# Patient Record
Sex: FEMALE | Race: WHITE | Employment: OTHER
[De-identification: names, ages, dates, MRNs, and addresses within clinical notes are randomized per-mention and may not be internally consistent; named-entity substitution may affect disease eponyms.]

---

## 2017-01-05 ENCOUNTER — PRIOR ORIGINAL RECORDS (OUTPATIENT)
Dept: OTHER | Age: 82
End: 2017-01-05

## 2017-04-07 ENCOUNTER — OFFICE VISIT (OUTPATIENT)
Dept: HEMATOLOGY/ONCOLOGY | Facility: HOSPITAL | Age: 82
End: 2017-04-07
Attending: INTERNAL MEDICINE
Payer: MEDICARE

## 2017-04-07 VITALS
HEART RATE: 76 BPM | HEIGHT: 70 IN | RESPIRATION RATE: 18 BRPM | SYSTOLIC BLOOD PRESSURE: 134 MMHG | DIASTOLIC BLOOD PRESSURE: 71 MMHG | TEMPERATURE: 99 F | OXYGEN SATURATION: 100 %

## 2017-04-07 DIAGNOSIS — N18.9 ANEMIA OF RENAL DISEASE: Primary | ICD-10-CM

## 2017-04-07 DIAGNOSIS — D50.9 IRON DEFICIENCY ANEMIA: ICD-10-CM

## 2017-04-07 DIAGNOSIS — D64.9 NORMOCYTIC NORMOCHROMIC ANEMIA: ICD-10-CM

## 2017-04-07 DIAGNOSIS — D63.1 ANEMIA OF RENAL DISEASE: Primary | ICD-10-CM

## 2017-04-07 PROCEDURE — 99213 OFFICE O/P EST LOW 20 MIN: CPT | Performed by: INTERNAL MEDICINE

## 2017-04-07 RX ORDER — MELATONIN
325
COMMUNITY

## 2017-04-07 NOTE — PROGRESS NOTES
[facility] Progress Note    Patient Name: Keyla Whalen   YOB: 1934   Medical Record Number: [id number]   CSN: [id number]   Attending Physician: Sharlee Lesches, M.D.    Referring Physician: Bebe Rodriguez MD      Date of Visit: 4/7 nightly., Disp: , Rfl:     Allergies:  No Known Allergies     Review of Systems:  A 14-point ROS was done with pertinent positives and negative per the HPI    Vital Signs:  /71 mmHg  Pulse 76  Temp(Src) 98.6 °F (37 °C) (Tympanic)  Resp 18  Ht 1.778 m pending those results will decide if she needs to continue on this. Will recheck labs in 6 months. Emotional Well Being:  I have assessed the patient's emotional well-being and any concerns about anxiety or depression.   We discussed issues of distress

## 2017-04-12 ENCOUNTER — PRIOR ORIGINAL RECORDS (OUTPATIENT)
Dept: OTHER | Age: 82
End: 2017-04-12

## 2017-07-06 ENCOUNTER — PRIOR ORIGINAL RECORDS (OUTPATIENT)
Dept: OTHER | Age: 82
End: 2017-07-06

## 2017-07-14 ENCOUNTER — HOSPITAL ENCOUNTER (OUTPATIENT)
Dept: CV DIAGNOSTICS | Facility: HOSPITAL | Age: 82
Discharge: HOME | End: 2017-07-14
Attending: INTERNAL MEDICINE
Payer: MEDICARE

## 2017-07-14 DIAGNOSIS — I48.0 AF (PAROXYSMAL ATRIAL FIBRILLATION) (HCC): ICD-10-CM

## 2017-07-14 DIAGNOSIS — R53.83 FATIGUE: ICD-10-CM

## 2017-07-14 PROCEDURE — 93017 CV STRESS TEST TRACING ONLY: CPT | Performed by: INTERNAL MEDICINE

## 2017-07-14 PROCEDURE — 78452 HT MUSCLE IMAGE SPECT MULT: CPT | Performed by: INTERNAL MEDICINE

## 2017-07-14 PROCEDURE — 93018 CV STRESS TEST I&R ONLY: CPT | Performed by: INTERNAL MEDICINE

## 2017-08-23 ENCOUNTER — TELEPHONE (OUTPATIENT)
Dept: FAMILY MEDICINE CLINIC | Facility: CLINIC | Age: 82
End: 2017-08-23

## 2017-08-23 NOTE — TELEPHONE ENCOUNTER
Carolynn roque/Dr. Kiara Luna office called our office inquiring about immunizations for the pt. None in Epic. I will request pt's paper chart from [facility] and then have a Nurse review for immunizations.   Our office will call Carolynn roque/Dr. Kiara Luna back at ph @ [phone]

## 2017-08-30 NOTE — TELEPHONE ENCOUNTER
Please call back Dr. Dasha Cedillo office and let them know that we have reviewed all of our records dating back to [address] [place] [address] and I do not see any immunization records.

## 2017-09-06 ENCOUNTER — HOSPITAL ENCOUNTER (OUTPATIENT)
Dept: PHYSICAL THERAPY | Facility: HOSPITAL | Age: 82
Setting detail: THERAPIES SERIES
Discharge: HOME | End: 2017-09-06
Attending: FAMILY MEDICINE
Payer: MEDICARE

## 2017-09-06 PROCEDURE — 97162 PT EVAL MOD COMPLEX 30 MIN: CPT

## 2017-09-06 PROCEDURE — 97110 THERAPEUTIC EXERCISES: CPT

## 2017-09-08 NOTE — PROGRESS NOTES
LOWER EXTREMITY EVALUATION:   Referring Physician: Dr. 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 Pt demonstrates gait and balance deficits. Kaykay Donald would benefit from skilled Physical Therapy to address the above impairments to facilitate decreased pain and improved functional mobility.     Precautions:  None  OBJE demonstrate improved SLS to >5 seconds OWEN to promote safety and decrease risk of falls on uneven surfaces such as grass (8 visits)  · Pt will perform TUG in <12 seconds, demonstrating improved gait speed for improved participation in ADL such as community

## 2017-09-11 ENCOUNTER — HOSPITAL ENCOUNTER (OUTPATIENT)
Dept: PHYSICAL THERAPY | Facility: HOSPITAL | Age: 82
Setting detail: THERAPIES SERIES
Discharge: HOME | End: 2017-09-11
Attending: FAMILY MEDICINE
Payer: MEDICARE

## 2017-09-11 PROCEDURE — 97110 THERAPEUTIC EXERCISES: CPT

## 2017-09-11 PROCEDURE — 97140 MANUAL THERAPY 1/> REGIONS: CPT

## 2017-09-11 NOTE — PROGRESS NOTES
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      Objective: See set         R rotational mobilization, Gr II 1 x 10  Open book x 10         R ITB/lateral retinaculum STM with foam rolling         R clams 3 x 10, 2 sec hold with back stabilized to avoid rotation         Sit-to-stand x 10 (elevated surface)         --

## 2017-09-13 ENCOUNTER — HOSPITAL ENCOUNTER (OUTPATIENT)
Dept: PHYSICAL THERAPY | Facility: HOSPITAL | Age: 82
Setting detail: THERAPIES SERIES
Discharge: HOME | End: 2017-09-13
Attending: FAMILY MEDICINE
Payer: MEDICARE

## 2017-09-13 PROCEDURE — 97110 THERAPEUTIC EXERCISES: CPT

## 2017-09-13 PROCEDURE — 97140 MANUAL THERAPY 1/> REGIONS: CPT

## 2017-09-13 NOTE — PROGRESS NOTES
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

## 2017-09-15 ENCOUNTER — APPOINTMENT (OUTPATIENT)
Dept: PHYSICAL THERAPY | Facility: HOSPITAL | Age: 82
End: 2017-09-15
Attending: FAMILY MEDICINE
Payer: MEDICARE

## 2017-09-18 ENCOUNTER — HOSPITAL ENCOUNTER (OUTPATIENT)
Dept: PHYSICAL THERAPY | Facility: HOSPITAL | Age: 82
Setting detail: THERAPIES SERIES
Discharge: HOME | End: 2017-09-18
Attending: FAMILY MEDICINE
Payer: MEDICARE

## 2017-09-18 PROCEDURE — 97112 NEUROMUSCULAR REEDUCATION: CPT

## 2017-09-18 PROCEDURE — 97110 THERAPEUTIC EXERCISES: CPT

## 2017-09-18 NOTE — PROGRESS NOTES
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

## 2017-09-20 ENCOUNTER — HOSPITAL ENCOUNTER (OUTPATIENT)
Dept: PHYSICAL THERAPY | Facility: HOSPITAL | Age: 82
Setting detail: THERAPIES SERIES
Discharge: HOME | End: 2017-09-20
Attending: FAMILY MEDICINE
Payer: MEDICARE

## 2017-09-20 PROCEDURE — 97110 THERAPEUTIC EXERCISES: CPT

## 2017-09-20 PROCEDURE — 97112 NEUROMUSCULAR REEDUCATION: CPT

## 2017-09-20 NOTE — PROGRESS NOTES
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 5' L5 5' L5 5' 5'      SB rolls DKTC   SB rolls LTR X 15  X 15 Sciatic nerve glides x 20 L/R X 20 L/R      SB bridges x 12 X 12 X 15 X 15      L hip long axis traction 10 x 10 sec -- STM (B) quads/ITB/lateral retinaculum (B)      L hip FADDIR stretch -- Cl

## 2017-09-25 ENCOUNTER — HOSPITAL ENCOUNTER (OUTPATIENT)
Dept: PHYSICAL THERAPY | Facility: HOSPITAL | Age: 82
Setting detail: THERAPIES SERIES
Discharge: HOME | End: 2017-09-25
Attending: FAMILY MEDICINE
Payer: MEDICARE

## 2017-09-25 PROCEDURE — 97112 NEUROMUSCULAR REEDUCATION: CPT

## 2017-09-25 PROCEDURE — 97110 THERAPEUTIC EXERCISES: CPT

## 2017-09-25 NOTE — PROGRESS NOTES
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

## 2017-09-27 ENCOUNTER — HOSPITAL ENCOUNTER (OUTPATIENT)
Dept: PHYSICAL THERAPY | Facility: HOSPITAL | Age: 82
Setting detail: THERAPIES SERIES
Discharge: HOME | End: 2017-09-27
Attending: FAMILY MEDICINE
Payer: MEDICARE

## 2017-09-27 PROCEDURE — 97112 NEUROMUSCULAR REEDUCATION: CPT

## 2017-09-27 PROCEDURE — 97110 THERAPEUTIC EXERCISES: CPT

## 2017-09-27 NOTE — PROGRESS NOTES
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

## 2017-10-02 ENCOUNTER — HOSPITAL ENCOUNTER (OUTPATIENT)
Dept: PHYSICAL THERAPY | Facility: HOSPITAL | Age: 82
Setting detail: THERAPIES SERIES
Discharge: HOME | End: 2017-10-02
Attending: FAMILY MEDICINE
Payer: MEDICARE

## 2017-10-02 PROCEDURE — 97110 THERAPEUTIC EXERCISES: CPT

## 2017-10-02 PROCEDURE — 97140 MANUAL THERAPY 1/> REGIONS: CPT

## 2017-10-02 NOTE — PROGRESS NOTES
Discharge Physical Therapy Summary    Pt has attended 8 visits in Physical Therapy. Subjective: Meseret Qiu states that she no longer notices pain at R PSIS but otherwise does not see much change in her symptoms.  She reports L groin pain when getting in/o toe ext: R 5/5; L 5/5     Special tests:   5STS: 20 sec  TU sec, uses hands for push-off    Balance: SLS: R 5 sec; L 5 sec    Goals:   · Pt will have improved L hip AROM Flex to 100 deg and IR to 15 deg to be able to don/doff shoes and perform car tra TX#: 3/8   Date: 9/18/17              TX#: 4/8 Date:  9/20/17             TX#: 5/8 Date: 9/25/17              TX#: 6/8 Date:  9/27/17             TX#: 7/8 Date: 10/2/17               TX#: 8/8   NU step L4 5' L5 5' L5 5' 5' 6' 6' 5'   SB rolls DKTC   SB ro Tandem stance --   -- -- -- -- -- -- --   Skilled Services: pt education; manual therapy; form correction    Charges: ex x 30', man x 1 15'   Total Timed Treatment: 45 min  Total Treatment Time: 50 min

## 2017-10-04 ENCOUNTER — APPOINTMENT (OUTPATIENT)
Dept: PHYSICAL THERAPY | Facility: HOSPITAL | Age: 82
End: 2017-10-04
Attending: FAMILY MEDICINE
Payer: MEDICARE

## 2017-10-05 ENCOUNTER — OFFICE VISIT (OUTPATIENT)
Dept: SURGERY | Facility: CLINIC | Age: 82
End: 2017-10-05

## 2017-10-05 VITALS
DIASTOLIC BLOOD PRESSURE: 82 MMHG | SYSTOLIC BLOOD PRESSURE: 155 MMHG | TEMPERATURE: 98 F | BODY MASS INDEX: 32.9 KG/M2 | HEIGHT: 71 IN | HEART RATE: 70 BPM | WEIGHT: 235 LBS

## 2017-10-05 DIAGNOSIS — R15.9 ANAL SPHINCTER INCONTINENCE: Primary | ICD-10-CM

## 2017-10-05 DIAGNOSIS — L29.0 PRURITUS ANI: ICD-10-CM

## 2017-10-05 DIAGNOSIS — K64.2 PROLAPSED INTERNAL HEMORRHOIDS, GRADE 3: ICD-10-CM

## 2017-10-05 PROCEDURE — 46600 DIAGNOSTIC ANOSCOPY SPX: CPT | Performed by: COLON & RECTAL SURGERY

## 2017-10-05 PROCEDURE — 99205 OFFICE O/P NEW HI 60 MIN: CPT | Performed by: COLON & RECTAL SURGERY

## 2017-10-05 NOTE — H&P
New Patient Visit Note       Active Problems      1. Anal sphincter incontinence    2. Prolapsed internal hemorrhoids, grade 3    3.  Pruritus ani        Chief Complaint   Patient presents with:  Anal Problem (GI): New pt anal leakage      History of Presen cancer. The patient does take a dose of 81 mg aspirin daily. My total face time with this patient was 60 minutes. Greater than half of our visit was spent in counseling the patient on the above listed diagnoses and treatment options.         The pa Coumadin? no   Do you take any blood thinners? no   Do you need antibiotics prior to dental procedures? yes     Have you had any of the following tests?  (If yes, please provide the approximate date.)  Test Yes/No Date   Flexible Sigmoidoscopy yes 2007 Ba Spouse name:                       Years of education:                 Number of children:               Social History Main Topics    Smoking status: Never Smoker                                                                Smokeless tobacco: Never Use bleeding, blood in stool, constipation, diarrhea, nausea and vomiting. Genitourinary: Negative for difficulty urinating, dysuria, frequency and urgency. Musculoskeletal: Negative for arthralgias and myalgias. Skin: Negative for color change and rash. them without putting her hips into the procedure. She demonstrated this back to me without complication. Nursing note and vitals reviewed.           Assessment   Anal sphincter incontinence  (primary encounter diagnosis)  Prolapsed internal hemorrhoids ovarian and cervical cancer. The patient does take a dose of 81 mg aspirin daily. Clinical examination including anoscopy reveals her to have 1/4 basal tone, 1/4 maximum squeeze pressure.   Despite this, the anal sphincter appears to be circumferentia

## 2017-10-06 ENCOUNTER — APPOINTMENT (OUTPATIENT)
Dept: HEMATOLOGY/ONCOLOGY | Facility: HOSPITAL | Age: 82
End: 2017-10-06
Attending: INTERNAL MEDICINE
Payer: MEDICARE

## 2017-10-06 ENCOUNTER — TELEPHONE (OUTPATIENT)
Dept: SURGERY | Facility: CLINIC | Age: 82
End: 2017-10-06

## 2017-10-08 PROBLEM — K64.2 PROLAPSED INTERNAL HEMORRHOIDS, GRADE 3: Status: ACTIVE | Noted: 2017-10-08

## 2017-10-08 PROBLEM — L29.0 PRURITUS ANI: Status: ACTIVE | Noted: 2017-10-08

## 2017-10-08 PROBLEM — R15.9 ANAL SPHINCTER INCONTINENCE: Status: ACTIVE | Noted: 2017-10-08

## 2017-10-09 NOTE — PATIENT INSTRUCTIONS
I am seeing this patient in consultation from the primary care service as well as the gastroenterology group from [facility]. This patient complains of severe anal seepage. She states that she has had 2 years of symptoms.     She has no episiotomy. The patient has a positive anal wink. There are 3 very large grade 3 prolapsing internal hemorrhoids. There is no evidence of proctitis or Crohn's disease. There is no evidence of fissure or fistula.   There are no palpable abnormalities o

## 2017-10-09 NOTE — PROCEDURES
Procedure:  Anoscopy    Surgeon: Vicente Yun    Anesthesia: None    Findings: See the progress note attached for all findings    Operative Summary: The patient was placed in a prone position on the proctoscopy table, the hips were flexed in the jackknife p

## 2017-10-13 ENCOUNTER — APPOINTMENT (OUTPATIENT)
Dept: HEMATOLOGY/ONCOLOGY | Facility: HOSPITAL | Age: 82
End: 2017-10-13
Attending: INTERNAL MEDICINE
Payer: MEDICARE

## 2017-10-20 ENCOUNTER — HOSPITAL ENCOUNTER (OUTPATIENT)
Dept: MAMMOGRAPHY | Age: 82
Discharge: HOME | End: 2017-10-20
Attending: FAMILY MEDICINE
Payer: MEDICARE

## 2017-10-20 ENCOUNTER — HOSPITAL ENCOUNTER (OUTPATIENT)
Dept: BONE DENSITY | Age: 82
Discharge: HOME | End: 2017-10-20
Attending: FAMILY MEDICINE
Payer: MEDICARE

## 2017-10-20 DIAGNOSIS — Z12.31 ENCOUNTER FOR SCREENING MAMMOGRAM FOR MALIGNANT NEOPLASM OF BREAST: ICD-10-CM

## 2017-10-20 DIAGNOSIS — M85.9 DISORDER OF BONE DENSITY AND STRUCTURE, UNSPECIFIED: ICD-10-CM

## 2017-10-20 PROCEDURE — 77080 DXA BONE DENSITY AXIAL: CPT | Performed by: FAMILY MEDICINE

## 2017-10-20 PROCEDURE — 77067 SCR MAMMO BI INCL CAD: CPT | Performed by: FAMILY MEDICINE

## 2017-11-02 ENCOUNTER — OFFICE VISIT (OUTPATIENT)
Dept: SURGERY | Facility: CLINIC | Age: 82
End: 2017-11-02

## 2017-11-02 VITALS
SYSTOLIC BLOOD PRESSURE: 129 MMHG | HEART RATE: 73 BPM | RESPIRATION RATE: 16 BRPM | DIASTOLIC BLOOD PRESSURE: 78 MMHG | TEMPERATURE: 99 F | HEIGHT: 71 IN

## 2017-11-02 DIAGNOSIS — K64.2 PROLAPSED INTERNAL HEMORRHOIDS, GRADE 3: Primary | ICD-10-CM

## 2017-11-02 DIAGNOSIS — D12.6 ADENOMATOUS POLYP OF COLON, UNSPECIFIED PART OF COLON: ICD-10-CM

## 2017-11-02 DIAGNOSIS — L29.0 PRURITUS ANI: ICD-10-CM

## 2017-11-02 PROBLEM — K63.5 COLON POLYPS: Status: ACTIVE | Noted: 2017-11-02

## 2017-11-02 PROCEDURE — 46221 LIGATION OF HEMORRHOID(S): CPT | Performed by: COLON & RECTAL SURGERY

## 2017-11-02 NOTE — PROCEDURES
Pre op diagnosis: Internal Hemorrhoids    Post op diagnosis: Same    Procedure: Anoscopy with O-ring Rubber Band Ligation of Internal Hemorrhoids    Surgeon: Robin Haro MD    History of present illness:    This patient has internal hemorrhoids that are s

## 2017-11-02 NOTE — PATIENT INSTRUCTIONS
At today's office visit we treated a right anterolateral internal hemorrhoid. At today's visit, the patient underwent an uncomplicated application of a rubber band and injection of a 5% phenol solution into the base of the rubberbanded hemorrhoid.     Dalton Brown

## 2017-11-02 NOTE — PROGRESS NOTES
Follow Up Visit Note       Active Problems      1. Prolapsed internal hemorrhoids, grade 3    2. Pruritus ani    3.  Adenomatous polyp of colon, unspecified part of colon          Chief Complaint   Patient presents with:  Hemorrhoids: 1st hemorrhoid banding Years of education:                 Number of children:               Social History Main Topics    Smoking status: Never Smoker                                                                Smokeless tobacco: Never Used                        Alcohol diarrhea, nausea and vomiting. Genitourinary: Negative for difficulty urinating, dysuria, frequency and urgency. Musculoskeletal: Negative for arthralgias and myalgias. Skin: Negative for color change and rash.    Neurological: Negative for tremors, s

## 2017-11-03 ENCOUNTER — TELEPHONE (OUTPATIENT)
Dept: SURGERY | Facility: CLINIC | Age: 82
End: 2017-11-03

## 2017-11-03 NOTE — TELEPHONE ENCOUNTER
Pt had 1st hemorrhoid banding last night and states it looked like a lot of blood with her BM this am. No more bleeding since then. Informed pt that some bleeding is normal especially with BM, but to call us if it increases or does not stop.  Pt verbalized

## 2017-11-13 ENCOUNTER — PRIOR ORIGINAL RECORDS (OUTPATIENT)
Dept: OTHER | Age: 82
End: 2017-11-13

## 2017-11-16 ENCOUNTER — OFFICE VISIT (OUTPATIENT)
Dept: SURGERY | Facility: CLINIC | Age: 82
End: 2017-11-16

## 2017-11-16 VITALS
DIASTOLIC BLOOD PRESSURE: 79 MMHG | HEART RATE: 65 BPM | TEMPERATURE: 97 F | RESPIRATION RATE: 16 BRPM | HEIGHT: 71 IN | SYSTOLIC BLOOD PRESSURE: 167 MMHG

## 2017-11-16 DIAGNOSIS — K64.2 PROLAPSED INTERNAL HEMORRHOIDS, GRADE 3: Primary | ICD-10-CM

## 2017-11-16 DIAGNOSIS — L29.0 PRURITUS ANI: ICD-10-CM

## 2017-11-16 PROCEDURE — 46221 LIGATION OF HEMORRHOID(S): CPT | Performed by: COLON & RECTAL SURGERY

## 2017-11-16 NOTE — PROGRESS NOTES
Follow Up Visit Note       Active Problems      1. Prolapsed internal hemorrhoids, grade 3    2. Pruritus ani          Chief Complaint   Patient presents with:  Hemorrhoid Bandinnd hemorrhoid banding treatment.          History of Present Illness Number of children:               Social History Main Topics    Smoking status: Never Smoker                                                                Smokeless tobacco: Never Used                        Alcohol use: No              Drug use:  No Genitourinary: Negative for difficulty urinating, dysuria, frequency and urgency. Musculoskeletal: Negative for arthralgias and myalgias. Skin: Negative for color change and rash. Neurological: Negative for tremors, syncope and weakness.    Hematolo

## 2017-11-16 NOTE — PROCEDURES
Pre op diagnosis: Internal Hemorrhoids    Post op diagnosis: Same    Procedure: Anoscopy with O-ring Rubber Band Ligation of Internal Hemorrhoids    Surgeon: Tamara Kaplan MD    History of present illness:    This patient has internal hemorrhoids that are s

## 2017-11-21 ENCOUNTER — APPOINTMENT (OUTPATIENT)
Dept: CV DIAGNOSTICS | Facility: HOSPITAL | Age: 82
DRG: 310 | End: 2017-11-21
Attending: INTERNAL MEDICINE
Payer: MEDICARE

## 2017-11-21 ENCOUNTER — APPOINTMENT (OUTPATIENT)
Dept: GENERAL RADIOLOGY | Facility: HOSPITAL | Age: 82
DRG: 310 | End: 2017-11-21
Attending: EMERGENCY MEDICINE
Payer: MEDICARE

## 2017-11-21 ENCOUNTER — HOSPITAL ENCOUNTER (INPATIENT)
Facility: HOSPITAL | Age: 82
LOS: 1 days | Discharge: HOME | DRG: 310 | End: 2017-11-22
Attending: EMERGENCY MEDICINE | Admitting: FAMILY MEDICINE
Payer: MEDICARE

## 2017-11-21 DIAGNOSIS — I48.91 ATRIAL FIBRILLATION WITH RAPID VENTRICULAR RESPONSE (HCC): Primary | ICD-10-CM

## 2017-11-21 DIAGNOSIS — N28.9 RENAL INSUFFICIENCY: ICD-10-CM

## 2017-11-21 PROBLEM — N18.30 CHRONIC RENAL DISEASE, STAGE III (HCC): Status: ACTIVE | Noted: 2017-11-21

## 2017-11-21 PROBLEM — Z79.899 ENCOUNTER FOR LONG-TERM (CURRENT) USE OF MEDICATIONS: Chronic | Status: ACTIVE | Noted: 2017-11-21

## 2017-11-21 PROCEDURE — 93010 ELECTROCARDIOGRAM REPORT: CPT

## 2017-11-21 PROCEDURE — 93005 ELECTROCARDIOGRAM TRACING: CPT

## 2017-11-21 PROCEDURE — 84484 ASSAY OF TROPONIN QUANT: CPT | Performed by: EMERGENCY MEDICINE

## 2017-11-21 PROCEDURE — 5A2204Z RESTORATION OF CARDIAC RHYTHM, SINGLE: ICD-10-PCS | Performed by: INTERNAL MEDICINE

## 2017-11-21 PROCEDURE — 85730 THROMBOPLASTIN TIME PARTIAL: CPT | Performed by: EMERGENCY MEDICINE

## 2017-11-21 PROCEDURE — 96366 THER/PROPH/DIAG IV INF ADDON: CPT

## 2017-11-21 PROCEDURE — 71010 XR CHEST AP PORTABLE  (CPT=71010): CPT | Performed by: EMERGENCY MEDICINE

## 2017-11-21 PROCEDURE — 92960 CARDIOVERSION ELECTRIC EXT: CPT

## 2017-11-21 PROCEDURE — 99285 EMERGENCY DEPT VISIT HI MDM: CPT

## 2017-11-21 PROCEDURE — 93306 TTE W/DOPPLER COMPLETE: CPT | Performed by: INTERNAL MEDICINE

## 2017-11-21 PROCEDURE — 93010 ELECTROCARDIOGRAM REPORT: CPT | Performed by: INTERNAL MEDICINE

## 2017-11-21 PROCEDURE — 85025 COMPLETE CBC W/AUTO DIFF WBC: CPT | Performed by: EMERGENCY MEDICINE

## 2017-11-21 PROCEDURE — 84443 ASSAY THYROID STIM HORMONE: CPT | Performed by: EMERGENCY MEDICINE

## 2017-11-21 PROCEDURE — 83735 ASSAY OF MAGNESIUM: CPT | Performed by: EMERGENCY MEDICINE

## 2017-11-21 PROCEDURE — 96368 THER/DIAG CONCURRENT INF: CPT

## 2017-11-21 PROCEDURE — 96365 THER/PROPH/DIAG IV INF INIT: CPT

## 2017-11-21 PROCEDURE — 80053 COMPREHEN METABOLIC PANEL: CPT | Performed by: EMERGENCY MEDICINE

## 2017-11-21 PROCEDURE — 85730 THROMBOPLASTIN TIME PARTIAL: CPT | Performed by: FAMILY MEDICINE

## 2017-11-21 RX ORDER — ONDANSETRON 2 MG/ML
4 INJECTION INTRAMUSCULAR; INTRAVENOUS EVERY 6 HOURS PRN
Status: DISCONTINUED | OUTPATIENT
Start: 2017-11-21 | End: 2017-11-21

## 2017-11-21 RX ORDER — HEPARIN SODIUM 5000 [USP'U]/ML
5000 INJECTION INTRAVENOUS; SUBCUTANEOUS ONCE
Status: COMPLETED | OUTPATIENT
Start: 2017-11-21 | End: 2017-11-21

## 2017-11-21 RX ORDER — DILTIAZEM HYDROCHLORIDE 5 MG/ML
20 INJECTION INTRAVENOUS ONCE
Status: COMPLETED | OUTPATIENT
Start: 2017-11-21 | End: 2017-11-21

## 2017-11-21 RX ORDER — DILTIAZEM HCL-SODIUM CHLORIDE IV SOLN 125 MG/125ML-0.9% 125-0.9/125 MG/ML-%
10 SOLUTION INTRAVENOUS CONTINUOUS
Status: DISCONTINUED | OUTPATIENT
Start: 2017-11-21 | End: 2017-11-22

## 2017-11-21 RX ORDER — HEPARIN SODIUM AND DEXTROSE 10000; 5 [USP'U]/100ML; G/100ML
INJECTION INTRAVENOUS CONTINUOUS
Status: DISCONTINUED | OUTPATIENT
Start: 2017-11-21 | End: 2017-11-22

## 2017-11-21 RX ORDER — SODIUM CHLORIDE 9 MG/ML
1000 INJECTION, SOLUTION INTRAVENOUS CONTINUOUS
Status: DISCONTINUED | OUTPATIENT
Start: 2017-11-21 | End: 2017-11-21

## 2017-11-21 RX ORDER — ACETAMINOPHEN 325 MG/1
325 TABLET ORAL EVERY 4 HOURS PRN
Status: DISCONTINUED | OUTPATIENT
Start: 2017-11-21 | End: 2017-11-22

## 2017-11-21 RX ORDER — SODIUM CHLORIDE 9 MG/ML
INJECTION, SOLUTION INTRAVENOUS CONTINUOUS
Status: DISCONTINUED | OUTPATIENT
Start: 2017-11-21 | End: 2017-11-22

## 2017-11-21 RX ORDER — ACETAMINOPHEN 325 MG/1
650 TABLET ORAL EVERY 4 HOURS PRN
Status: DISCONTINUED | OUTPATIENT
Start: 2017-11-21 | End: 2017-11-22

## 2017-11-21 RX ORDER — METOPROLOL SUCCINATE 25 MG/1
25 TABLET, EXTENDED RELEASE ORAL DAILY
Status: DISCONTINUED | OUTPATIENT
Start: 2017-11-21 | End: 2017-11-22

## 2017-11-21 RX ORDER — DILTIAZEM HCL-SODIUM CHLORIDE IV SOLN 125 MG/125ML-0.9% 125-0.9/125 MG/ML-%
5 SOLUTION INTRAVENOUS CONTINUOUS
Status: DISCONTINUED | OUTPATIENT
Start: 2017-11-21 | End: 2017-11-21

## 2017-11-21 RX ORDER — TORSEMIDE 20 MG/1
40 TABLET ORAL EVERY OTHER DAY
Status: ON HOLD | COMMUNITY
End: 2018-06-15

## 2017-11-21 RX ORDER — HEPARIN SODIUM AND DEXTROSE 10000; 5 [USP'U]/100ML; G/100ML
1000 INJECTION INTRAVENOUS ONCE
Status: COMPLETED | OUTPATIENT
Start: 2017-11-21 | End: 2017-11-22

## 2017-11-21 RX ORDER — DILTIAZEM HYDROCHLORIDE 5 MG/ML
10 INJECTION INTRAVENOUS EVERY 6 HOURS PRN
Status: DISCONTINUED | OUTPATIENT
Start: 2017-11-21 | End: 2017-11-22

## 2017-11-21 NOTE — PLAN OF CARE
Problem: CARDIOVASCULAR - ADULT  Goal: Maintains optimal cardiac output and hemodynamic stability  INTERVENTIONS:  - Monitor vital signs, rhythm, and trends  - Monitor for bleeding, hypotension and signs of decreased cardiac output  - Evaluate effectivenes 11/21/17 1249   Vital Signs   Temp 98 °F (36.7 °C) --  --    Temp src Oral --  --    Pulse 97 115 132   Heart Rate Source Monitor Monitor Monitor   Resp 20 18 22   Respiratory Quality Normal Normal Normal   BP 96/61 98/62 (!) 77/51   BP Location Left arm L

## 2017-11-21 NOTE — ED NOTES
Pt going to room , reported to Marta MCDONALD. Pt aware of her admission and verbalizing understanding.

## 2017-11-21 NOTE — HISTORICAL OFFICE NOTE
Dave Rollins  : 1934  ACCOUNT:  [id number]  [phone]  PCP: Dr. Olena Lobo     TODAY'S DATE: 2017  DICTATED BY:  Bebo Simons M.D.  ]    CHIEF COMPLAINT: [Followup of Hyperlipidemia, mixed.]    HPI:    [On 2017, Kennth Cap and moist. NECK: jugular venous pressure not elevated. RESP: respirations with normal rate and rhythm, clear to auscultation. GI: no masses, tenderness or hepatosplenomegaly, rectal deferred. MS: adequate gait for exercise/testing.  EXT: no clubbing or cyan Congesti5-325MG   1 tablet by mouth as needed              01/05/17 Voltaren              1%        75mg tablet by mouth daily               01/05/17 Zantac                150MG     1 tablet by mouth as needed              03/25/10 Calcium               60

## 2017-11-21 NOTE — PROGRESS NOTES
RECEIVED FROM ER,ALERT AND OX3,NO SOB ROOM AIR. TELE AFIB WITH HR-140'S-160'S. BP-94/56. DENIES ANY PALPITATION. NOTIFIED EVE CUEVAS FOR DR. CORREA WITH ORDERS. INCREASE CARDIZEM DRIP TO 10 CC/HR AS ORDER.

## 2017-11-21 NOTE — CONSULTS
[facility]  Report of Consultation    Hank Dominguez Patient Status:  Inpatient    1934 MRN [id number]   [facility] 2NE-A Attending Con Antonio MD   Hosp Day # 0 PCP Linda Guerrero MD Last saw Dr. Kevin Renee in July and was in sinus rhythm at that time (by EKG). Last nuclear stress test was also in July which was neg for ischemia with a normal EF.     History:  Past Medical History:   Diagnosis Date   • Anal spasm    • Arrhythmia     atria acetaminophen (TYLENOL) tab 325 mg, 325 mg, Oral, Q4H PRN **OR** acetaminophen (TYLENOL) tab 650 mg, 650 mg, Oral, Q4H PRN  •  diltiazem 125 mg/125 ml in NaCl (CARDIZEM) 1 mg/ml premix, 10 mg/hr, Intravenous, Continuous  •  Metoprolol Succinate ER (Toprol 11/15/2013 0.76   11/14/2013 1.16 (H)   09/29/2011 0.98   ----------  Creatinine (mg/dL)   Date Value   11/21/2017 1.34 (H)   07/08/2016 1.34 (H)   05/18/2016 1.27 (H)   ----------    Lab Results  Component Value Date   PT 13.5 11/04/2013   INR 1.07 11/0

## 2017-11-21 NOTE — ED PROVIDER NOTES
Patient Seen in: [facility] Emergency Department    History   Patient presents with:  Arrythmia/Palpitations (cardiovascular)    Stated Complaint: palpitations    HPI    Patient is a 12-year-old female presents to emergency room for evaluation palpi stated complaint: palpitations  Other systems are as noted in HPI. Constitutional and vital signs reviewed. All other systems reviewed and negative except as noted above.     Physical Exam   ED Triage Vitals [11/21/17 0558]  BP: (!) 155/108  Pulse: 16 Narrative: The aPTT Heparin Therapeutic Range is approximately 65- 104 seconds. The therapeutic range has been validated against 0.3-0.7 heparin anti-Xa units/mL. CBC WITH DIFFERENTIAL WITH PLATELET    Narrative:      The following orders were creat Critical care time was 35 minutes.  This critical care time is exclusive of procedures critical care time includes monitoring of patient's cardiopulmonary and hemodynamic status, interpretation of laboratory values, and discussion of case with physician and

## 2017-11-21 NOTE — PROCEDURES
PROCEDURE(S) PERFORMED:    1. Cardioversion. 2.     Sedation     :  Micah Bird MD     ANESTHESIA:  IV sedation. INDICATION:  81 y/o female presented with abrupt onset of palpitations. Presented within 1 hour and found to be in rapid afib.

## 2017-11-21 NOTE — H&P
[address] [facility] Patient Status:  Emergency    1934 MRN [id number]   Location [address] [facility] Attending Jovi Rios MD   Hosp Day # 0 PCP Patti Madison MD     Date:  20 Stripping  No date: OTHER SURGICAL HISTORY      Comment: Venous Sclerosing By Injection  No date: TONSILLECTOMY  No date: [facility] - [facility]  Family History   Problem Relation Age of Onset   • Other [OTHER] Father      anemia   • Cancer Maternal Grandfather auscultation bilaterally, respirations unlabored   Chest Wall:    No tenderness or deformity    Heart:    Irregular rhythm, Rate currently 120s, S1 and S2 normal, no murmur, rub   or gallop;  Telemetry with AF with RVR   Breast Exam:    Deferred   Abdomen: typical/normal baseline. Plan:  Awaiting cardiology consultation. ? DCCV today. May need long term anticoagulation - I counseled the patient on the various options. Hold Torsemide and Metoprolol until seen by Cardiology.       Admission Day Care was 75

## 2017-11-22 ENCOUNTER — MED REC SCAN ONLY (OUTPATIENT)
Dept: FAMILY MEDICINE CLINIC | Facility: CLINIC | Age: 82
End: 2017-11-22

## 2017-11-22 ENCOUNTER — PRIOR ORIGINAL RECORDS (OUTPATIENT)
Dept: OTHER | Age: 82
End: 2017-11-22

## 2017-11-22 VITALS
HEIGHT: 71 IN | WEIGHT: 228.63 LBS | BODY MASS INDEX: 32.01 KG/M2 | RESPIRATION RATE: 20 BRPM | SYSTOLIC BLOOD PRESSURE: 143 MMHG | TEMPERATURE: 98 F | DIASTOLIC BLOOD PRESSURE: 94 MMHG | OXYGEN SATURATION: 94 % | HEART RATE: 71 BPM

## 2017-11-22 PROCEDURE — 80048 BASIC METABOLIC PNL TOTAL CA: CPT | Performed by: FAMILY MEDICINE

## 2017-11-22 PROCEDURE — 85610 PROTHROMBIN TIME: CPT | Performed by: FAMILY MEDICINE

## 2017-11-22 PROCEDURE — 85730 THROMBOPLASTIN TIME PARTIAL: CPT | Performed by: FAMILY MEDICINE

## 2017-11-22 PROCEDURE — 83735 ASSAY OF MAGNESIUM: CPT | Performed by: FAMILY MEDICINE

## 2017-11-22 RX ORDER — ENOXAPARIN SODIUM 100 MG/ML
1 INJECTION SUBCUTANEOUS 2 TIMES DAILY
Status: DISCONTINUED | OUTPATIENT
Start: 2017-11-22 | End: 2017-11-22

## 2017-11-22 RX ORDER — ENOXAPARIN SODIUM 100 MG/ML
1 INJECTION SUBCUTANEOUS EVERY 12 HOURS SCHEDULED
Qty: 6 SYRINGE | Refills: 1 | Status: ON HOLD | OUTPATIENT
Start: 2017-11-22 | End: 2018-06-13

## 2017-11-22 RX ORDER — WARFARIN SODIUM 5 MG/1
5 TABLET ORAL NIGHTLY
Qty: 30 TABLET | Refills: 4 | Status: SHIPPED | OUTPATIENT
Start: 2017-11-22 | End: 2018-05-17

## 2017-11-22 RX ORDER — ENOXAPARIN SODIUM 100 MG/ML
1 INJECTION SUBCUTANEOUS EVERY 12 HOURS SCHEDULED
Qty: 6 SYRINGE | Refills: 1 | Status: SHIPPED | OUTPATIENT
Start: 2017-11-22 | End: 2017-11-22

## 2017-11-22 RX ORDER — OMEPRAZOLE 20 MG/1
20 CAPSULE, DELAYED RELEASE ORAL
Qty: 60 CAPSULE | Refills: 5 | Status: SHIPPED | OUTPATIENT
Start: 2017-11-22 | End: 2017-11-22

## 2017-11-22 RX ORDER — WARFARIN SODIUM 5 MG/1
5 TABLET ORAL NIGHTLY
Status: DISCONTINUED | OUTPATIENT
Start: 2017-11-22 | End: 2017-11-22

## 2017-11-22 RX ORDER — POTASSIUM CHLORIDE 20 MEQ/1
40 TABLET, EXTENDED RELEASE ORAL ONCE
Status: COMPLETED | OUTPATIENT
Start: 2017-11-22 | End: 2017-11-22

## 2017-11-22 RX ORDER — AMIODARONE HYDROCHLORIDE 200 MG/1
200 TABLET ORAL DAILY
Qty: 30 TABLET | Refills: 5 | Status: SHIPPED | OUTPATIENT
Start: 2017-11-23 | End: 2020-12-01

## 2017-11-22 RX ORDER — AMIODARONE HYDROCHLORIDE 200 MG/1
200 TABLET ORAL DAILY
Status: DISCONTINUED | OUTPATIENT
Start: 2017-11-23 | End: 2017-11-22

## 2017-11-22 NOTE — PROGRESS NOTES
[address] [facility] to check cost of Eliquis and Multaq.  Multaq is $468.63/month and Eliquis will be $77.64/month    Pt notified of cost

## 2017-11-22 NOTE — PLAN OF CARE
Discussed with Dr. Diana Smith. D/t cost will substitute Amiodarone for Multaq and start coumadin with lovenox bridging till INR is >2. Enrolled in coumadin clinic. Teaching today prior to discharge.     Lizet Coyne NP   11/22/2017  10:26 AM

## 2017-11-22 NOTE — PROGRESS NOTES
NURSING DISCHARGE NOTE    Discharged Home via Ambulatory. Accompanied by Family member and Support staff  Belongings Taken by patient/family. Pt alert and oriented. IV and tele removed. Discharge instructions and handouts given and discussed.  Medic

## 2017-11-22 NOTE — PLAN OF CARE
CARDIOVASCULAR - ADULT    • Maintains optimal cardiac output and hemodynamic stability Progressing    • Absence of cardiac arrhythmias or at baseline Progressing          Rcv'd A/o/3  Denies pain or discomfort  On tele with SR  Paddle burn yee from cardio

## 2017-11-22 NOTE — PROGRESS NOTES
[facility]  Progress Note    [address] [facility] Patient Status:  Inpatient    1934 MRN [id number]   [facility] 2NE-A Attending Teodoro Price MD   Hosp Day # 1 PCP Hussein Dallas MD       SUBJECTIVE:  No CP, SOB, or N/V.  Fe Oral Q4H PRN   diltiazem 125 mg/125 ml in NaCl (CARDIZEM) 1 mg/ml premix 10 mg/hr Intravenous Continuous   Metoprolol Succinate ER (Toprol XL) 24 hr tab 25 mg 25 mg Oral Daily   DilTIAZem HCl (CARDIZEM) injection 10 mg 10 mg Intravenous Q6H PRN   0.9%  NaC Questions/concerns were discussed with patient and/or family by bedside.     Total Time spent with patient and coordinating care: 45 minutes (Possible Discharge Day Care)      Elizabeth Shaffer MD  11/22/2017  8:11 AM

## 2017-11-22 NOTE — DISCHARGE SUMMARY
[facility]    Discharge Summary    [address] [facility] Patient Status:  Observation    1934 MRN [id number]   [facility] 2NE-A Attending Cabrera Khan MD   Hosp Day # 1 PCP Roseanne Aguilar MD     Date of Admission: 20 Inject 1 mL (100 mg total) into the skin every 12 (twelve) hours. Stop when INR is >2   Quantity:  6 Syringe  Refills:  1     Warfarin Sodium 5 MG Tabs  Commonly known as:  COUMADIN      Take 1 tablet (5 mg total) by mouth nightly.    Quantity:  30 tablet Kiara Luna MD  11/22/2017  10:53 AM

## 2017-11-22 NOTE — PLAN OF CARE
CARDIOVASCULAR - ADULT    • Maintains optimal cardiac output and hemodynamic stability Progressing    • Absence of cardiac arrhythmias or at baseline Progressing    Pt on tele, NSR on monitor. Heparin gtt per protocol. Pt alert and oriented.  On room

## 2017-11-22 NOTE — PROGRESS NOTES
[facility]  Progress Note    [address] [facility] Patient Status:  Inpatient    1934 MRN [id number]   [facility] 2NE-A Attending Fuad Gottlieb MD   Hosp Day # 1 PCP Daniel Romero MD     Assessment:  1.   New onset symptomati 40 mEq Oral Once   • Metoprolol Succinate ER  25 mg Oral Daily   • dronedarone HCl  400 mg Oral BID with meals     • Continuous dose Heparin infusion 1,000 Units/hr (11/22/17 1249)   • diltiazem Stopped (11/21/17 3612)   • sodium chloride 20 mL/hr at 11/21

## 2017-11-24 ENCOUNTER — HOSPITAL ENCOUNTER (EMERGENCY)
Facility: HOSPITAL | Age: 82
Discharge: HOME | End: 2017-11-24
Attending: EMERGENCY MEDICINE
Payer: MEDICARE

## 2017-11-24 ENCOUNTER — PRIOR ORIGINAL RECORDS (OUTPATIENT)
Dept: OTHER | Age: 82
End: 2017-11-24

## 2017-11-24 ENCOUNTER — HOSPITAL ENCOUNTER (OUTPATIENT)
Dept: LAB | Facility: HOSPITAL | Age: 82
Discharge: HOME | End: 2017-11-24
Attending: INTERNAL MEDICINE
Payer: MEDICARE

## 2017-11-24 VITALS
OXYGEN SATURATION: 98 % | HEART RATE: 70 BPM | WEIGHT: 228.63 LBS | RESPIRATION RATE: 19 BRPM | DIASTOLIC BLOOD PRESSURE: 72 MMHG | BODY MASS INDEX: 32 KG/M2 | SYSTOLIC BLOOD PRESSURE: 122 MMHG | TEMPERATURE: 98 F

## 2017-11-24 DIAGNOSIS — I48.91 ATRIAL FIBRILLATION (HCC): ICD-10-CM

## 2017-11-24 DIAGNOSIS — K62.5 RECTAL BLEEDING: Primary | ICD-10-CM

## 2017-11-24 PROCEDURE — 99283 EMERGENCY DEPT VISIT LOW MDM: CPT

## 2017-11-24 PROCEDURE — 85730 THROMBOPLASTIN TIME PARTIAL: CPT | Performed by: EMERGENCY MEDICINE

## 2017-11-24 PROCEDURE — 85610 PROTHROMBIN TIME: CPT

## 2017-11-24 PROCEDURE — 85025 COMPLETE CBC W/AUTO DIFF WBC: CPT | Performed by: EMERGENCY MEDICINE

## 2017-11-24 PROCEDURE — 85610 PROTHROMBIN TIME: CPT | Performed by: EMERGENCY MEDICINE

## 2017-11-24 PROCEDURE — 80053 COMPREHEN METABOLIC PANEL: CPT | Performed by: EMERGENCY MEDICINE

## 2017-11-24 PROCEDURE — 36415 COLL VENOUS BLD VENIPUNCTURE: CPT

## 2017-11-25 NOTE — ED INITIAL ASSESSMENT (HPI)
Patient arrives from home with c/o rectal bleeding, states hx. Of hemorrhoids.  She is also taking coumadin and Lovenox x3 days for new diagnosis a-fib

## 2017-11-25 NOTE — ED PROVIDER NOTES
Patient Seen in: [facility] Emergency Department    History   Patient presents with:  GI Bleeding (gastrointestinal)    Stated Complaint: rectal bleeding on warfarin    HPI    45-year-old female presents with bright red blood per rectum.   She report Smokeless tobacco: Never Used                      Alcohol use: No                Review of Systems    Positive for stated complaint: rectal bleeding on warfarin  Other systems are as noted in HPI.   Constitut following:     HGB 11.5 (*)     RDW-SD 48.3 (*)     All other components within normal limits   PROTHROMBIN TIME (PT) - Normal   CBC WITH DIFFERENTIAL WITH PLATELET    Narrative:      The following orders were created for panel order CBC WITH DIFFERENTIAL W concerns.         Disposition and Plan     Clinical Impression:  Rectal bleeding  (primary encounter diagnosis)    Disposition:  Discharge  11/24/2017 11:49 pm    Follow-up:  Hilda Sims MD  [facility] [address]  [address] [address]  [phone]

## 2017-11-27 ENCOUNTER — HOSPITAL ENCOUNTER (OUTPATIENT)
Dept: LAB | Facility: HOSPITAL | Age: 82
Discharge: HOME | End: 2017-11-27
Attending: INTERNAL MEDICINE
Payer: MEDICARE

## 2017-11-27 DIAGNOSIS — I48.91 ATRIAL FIBRILLATION (HCC): ICD-10-CM

## 2017-11-27 PROCEDURE — 85610 PROTHROMBIN TIME: CPT

## 2017-11-30 ENCOUNTER — HOSPITAL ENCOUNTER (OUTPATIENT)
Dept: LAB | Facility: HOSPITAL | Age: 82
Discharge: HOME | End: 2017-11-30
Attending: INTERNAL MEDICINE
Payer: MEDICARE

## 2017-11-30 DIAGNOSIS — I48.91 ATRIAL FIBRILLATION (HCC): ICD-10-CM

## 2017-11-30 PROCEDURE — 85610 PROTHROMBIN TIME: CPT

## 2017-12-01 ENCOUNTER — PRIOR ORIGINAL RECORDS (OUTPATIENT)
Dept: OTHER | Age: 82
End: 2017-12-01

## 2017-12-01 ENCOUNTER — MYAURORA ACCOUNT LINK (OUTPATIENT)
Dept: OTHER | Age: 82
End: 2017-12-01

## 2017-12-04 ENCOUNTER — PRIOR ORIGINAL RECORDS (OUTPATIENT)
Dept: OTHER | Age: 82
End: 2017-12-04

## 2017-12-04 ENCOUNTER — TELEPHONE (OUTPATIENT)
Dept: SURGERY | Facility: CLINIC | Age: 82
End: 2017-12-04

## 2017-12-04 ENCOUNTER — HOSPITAL ENCOUNTER (OUTPATIENT)
Dept: LAB | Facility: HOSPITAL | Age: 82
Discharge: HOME | End: 2017-12-04
Attending: FAMILY MEDICINE
Payer: MEDICARE

## 2017-12-04 PROCEDURE — 80048 BASIC METABOLIC PNL TOTAL CA: CPT | Performed by: FAMILY MEDICINE

## 2017-12-04 PROCEDURE — 36415 COLL VENOUS BLD VENIPUNCTURE: CPT | Performed by: FAMILY MEDICINE

## 2017-12-04 PROCEDURE — 85610 PROTHROMBIN TIME: CPT | Performed by: FAMILY MEDICINE

## 2017-12-04 PROCEDURE — 85025 COMPLETE CBC W/AUTO DIFF WBC: CPT | Performed by: FAMILY MEDICINE

## 2017-12-04 NOTE — TELEPHONE ENCOUNTER
Pt called. Pt requesting a call back. Pt states 'was in the hospital for A-FIB.  Had rectal bleeding and was told by PCP should call surgeon's office to discuss further.' mp    Pt [phone]

## 2017-12-05 ENCOUNTER — PRIOR ORIGINAL RECORDS (OUTPATIENT)
Dept: OTHER | Age: 82
End: 2017-12-05

## 2017-12-05 LAB
BUN: 19 MG/DL
CALCIUM: 9.8 MG/DL
CHLORIDE: 106 MEQ/L
CREATININE, SERUM: 1.32 MG/DL
GLUCOSE: 95 MG/DL
HEMATOCRIT: 3706 %
HEMOGLOBIN: 11.8 G/DL
PLATELETS: 198 K/UL
POTASSIUM, SERUM: 4.4 MEQ/L
RED BLOOD COUNT: 3.97 X 10-6/U
SODIUM: 143 MEQ/L
WHITE BLOOD COUNT: 4.3 X 10-3/U

## 2017-12-05 NOTE — TELEPHONE ENCOUNTER
Reviewed update patient history with Dr. Gregory Goff, patient will keep her 12/14/2017 1600 appt, but will not have banding. Will meet to evaluate on going treatment of hemorrhoids.

## 2017-12-14 ENCOUNTER — OFFICE VISIT (OUTPATIENT)
Dept: SURGERY | Facility: CLINIC | Age: 82
End: 2017-12-14

## 2017-12-14 VITALS
WEIGHT: 228 LBS | HEIGHT: 71 IN | SYSTOLIC BLOOD PRESSURE: 118 MMHG | HEART RATE: 60 BPM | TEMPERATURE: 98 F | BODY MASS INDEX: 31.92 KG/M2 | DIASTOLIC BLOOD PRESSURE: 74 MMHG

## 2017-12-14 DIAGNOSIS — I48.0 PAROXYSMAL ATRIAL FIBRILLATION (HCC): ICD-10-CM

## 2017-12-14 DIAGNOSIS — I48.91 ATRIAL FIBRILLATION WITH RAPID VENTRICULAR RESPONSE (HCC): ICD-10-CM

## 2017-12-14 DIAGNOSIS — D12.6 ADENOMATOUS POLYP OF COLON, UNSPECIFIED PART OF COLON: ICD-10-CM

## 2017-12-14 DIAGNOSIS — R15.9 ANAL SPHINCTER INCONTINENCE: ICD-10-CM

## 2017-12-14 DIAGNOSIS — D68.9 COAGULOPATHY (HCC): ICD-10-CM

## 2017-12-14 DIAGNOSIS — K64.2 PROLAPSED INTERNAL HEMORRHOIDS, GRADE 3: Primary | ICD-10-CM

## 2017-12-14 DIAGNOSIS — L29.0 PRURITUS ANI: ICD-10-CM

## 2017-12-14 DIAGNOSIS — D64.9 ANEMIA, UNSPECIFIED TYPE: ICD-10-CM

## 2017-12-14 PROCEDURE — 99214 OFFICE O/P EST MOD 30 MIN: CPT | Performed by: COLON & RECTAL SURGERY

## 2017-12-14 PROCEDURE — 46600 DIAGNOSTIC ANOSCOPY SPX: CPT | Performed by: COLON & RECTAL SURGERY

## 2017-12-14 RX ORDER — APIXABAN 5 MG/1
TABLET, FILM COATED ORAL
Refills: 3 | Status: ON HOLD | COMMUNITY
Start: 2017-12-05 | End: 2018-06-13

## 2017-12-14 RX ORDER — OMEPRAZOLE 20 MG/1
20 CAPSULE, DELAYED RELEASE ORAL
COMMUNITY
End: 2018-05-17

## 2017-12-14 NOTE — PROGRESS NOTES
Follow Up Visit Note       Active Problems      1. Prolapsed internal hemorrhoids, grade 3    2. Paroxysmal atrial fibrillation (HCC)    3. Anal sphincter incontinence    4. Anemia, unspecified type    5.  Adenomatous polyp of colon, unspecified part of col options. Allergies  Lillie has No Known Allergies. Past Medical / Surgical / Social / Family History    The past medical and past surgical history have been reviewed by me today.     Past Medical History:   Diagnosis Date   • Anal spasm    • Arrh Alcohol use:  No              Drug use: No            Other Topics            Concern  Caffeine Concern        Yes    Comment:OCC  Exercise                Yes    Comment:walks occ          Current Outpatient Prescriptions:  PEG 3350-KCl-Na Bicarb-NaCl ( throat and trouble swallowing. Respiratory: Negative for apnea, cough, shortness of breath and wheezing. Cardiovascular: Negative for chest pain, palpitations and leg swelling.    Gastrointestinal: Negative for abdominal distention, abdominal pain, an ([address]  Anal sphincter incontinence  Anemia, unspecified type  Adenomatous polyp of colon, unspecified part of colon  Pruritus ani  Atrial fibrillation with rapid ventricular response ([address]  Coagulopathy ([address]    Plan   This patient presents for further car rectal bleeding, itching, or prolapse of hemorrhoids. I do not recommend further rubber band therapy at this time. My next visit with this patient will be at her colonoscopy.   I have asked her to check in with both her cardiologist, and Dr. Nicanor Andrew, reg

## 2017-12-15 PROBLEM — D68.9 COAGULOPATHY (HCC): Status: ACTIVE | Noted: 2017-12-15

## 2017-12-15 RX ORDER — POLYETHYLENE GLYCOL 3350, SODIUM CHLORIDE, SODIUM BICARBONATE, POTASSIUM CHLORIDE 420; 11.2; 5.72; 1.48 G/4L; G/4L; G/4L; G/4L
POWDER, FOR SOLUTION ORAL
Qty: 1 BOTTLE | Refills: 0 | Status: ON HOLD | OUTPATIENT
Start: 2017-12-15 | End: 2018-06-13 | Stop reason: ALTCHOICE

## 2017-12-15 NOTE — PATIENT INSTRUCTIONS
This patient presents for further care treatment regarding internal hemorrhoids and colon polyps. Since her last visit, November 24, 2017, the patient was admitted to [facility] for new onset atrial fibrillation with rapid ventricular response. cardiologist, and Dr. Grazyna Nugent, regarding the proper timing for the colonoscopy, as well as the cessation of her Eliquis for 3 days.   I have asked her to please remind them that if we do remove a polyp, she will have to be off the Eliquis for up to 14 days a

## 2017-12-16 NOTE — PROCEDURES
Procedure:  Anoscopy    Surgeon: Nasrin Jeffries    Anesthesia: None    Findings: See the progress note attached for all findings    Operative Summary: The patient was placed in a prone position on the proctoscopy table, the hips were flexed in the jackknife p

## 2017-12-22 ENCOUNTER — PRIOR ORIGINAL RECORDS (OUTPATIENT)
Dept: OTHER | Age: 82
End: 2017-12-22

## 2017-12-22 LAB
ALBUMIN: 4 G/DL
ALBUMIN: 4.5 G/DL
ALKALINE PHOSPHATATE(ALK PHOS): 107 IU/L
ALKALINE PHOSPHATATE(ALK PHOS): 99 IU/L
BILIRUBIN TOTAL: 0.7 MG/DL
BILIRUBIN TOTAL: 0.9 MG/DL
BUN: 18 MG/DL
BUN: 21 MG/DL
BUN: 29 MG/DL
CALCIUM: 9 MG/DL
CALCIUM: 9.5 MG/DL
CALCIUM: 9.7 MG/DL
CHLORIDE: 105 MEQ/L
CHLORIDE: 105 MEQ/L
CHLORIDE: 109 MEQ/L
CREATININE, SERUM: 1.15 MG/DL
CREATININE, SERUM: 1.34 MG/DL
CREATININE, SERUM: 1.44 MG/DL
GLUCOSE: 110 MG/DL
GLUCOSE: 91 MG/DL
GLUCOSE: 92 MG/DL
HEMATOCRIT: 36.3 %
HEMATOCRIT: 36.8 %
HEMOGLOBIN: 11.5 G/DL
HEMOGLOBIN: 11.8 G/DL
MAGNESIUM: 2.3 MG/DL
MAGNESIUM: 2.3 MG/DL
PLATELETS: 150 K/UL
PLATELETS: 184 K/UL
POTASSIUM, SERUM: 3.8 MEQ/L
POTASSIUM, SERUM: 4.2 MEQ/L
POTASSIUM, SERUM: 4.2 MEQ/L
PROTEIN, TOTAL: 7.1 G/DL
PROTEIN, TOTAL: 8 G/DL
RED BLOOD COUNT: 3.85 X 10-6/U
RED BLOOD COUNT: 3.91 X 10-6/U
SGOT (AST): 14 IU/L
SGOT (AST): 37 IU/L
SGPT (ALT): 33 IU/L
SGPT (ALT): 54 IU/L
SODIUM: 139 MEQ/L
SODIUM: 141 MEQ/L
SODIUM: 144 MEQ/L
THYROID STIMULATING HORMONE: 3.65 MLU/L
WHITE BLOOD COUNT: 4.8 X 10-3/U
WHITE BLOOD COUNT: 5.5 X 10-3/U

## 2017-12-27 ENCOUNTER — TELEPHONE (OUTPATIENT)
Dept: SURGERY | Facility: CLINIC | Age: 82
End: 2017-12-27

## 2017-12-27 NOTE — TELEPHONE ENCOUNTER
Pt was told by Dr Phuong David to go off of her eloquis for 3 days before colonoscopy and her heart Dr is telling her to go off for just one day before. .. She would like both dr's to communicate so that she knows what she needs to do.  Please advise

## 2017-12-29 ENCOUNTER — TELEPHONE (OUTPATIENT)
Dept: SURGERY | Facility: CLINIC | Age: 82
End: 2017-12-29

## 2017-12-29 NOTE — TELEPHONE ENCOUNTER
Verified with patient that she received message to be off Eliqous for 3 days. Patient verbalized understanding.

## 2018-01-09 ENCOUNTER — HOSPITAL ENCOUNTER (OUTPATIENT)
Dept: LAB | Facility: HOSPITAL | Age: 83
Discharge: HOME | End: 2018-01-09
Attending: INTERNAL MEDICINE
Payer: MEDICARE

## 2018-01-09 ENCOUNTER — PRIOR ORIGINAL RECORDS (OUTPATIENT)
Dept: OTHER | Age: 83
End: 2018-01-09

## 2018-01-09 LAB
ALT SERPL-CCNC: 26 U/L (ref 14–54)
AST SERPL-CCNC: 12 U/L (ref 15–41)
FREE T4: 1 NG/DL (ref 0.9–1.8)
TSI SER-ACNC: 4.11 MIU/ML (ref 0.35–5.5)

## 2018-01-09 PROCEDURE — 84450 TRANSFERASE (AST) (SGOT): CPT | Performed by: INTERNAL MEDICINE

## 2018-01-09 PROCEDURE — 84443 ASSAY THYROID STIM HORMONE: CPT | Performed by: INTERNAL MEDICINE

## 2018-01-09 PROCEDURE — 84460 ALANINE AMINO (ALT) (SGPT): CPT | Performed by: INTERNAL MEDICINE

## 2018-01-09 PROCEDURE — 84439 ASSAY OF FREE THYROXINE: CPT | Performed by: INTERNAL MEDICINE

## 2018-01-09 PROCEDURE — 36415 COLL VENOUS BLD VENIPUNCTURE: CPT | Performed by: INTERNAL MEDICINE

## 2018-01-10 ENCOUNTER — PRIOR ORIGINAL RECORDS (OUTPATIENT)
Dept: OTHER | Age: 83
End: 2018-01-10

## 2018-01-10 LAB
ALT (SGPT): 26 U/L
AST (SGOT): 12 U/L
FREE T4: 1 MG/DL
THYROID STIMULATING HORMONE: 4.11 MLU/L

## 2018-03-28 ENCOUNTER — PRIOR ORIGINAL RECORDS (OUTPATIENT)
Dept: OTHER | Age: 83
End: 2018-03-28

## 2018-03-28 ENCOUNTER — HOSPITAL ENCOUNTER (OUTPATIENT)
Dept: LAB | Facility: HOSPITAL | Age: 83
Discharge: HOME | End: 2018-03-28
Attending: NURSE PRACTITIONER
Payer: MEDICARE

## 2018-03-28 ENCOUNTER — OFFICE VISIT (OUTPATIENT)
Dept: WOUND CARE | Facility: HOSPITAL | Age: 83
End: 2018-03-28
Attending: INTERNAL MEDICINE
Payer: MEDICARE

## 2018-03-28 DIAGNOSIS — R60.9 EDEMA: ICD-10-CM

## 2018-03-28 DIAGNOSIS — S81.802A UNSPECIFIED OPEN WOUND, LEFT LOWER LEG, INITIAL ENCOUNTER: Primary | ICD-10-CM

## 2018-03-28 DIAGNOSIS — I87.2 PERIPHERAL VENOUS INSUFFICIENCY: ICD-10-CM

## 2018-03-28 LAB
ALBUMIN SERPL-MCNC: 4.2 G/DL (ref 3.5–4.8)
ALP LIVER SERPL-CCNC: 119 U/L (ref 55–142)
ALT SERPL-CCNC: 35 U/L (ref 14–54)
AST SERPL-CCNC: 19 U/L (ref 15–41)
BASOPHILS # BLD AUTO: 0.02 X10(3) UL (ref 0–0.1)
BASOPHILS NFR BLD AUTO: 0.4 %
BILIRUB SERPL-MCNC: 0.6 MG/DL (ref 0.1–2)
BUN BLD-MCNC: 30 MG/DL (ref 8–20)
CALCIUM BLD-MCNC: 8.9 MG/DL (ref 8.3–10.3)
CHLORIDE: 104 MMOL/L (ref 101–111)
CO2: 28 MMOL/L (ref 22–32)
CREAT BLD-MCNC: 1.37 MG/DL (ref 0.55–1.02)
EOSINOPHIL # BLD AUTO: 0.14 X10(3) UL (ref 0–0.3)
EOSINOPHIL NFR BLD AUTO: 3 %
ERYTHROCYTE [DISTWIDTH] IN BLOOD BY AUTOMATED COUNT: 14.8 % (ref 11.5–16)
GLUCOSE BLD-MCNC: 93 MG/DL (ref 70–99)
HCT VFR BLD AUTO: 35.4 % (ref 34–50)
HGB BLD-MCNC: 11 G/DL (ref 12–16)
IMMATURE GRANULOCYTE COUNT: 0.02 X10(3) UL (ref 0–1)
IMMATURE GRANULOCYTE RATIO %: 0.4 %
LYMPHOCYTES # BLD AUTO: 0.97 X10(3) UL (ref 0.9–4)
LYMPHOCYTES NFR BLD AUTO: 20.9 %
M PROTEIN MFR SERPL ELPH: 7.3 G/DL (ref 6.1–8.3)
MCH RBC QN AUTO: 29.4 PG (ref 27–33.2)
MCHC RBC AUTO-ENTMCNC: 31.1 G/DL (ref 31–37)
MCV RBC AUTO: 94.7 FL (ref 81–100)
MONOCYTES # BLD AUTO: 0.41 X10(3) UL (ref 0.1–1)
MONOCYTES NFR BLD AUTO: 8.8 %
NEUTROPHIL ABS PRELIM: 3.08 X10 (3) UL (ref 1.3–6.7)
NEUTROPHILS # BLD AUTO: 3.08 X10(3) UL (ref 1.3–6.7)
NEUTROPHILS NFR BLD AUTO: 66.5 %
PLATELET # BLD AUTO: 172 10(3)UL (ref 150–450)
POTASSIUM SERPL-SCNC: 4 MMOL/L (ref 3.6–5.1)
PREALBUMIN: 39.8 MG/DL (ref 20–40)
RBC # BLD AUTO: 3.74 X10(6)UL (ref 3.8–5.1)
RED CELL DISTRIBUTION WIDTH-SD: 51.5 FL (ref 35.1–46.3)
SODIUM SERPL-SCNC: 141 MMOL/L (ref 136–144)
WBC # BLD AUTO: 4.6 X10(3) UL (ref 4–13)

## 2018-03-28 PROCEDURE — 80053 COMPREHEN METABOLIC PANEL: CPT | Performed by: NURSE PRACTITIONER

## 2018-03-28 PROCEDURE — 36415 COLL VENOUS BLD VENIPUNCTURE: CPT | Performed by: NURSE PRACTITIONER

## 2018-03-28 PROCEDURE — 84134 ASSAY OF PREALBUMIN: CPT | Performed by: NURSE PRACTITIONER

## 2018-03-28 PROCEDURE — 85025 COMPLETE CBC W/AUTO DIFF WBC: CPT | Performed by: NURSE PRACTITIONER

## 2018-03-28 PROCEDURE — 99214 OFFICE O/P EST MOD 30 MIN: CPT

## 2018-03-28 PROCEDURE — 29581 APPL MULTLAYER CMPRN SYS LEG: CPT

## 2018-03-28 PROCEDURE — 97597 DBRDMT OPN WND 1ST 20 CM/<: CPT

## 2018-03-28 NOTE — PROGRESS NOTES
Progress Note Details  Patient Name: Tierra Echols Date: 3/28/2018   Patient Number: [id number] Physician / Quintin Amaro: Micah Thomas   Patient YOB: 1934 Facility: [facility]    Chief Complaint  This information was obta Wound has slough and granulation buds. No s/s of infection noted. BLE edema noted. Venous stasis changes along with corono phlebactatica BLE. Triphasic signals BLE DP and PT. Bedside ALEXY left 1.08 and right 1.12.  Debridement, enluxtra and coflex 2 layer co Gastrointestinal (GI): Change in Bowel Habits, Nausea / Vomiting / Diarrhea (N/V/D), Loss of Appetite, Difficulty Swallowing, Stomach/abdominal pain  Genitourinary (): Urgency, Frequency  Musculoskeletal: Muscle Weakness, Joint Swelling, Assistive Device BP WNL. Pulse RRR. RR within normal limits. Afebrile. Elevated BMI. Alert, calm, well developed, in no apparent distress.  Height/Length: 71 in (180.34 cm), Weight: 228 lbs (103.64 kgs), BMI: 31.8, Temperature: 98.8 °F (37.11 °C), Pulse: 65 bpm, Respiratory The periwound skin exhibited: Edema, Dry/Scaly, Hemosiderosis. The periwound skin did not exhibit: Induration, Crepitus, Fluctuance, Ecchymosis, Erythema, Pallor, Rubor. The temperature of the periwound skin is Warm.  Periwound skin does not exhibit signs o (Encounter Diagnosis) R60.9 - Edema, unspecified  (Encounter Diagnosis) N18.3 - Chronic kidney disease, stage 3 (moderate)  (Encounter Diagnosis) D68.9 - Coagulation defect, unspecified  (Encounter Diagnosis) Z79.01 - Long term (current) use of anticoagula Return Appointment in 1 week. RN visit for assessment of wound(s).  - Friday  Other: - F/u with  as scheduled    Additional Orders:    Misc/Additional Orders  Increase dietary protein  S/S of Infection    Care summary  Discussed the Plan of Care

## 2018-03-30 ENCOUNTER — OFFICE VISIT (OUTPATIENT)
Dept: WOUND CARE | Facility: HOSPITAL | Age: 83
End: 2018-03-30
Attending: NURSE PRACTITIONER
Payer: MEDICARE

## 2018-03-30 DIAGNOSIS — S81.802A UNSPECIFIED OPEN WOUND, LEFT LOWER LEG, INITIAL ENCOUNTER: Primary | ICD-10-CM

## 2018-03-30 DIAGNOSIS — I87.2 PERIPHERAL VENOUS INSUFFICIENCY: ICD-10-CM

## 2018-03-30 PROCEDURE — 29581 APPL MULTLAYER CMPRN SYS LEG: CPT

## 2018-04-03 ENCOUNTER — OFFICE VISIT (OUTPATIENT)
Dept: WOUND CARE | Facility: HOSPITAL | Age: 83
End: 2018-04-03
Attending: NURSE PRACTITIONER
Payer: MEDICARE

## 2018-04-03 DIAGNOSIS — R60.9 EDEMA: ICD-10-CM

## 2018-04-03 DIAGNOSIS — S81.802A UNSPECIFIED OPEN WOUND, LEFT LOWER LEG, INITIAL ENCOUNTER: Primary | ICD-10-CM

## 2018-04-03 DIAGNOSIS — I87.2 PERIPHERAL VENOUS INSUFFICIENCY: ICD-10-CM

## 2018-04-03 PROCEDURE — 29581 APPL MULTLAYER CMPRN SYS LEG: CPT

## 2018-04-03 NOTE — PROGRESS NOTES
Progress Note Details  Patient Name: Priscilla Horowitz Date: 4/3/2018   Patient Number: [id number] Physician / Chay Chavez: Marquis Bhatt   Patient YOB: 1934 Facility: [facility]    Chief Complaint  This information was obtai This information was obtained from the patient  Patient complains of:   Cardiovascular (Central/Peripheral): Lower extremity (leg) swelling (Bilateral LE edema)  Integumentary (Hair/Skin/Nails):  Hyperpigmentation  Hematologic/Lymphatic: Bleeding Tendency ( Cardiovascular:  Stoney +2 pedal pulses. BLE edema. Musculoskeletal:  Steady gait . Psychiatric:  Intact judgement and insight. Approptiate affect.      Integumentary (Hair, Skin)  Wound #1 Left, Posterior Lower Leg is a chronic Full Thickness Venous U Temperature of Extremity: Warm   Capillary Refill: < 3 seconds   Erythema: No   Dependent Rubor: No   Hyperpigmentation: No   Lipodermatosclerosis: No      Additional Information  Left Posterior Tibial Pulse: Triphasic  Right Posterior Tibial Pulse: Brianna Mane Return Appointment in 1 week. Other: - F/u with  as scheduled    Additional Orders:    Misc/Additional Orders  Increase dietary protein  S/S of Infection    Care summary  Reviewed and evaluated labs.  - 3/28/18-CBC, CMP (low GFR 36, elevated B/C

## 2018-04-10 ENCOUNTER — OFFICE VISIT (OUTPATIENT)
Dept: WOUND CARE | Facility: HOSPITAL | Age: 83
End: 2018-04-10
Attending: NURSE PRACTITIONER
Payer: MEDICARE

## 2018-04-10 DIAGNOSIS — I87.2 PERIPHERAL VENOUS INSUFFICIENCY: ICD-10-CM

## 2018-04-10 DIAGNOSIS — S81.802A UNSPECIFIED OPEN WOUND, LEFT LOWER LEG, INITIAL ENCOUNTER: Primary | ICD-10-CM

## 2018-04-10 PROCEDURE — 29581 APPL MULTLAYER CMPRN SYS LEG: CPT

## 2018-04-10 NOTE — PROGRESS NOTES
Progress Note Details  Patient Name: Viviane Brown Date: 4/10/2018   Patient Number: [id number] Physician / Eusebio Merritt: Sherie Orona   Patient YOB: 1934 Facility: [facility]    Chief Complaint  This information was obta 4/3/18-Pt here for LLE venous ulcer management. Afebrile. Tolerated coflex 2 layer well. Denies any pain. 4/10/18-Here for LLE venous ulcer management. Denies any pain. Afebrile. Tolerated compression wraps well.     Complaints and Symptoms  This informati BP WNL. Pulse RRR. RR within normal limits. Afebrile. Elevated BMI. Alert, calm, well developed, in no apparent distress.  Height/Length: 71 in (180.34 cm), Weight: 228 lbs (103.64 kgs), BMI: 31.8, Temperature: 97.7 °F (36.5 °C), Pulse: 60 bpm, Respiratory Left Extremity Pulses:   Dorsalis Pedis: Palpable  Left Extremity colors, hair growth, and conditions:   Extremity Color: Pigmented   Hair Growth on Extremity: Yes   Temperature of Extremity: Warm   Capillary Refill: < 3 seconds   Erythema: No   Dependent May shower with protection. - Shower boots  Cleanse with saline or wound cleanser  Enluxtra humidifiber  Kerlix  Paper tape  Change dressing every: - Tuesday    Compression Therapy:  Coflex 2 Layer  Avoid prolonged standing in one place.   Elevate leg(s)as

## 2018-04-11 ENCOUNTER — PRIOR ORIGINAL RECORDS (OUTPATIENT)
Dept: OTHER | Age: 83
End: 2018-04-11

## 2018-04-17 ENCOUNTER — OFFICE VISIT (OUTPATIENT)
Dept: WOUND CARE | Facility: HOSPITAL | Age: 83
End: 2018-04-17
Attending: NURSE PRACTITIONER
Payer: MEDICARE

## 2018-04-17 DIAGNOSIS — I87.2 PERIPHERAL VENOUS INSUFFICIENCY: ICD-10-CM

## 2018-04-17 DIAGNOSIS — S81.802A UNSPECIFIED OPEN WOUND, LEFT LOWER LEG, INITIAL ENCOUNTER: Primary | ICD-10-CM

## 2018-04-17 PROCEDURE — 29581 APPL MULTLAYER CMPRN SYS LEG: CPT

## 2018-04-17 NOTE — PROGRESS NOTES
Progress Note Details  Patient Name: Blanche Rueda Date: 4/17/2018   Patient Number: [id number] Physician / Humble Rodriguez: Mercedes Rowland   Patient YOB: 1934 Facility: [facility]    Chief Complaint  This information was obta 4/3/18-Pt here for LLE venous ulcer management. Afebrile. Tolerated coflex 2 layer well. Denies any pain. 4/10/18-Here for LLE venous ulcer management. Denies any pain. Afebrile. Tolerated compression wraps well.   4/17/18-Pt is here for LLE venous ulcer m BP WNL. Pulse RRR. RR within normal limits. Afebrile. Elevated BMI. Alert, calm, well developed, in no apparent distress.  Height/Length: 71 in (180.34 cm), Weight: 228 lbs (103.64 kgs), BMI: 31.8, Temperature: 98.4 °F (36.89 °C), Pulse: 60 bpm, Respiratory Extremity Color: Pigmented   Hair Growth on Extremity: Yes   Temperature of Extremity: Warm   Capillary Refill: < 3 seconds   Erythema: No   Dependent Rubor: No   Hyperpigmentation: Yes   Lipodermatosclerosis: No  Right Extremity colors, hair growth, and c Kerlix  Paper tape  Change dressing every: - Tuesday    Compression Therapy:  Coflex 2 Layer  Avoid prolonged standing in one place. Elevate leg(s)as much as possible. Take you diuretics as directed.     Follow-Up Appointments  Return Appointment in 1 we

## 2018-04-24 ENCOUNTER — OFFICE VISIT (OUTPATIENT)
Dept: WOUND CARE | Facility: HOSPITAL | Age: 83
End: 2018-04-24
Attending: NURSE PRACTITIONER
Payer: MEDICARE

## 2018-04-24 DIAGNOSIS — S81.802A UNSPECIFIED OPEN WOUND, LEFT LOWER LEG, INITIAL ENCOUNTER: Primary | ICD-10-CM

## 2018-04-24 DIAGNOSIS — I87.2 PERIPHERAL VENOUS INSUFFICIENCY: ICD-10-CM

## 2018-04-24 DIAGNOSIS — R60.9 EDEMA: ICD-10-CM

## 2018-04-24 PROCEDURE — 99213 OFFICE O/P EST LOW 20 MIN: CPT

## 2018-04-25 NOTE — PROGRESS NOTES
Progress Note Details  Patient Name: Maria Isabel Kim Date: 4/24/2018   Patient Number: [id number] Physician / Orquidea Ear: Mariluz Ocampo   Patient YOB: 1934 Facility: [facility]    Chief Complaint  This information was obta 4/10/18-Here for LLE venous ulcer management. Denies any pain. Afebrile. Tolerated compression wraps well. 4/17/18-Pt is here for LLE venous ulcer management. Afebrile and denies any pain. Compression wraps tolerated well past week.    4/24/18-here for LLE BP WNL. Pulse RRR. RR within normal limits. Afebrile. Elevated BMI. Alert, calm, well developed, in no apparent distress.  Height/Length: 71 in (180.34 cm), Weight: 228 lbs (103.64 kgs), BMI: 31.8, Temperature: 96.9 °F (36.06 °C), Pulse: 61 bpm, Respiratory Lipodermatosclerosis: No  Right Extremity colors, hair growth, and conditions:   Extremity Color: Pigmented   Hair Growth on Extremity: Yes   Temperature of Extremity: Warm   Capillary Refill: < 3 seconds   Erythema: No   Dependent Rubor: No   Hyperpigment

## 2018-05-15 ENCOUNTER — PRIOR ORIGINAL RECORDS (OUTPATIENT)
Dept: OTHER | Age: 83
End: 2018-05-15

## 2018-05-17 ENCOUNTER — PRIOR ORIGINAL RECORDS (OUTPATIENT)
Dept: OTHER | Age: 83
End: 2018-05-17

## 2018-05-17 ENCOUNTER — MYAURORA ACCOUNT LINK (OUTPATIENT)
Dept: OTHER | Age: 83
End: 2018-05-17

## 2018-05-17 RX ORDER — ACETAMINOPHEN 325 MG/1
325 TABLET ORAL EVERY 6 HOURS PRN
Status: ON HOLD | COMMUNITY
End: 2018-06-13

## 2018-05-17 RX ORDER — CHOLECALCIFEROL (VITAMIN D3) 125 MCG
500 CAPSULE ORAL DAILY
COMMUNITY

## 2018-05-17 RX ORDER — CEFAZOLIN SODIUM/WATER 2 G/20 ML
2 SYRINGE (ML) INTRAVENOUS ONCE
Status: CANCELLED | OUTPATIENT
Start: 2018-05-17 | End: 2018-05-17

## 2018-05-17 RX ORDER — TRAMADOL HYDROCHLORIDE 50 MG/1
50 TABLET ORAL EVERY 6 HOURS PRN
Status: ON HOLD | COMMUNITY
End: 2018-06-15

## 2018-05-30 ENCOUNTER — LABORATORY ENCOUNTER (OUTPATIENT)
Dept: LAB | Facility: HOSPITAL | Age: 83
End: 2018-05-30
Attending: ORTHOPAEDIC SURGERY
Payer: MEDICARE

## 2018-05-30 DIAGNOSIS — M16.12 OSTEOARTHRITIS OF LEFT HIP: ICD-10-CM

## 2018-05-30 PROCEDURE — 85730 THROMBOPLASTIN TIME PARTIAL: CPT

## 2018-05-30 PROCEDURE — 36415 COLL VENOUS BLD VENIPUNCTURE: CPT

## 2018-05-30 PROCEDURE — 86901 BLOOD TYPING SEROLOGIC RH(D): CPT

## 2018-05-30 PROCEDURE — 87081 CULTURE SCREEN ONLY: CPT

## 2018-05-30 PROCEDURE — 85610 PROTHROMBIN TIME: CPT

## 2018-05-30 PROCEDURE — 86900 BLOOD TYPING SEROLOGIC ABO: CPT

## 2018-05-30 PROCEDURE — 86850 RBC ANTIBODY SCREEN: CPT

## 2018-05-30 PROCEDURE — 80053 COMPREHEN METABOLIC PANEL: CPT

## 2018-05-30 PROCEDURE — 85025 COMPLETE CBC W/AUTO DIFF WBC: CPT

## 2018-06-04 PROBLEM — M16.12 PRIMARY OSTEOARTHRITIS OF LEFT HIP: Status: ACTIVE | Noted: 2018-06-04

## 2018-06-07 LAB
ALBUMIN: 4.2 G/DL
ALKALINE PHOSPHATATE(ALK PHOS): 119 IU/L
BILIRUBIN TOTAL: 0.6 MG/DL
BUN: 30 MG/DL
CALCIUM: 8.9 MG/DL
CHLORIDE: 104 MEQ/L
CREATININE, SERUM: 1.37 MG/DL
GLUCOSE: 93 MG/DL
HEMATOCRIT: 35.4 %
HEMOGLOBIN: 11 G/DL
PLATELETS: 172 K/UL
POTASSIUM, SERUM: 4 MEQ/L
PROTEIN, TOTAL: 7.3 G/DL
RED BLOOD COUNT: 3.74 X 10-6/U
SGOT (AST): 19 IU/L
SGPT (ALT): 35 IU/L
SODIUM: 141 MEQ/L
WHITE BLOOD COUNT: 4.6 X 10-3/U

## 2018-06-12 RX ORDER — SODIUM CHLORIDE, SODIUM LACTATE, POTASSIUM CHLORIDE, CALCIUM CHLORIDE 600; 310; 30; 20 MG/100ML; MG/100ML; MG/100ML; MG/100ML
INJECTION, SOLUTION INTRAVENOUS CONTINUOUS
Status: CANCELLED | OUTPATIENT
Start: 2018-06-12

## 2018-06-12 NOTE — H&P
[address] Patient Status:  Surgery Admit    1934 MRN [id number]   [facility] SURGERY Attending Wyatt Santiago MD   Hosp Day # 0 PCP Moshe Crespo MD     Date of Admission:  (Not on reports that she has never smoked. She has never used smokeless tobacco. She reports that she does not drink alcohol or use drugs. Allergies:  No Known Allergies    Home Medications:    No prescriptions prior to admission.     Physical Exam:   General: A

## 2018-06-13 ENCOUNTER — SURGERY (OUTPATIENT)
Age: 83
End: 2018-06-13

## 2018-06-13 ENCOUNTER — HOSPITAL ENCOUNTER (INPATIENT)
Facility: HOSPITAL | Age: 83
LOS: 2 days | Discharge: NURSING, REHAB OR LONG-TERM CARE | DRG: 470 | End: 2018-06-15
Attending: ORTHOPAEDIC SURGERY | Admitting: ORTHOPAEDIC SURGERY
Payer: MEDICARE

## 2018-06-13 ENCOUNTER — ANESTHESIA EVENT (OUTPATIENT)
Dept: SURGERY | Facility: HOSPITAL | Age: 83
DRG: 470 | End: 2018-06-13
Payer: MEDICARE

## 2018-06-13 ENCOUNTER — ANESTHESIA (OUTPATIENT)
Dept: SURGERY | Facility: HOSPITAL | Age: 83
DRG: 470 | End: 2018-06-13
Payer: MEDICARE

## 2018-06-13 ENCOUNTER — APPOINTMENT (OUTPATIENT)
Dept: GENERAL RADIOLOGY | Facility: HOSPITAL | Age: 83
DRG: 470 | End: 2018-06-13
Attending: ORTHOPAEDIC SURGERY
Payer: MEDICARE

## 2018-06-13 DIAGNOSIS — M16.12 OSTEOARTHRITIS OF LEFT HIP: Primary | ICD-10-CM

## 2018-06-13 DIAGNOSIS — M16.12 PRIMARY OSTEOARTHRITIS OF LEFT HIP: ICD-10-CM

## 2018-06-13 PROBLEM — I48.91 ATRIAL FIBRILLATION WITH RAPID VENTRICULAR RESPONSE (HCC): Status: RESOLVED | Noted: 2017-11-21 | Resolved: 2018-06-13

## 2018-06-13 PROCEDURE — 3E0T3BZ INTRODUCTION OF ANESTHETIC AGENT INTO PERIPHERAL NERVES AND PLEXI, PERCUTANEOUS APPROACH: ICD-10-PCS | Performed by: ANESTHESIOLOGY

## 2018-06-13 PROCEDURE — 88304 TISSUE EXAM BY PATHOLOGIST: CPT | Performed by: ORTHOPAEDIC SURGERY

## 2018-06-13 PROCEDURE — 85610 PROTHROMBIN TIME: CPT

## 2018-06-13 PROCEDURE — 0SRB0JA REPLACEMENT OF LEFT HIP JOINT WITH SYNTHETIC SUBSTITUTE, UNCEMENTED, OPEN APPROACH: ICD-10-PCS | Performed by: ORTHOPAEDIC SURGERY

## 2018-06-13 PROCEDURE — 88311 DECALCIFY TISSUE: CPT | Performed by: ORTHOPAEDIC SURGERY

## 2018-06-13 PROCEDURE — 73501 X-RAY EXAM HIP UNI 1 VIEW: CPT | Performed by: ORTHOPAEDIC SURGERY

## 2018-06-13 PROCEDURE — 76942 ECHO GUIDE FOR BIOPSY: CPT | Performed by: ORTHOPAEDIC SURGERY

## 2018-06-13 DEVICE — PINNACLE HIP SOLUTIONS ALTRX POLYETHYLENE ACETABULAR LINER NEUTRAL 36MM ID 56MM OD
Type: IMPLANTABLE DEVICE | Site: HIP | Status: FUNCTIONAL
Brand: PINNACLE ALTRX

## 2018-06-13 DEVICE — PINNACLE POROCOAT ACETABULAR SHELL SECTOR II 56MM OD
Type: IMPLANTABLE DEVICE | Site: HIP | Status: FUNCTIONAL
Brand: PINNACLE POROCOAT

## 2018-06-13 DEVICE — PINNACLE CANCELLOUS BONE SCREW 6.5MM X 25MM
Type: IMPLANTABLE DEVICE | Site: HIP | Status: FUNCTIONAL
Brand: PINNACLE

## 2018-06-13 DEVICE — CORAIL HIP SYSTEM CEMENTLESS FEMORAL STEM 12/14 AMT 135 DEGREES KHO SIZE 12 HA COATED HIGH OFFSET NO COLLAR
Type: IMPLANTABLE DEVICE | Site: HIP | Status: FUNCTIONAL
Brand: CORAIL

## 2018-06-13 DEVICE — M-SPEC METAL FEMORAL HEAD 12/14 TAPER DIAMETER 36MM +5: Type: IMPLANTABLE DEVICE | Site: HIP | Status: FUNCTIONAL

## 2018-06-13 RX ORDER — DIPHENHYDRAMINE HCL 25 MG
25 CAPSULE ORAL EVERY 4 HOURS PRN
Status: DISCONTINUED | OUTPATIENT
Start: 2018-06-13 | End: 2018-06-15

## 2018-06-13 RX ORDER — OXYCODONE HYDROCHLORIDE 5 MG/1
5 TABLET ORAL EVERY 4 HOURS PRN
Status: DISCONTINUED | OUTPATIENT
Start: 2018-06-13 | End: 2018-06-14

## 2018-06-13 RX ORDER — ACETAMINOPHEN 325 MG/1
650 TABLET ORAL 4 TIMES DAILY
Status: DISCONTINUED | OUTPATIENT
Start: 2018-06-13 | End: 2018-06-14

## 2018-06-13 RX ORDER — CEFAZOLIN SODIUM/WATER 2 G/20 ML
2 SYRINGE (ML) INTRAVENOUS EVERY 8 HOURS
Status: COMPLETED | OUTPATIENT
Start: 2018-06-13 | End: 2018-06-14

## 2018-06-13 RX ORDER — TIZANIDINE 2 MG/1
2 TABLET ORAL 3 TIMES DAILY PRN
Status: DISCONTINUED | OUTPATIENT
Start: 2018-06-13 | End: 2018-06-15

## 2018-06-13 RX ORDER — DIPHENHYDRAMINE HYDROCHLORIDE 50 MG/ML
12.5 INJECTION INTRAMUSCULAR; INTRAVENOUS EVERY 4 HOURS PRN
Status: DISCONTINUED | OUTPATIENT
Start: 2018-06-13 | End: 2018-06-15

## 2018-06-13 RX ORDER — ZOLPIDEM TARTRATE 5 MG/1
5 TABLET ORAL NIGHTLY PRN
Status: DISCONTINUED | OUTPATIENT
Start: 2018-06-13 | End: 2018-06-15

## 2018-06-13 RX ORDER — METOCLOPRAMIDE HYDROCHLORIDE 5 MG/ML
10 INJECTION INTRAMUSCULAR; INTRAVENOUS AS NEEDED
Status: DISCONTINUED | OUTPATIENT
Start: 2018-06-13 | End: 2018-06-13 | Stop reason: HOSPADM

## 2018-06-13 RX ORDER — DIPHENHYDRAMINE HYDROCHLORIDE 50 MG/ML
25 INJECTION INTRAMUSCULAR; INTRAVENOUS ONCE AS NEEDED
Status: ACTIVE | OUTPATIENT
Start: 2018-06-13 | End: 2018-06-13

## 2018-06-13 RX ORDER — POLYETHYLENE GLYCOL 3350 17 G/17G
17 POWDER, FOR SOLUTION ORAL DAILY PRN
Status: DISCONTINUED | OUTPATIENT
Start: 2018-06-13 | End: 2018-06-15

## 2018-06-13 RX ORDER — AMIODARONE HYDROCHLORIDE 200 MG/1
200 TABLET ORAL DAILY
Status: DISCONTINUED | OUTPATIENT
Start: 2018-06-14 | End: 2018-06-15

## 2018-06-13 RX ORDER — ONDANSETRON 2 MG/ML
4 INJECTION INTRAMUSCULAR; INTRAVENOUS EVERY 4 HOURS PRN
Status: DISPENSED | OUTPATIENT
Start: 2018-06-13 | End: 2018-06-15

## 2018-06-13 RX ORDER — ACETAMINOPHEN 500 MG
1000 TABLET ORAL ONCE
Status: ON HOLD | COMMUNITY
End: 2018-06-13

## 2018-06-13 RX ORDER — HYDROCODONE BITARTRATE AND ACETAMINOPHEN 10; 325 MG/1; MG/1
1 TABLET ORAL AS NEEDED
Status: DISCONTINUED | OUTPATIENT
Start: 2018-06-13 | End: 2018-06-13 | Stop reason: HOSPADM

## 2018-06-13 RX ORDER — NALOXONE HYDROCHLORIDE 0.4 MG/ML
80 INJECTION, SOLUTION INTRAMUSCULAR; INTRAVENOUS; SUBCUTANEOUS AS NEEDED
Status: DISCONTINUED | OUTPATIENT
Start: 2018-06-13 | End: 2018-06-13 | Stop reason: HOSPADM

## 2018-06-13 RX ORDER — HYDROMORPHONE HYDROCHLORIDE 1 MG/ML
0.4 INJECTION, SOLUTION INTRAMUSCULAR; INTRAVENOUS; SUBCUTANEOUS EVERY 5 MIN PRN
Status: DISCONTINUED | OUTPATIENT
Start: 2018-06-13 | End: 2018-06-13 | Stop reason: HOSPADM

## 2018-06-13 RX ORDER — HYDROMORPHONE HYDROCHLORIDE 1 MG/ML
0.5 INJECTION, SOLUTION INTRAMUSCULAR; INTRAVENOUS; SUBCUTANEOUS EVERY 5 MIN PRN
Status: DISCONTINUED | OUTPATIENT
Start: 2018-06-13 | End: 2018-06-13 | Stop reason: HOSPADM

## 2018-06-13 RX ORDER — HYDROMORPHONE HYDROCHLORIDE 1 MG/ML
0.8 INJECTION, SOLUTION INTRAMUSCULAR; INTRAVENOUS; SUBCUTANEOUS EVERY 2 HOUR PRN
Status: ACTIVE | OUTPATIENT
Start: 2018-06-13 | End: 2018-06-15

## 2018-06-13 RX ORDER — METOCLOPRAMIDE HYDROCHLORIDE 5 MG/ML
10 INJECTION INTRAMUSCULAR; INTRAVENOUS EVERY 6 HOURS PRN
Status: ACTIVE | OUTPATIENT
Start: 2018-06-13 | End: 2018-06-15

## 2018-06-13 RX ORDER — SENNOSIDES 8.6 MG
17.2 TABLET ORAL NIGHTLY
Status: DISCONTINUED | OUTPATIENT
Start: 2018-06-13 | End: 2018-06-15

## 2018-06-13 RX ORDER — HYDROCODONE BITARTRATE AND ACETAMINOPHEN 10; 325 MG/1; MG/1
1-2 TABLET ORAL EVERY 4 HOURS PRN
Qty: 80 TABLET | Refills: 0 | Status: SHIPPED | OUTPATIENT
Start: 2018-06-13 | End: 2018-10-09 | Stop reason: ALTCHOICE

## 2018-06-13 RX ORDER — OXYCODONE HYDROCHLORIDE 10 MG/1
10 TABLET ORAL EVERY 4 HOURS PRN
Status: DISCONTINUED | OUTPATIENT
Start: 2018-06-13 | End: 2018-06-14

## 2018-06-13 RX ORDER — HYDROCODONE BITARTRATE AND ACETAMINOPHEN 10; 325 MG/1; MG/1
2 TABLET ORAL AS NEEDED
Status: DISCONTINUED | OUTPATIENT
Start: 2018-06-13 | End: 2018-06-13 | Stop reason: HOSPADM

## 2018-06-13 RX ORDER — PRAVASTATIN SODIUM 20 MG
40 TABLET ORAL NIGHTLY
Status: DISCONTINUED | OUTPATIENT
Start: 2018-06-13 | End: 2018-06-15

## 2018-06-13 RX ORDER — ACETAMINOPHEN 500 MG
1000 TABLET ORAL ONCE
Status: DISCONTINUED | OUTPATIENT
Start: 2018-06-13 | End: 2018-06-13 | Stop reason: HOSPADM

## 2018-06-13 RX ORDER — METOPROLOL SUCCINATE 25 MG/1
25 TABLET, EXTENDED RELEASE ORAL
Status: DISCONTINUED | OUTPATIENT
Start: 2018-06-14 | End: 2018-06-14

## 2018-06-13 RX ORDER — MELATONIN
325
Status: DISCONTINUED | OUTPATIENT
Start: 2018-06-14 | End: 2018-06-15

## 2018-06-13 RX ORDER — ACETAMINOPHEN 325 MG/1
650 TABLET ORAL ONCE
Status: COMPLETED | OUTPATIENT
Start: 2018-06-13 | End: 2018-06-13

## 2018-06-13 RX ORDER — HYDROMORPHONE HYDROCHLORIDE 1 MG/ML
0.2 INJECTION, SOLUTION INTRAMUSCULAR; INTRAVENOUS; SUBCUTANEOUS EVERY 2 HOUR PRN
Status: ACTIVE | OUTPATIENT
Start: 2018-06-13 | End: 2018-06-15

## 2018-06-13 RX ORDER — ACETAMINOPHEN 325 MG/1
650 TABLET ORAL EVERY 6 HOURS PRN
Status: DISCONTINUED | OUTPATIENT
Start: 2018-06-13 | End: 2018-06-13 | Stop reason: HOSPADM

## 2018-06-13 RX ORDER — SODIUM CHLORIDE, SODIUM LACTATE, POTASSIUM CHLORIDE, CALCIUM CHLORIDE 600; 310; 30; 20 MG/100ML; MG/100ML; MG/100ML; MG/100ML
INJECTION, SOLUTION INTRAVENOUS CONTINUOUS
Status: DISCONTINUED | OUTPATIENT
Start: 2018-06-13 | End: 2018-06-15

## 2018-06-13 RX ORDER — SODIUM CHLORIDE, SODIUM LACTATE, POTASSIUM CHLORIDE, CALCIUM CHLORIDE 600; 310; 30; 20 MG/100ML; MG/100ML; MG/100ML; MG/100ML
INJECTION, SOLUTION INTRAVENOUS CONTINUOUS
Status: DISCONTINUED | OUTPATIENT
Start: 2018-06-13 | End: 2018-06-13 | Stop reason: HOSPADM

## 2018-06-13 RX ORDER — OXYCODONE HYDROCHLORIDE 15 MG/1
15 TABLET ORAL EVERY 4 HOURS PRN
Status: DISCONTINUED | OUTPATIENT
Start: 2018-06-13 | End: 2018-06-14

## 2018-06-13 RX ORDER — ACETAMINOPHEN 325 MG/1
TABLET ORAL
Status: COMPLETED
Start: 2018-06-13 | End: 2018-06-13

## 2018-06-13 RX ORDER — BISACODYL 10 MG
10 SUPPOSITORY, RECTAL RECTAL
Status: DISCONTINUED | OUTPATIENT
Start: 2018-06-13 | End: 2018-06-15

## 2018-06-13 RX ORDER — ONDANSETRON 2 MG/ML
4 INJECTION INTRAMUSCULAR; INTRAVENOUS AS NEEDED
Status: DISCONTINUED | OUTPATIENT
Start: 2018-06-13 | End: 2018-06-13 | Stop reason: HOSPADM

## 2018-06-13 RX ORDER — DOCUSATE SODIUM 100 MG/1
100 CAPSULE, LIQUID FILLED ORAL 2 TIMES DAILY
Status: DISCONTINUED | OUTPATIENT
Start: 2018-06-13 | End: 2018-06-15

## 2018-06-13 RX ORDER — SODIUM PHOSPHATE, DIBASIC AND SODIUM PHOSPHATE, MONOBASIC 7; 19 G/133ML; G/133ML
1 ENEMA RECTAL ONCE AS NEEDED
Status: DISCONTINUED | OUTPATIENT
Start: 2018-06-13 | End: 2018-06-15

## 2018-06-13 RX ORDER — HYDROMORPHONE HYDROCHLORIDE 1 MG/ML
0.4 INJECTION, SOLUTION INTRAMUSCULAR; INTRAVENOUS; SUBCUTANEOUS EVERY 2 HOUR PRN
Status: DISPENSED | OUTPATIENT
Start: 2018-06-13 | End: 2018-06-15

## 2018-06-13 NOTE — ANESTHESIA POSTPROCEDURE EVALUATION
[address] Patient Status:  Surgery Admit   Age/Gender 80year old female MRN [id number]   Location [address] [facility] Attending Mann Mierles MD   Hosp Day # 0 PCP Leif Awad MD       Anesthesia

## 2018-06-13 NOTE — PHYSICAL THERAPY NOTE
Order received for PT eval. Attempted to see Pt this afternoon, however, Pt reports L LE remains numb. Will follow up 6/14/18.

## 2018-06-13 NOTE — ANESTHESIA PREPROCEDURE EVALUATION
PRE-OP EVALUATION    Patient Name: [facility]    Pre-op Diagnosis: Primary osteoarthritis of left hip [M16.12]    Procedure(s):  LEFT TOTAL HIP REPLACEMENT    Surgeon(s) and Role:     Cruz Zarate MD - Primary    Pre-op vitals reviewed. GI/Hepatic/Renal                                 Cardiovascular                (+) obesity  (+) hypertension   (+) hyperlipidemia               (+) dysrhythmias                   Endo/Other                           (+) arthritis       Pulmonary Admission:  **None**

## 2018-06-13 NOTE — OPERATIVE REPORT
PREOPERATIVE DIAGNOSIS: Severe osteoarthritis left hip. POSTOPERATIVE DIAGNOSIS: Severe osteoarthritis left hip. PROCEDURE PERFORMED: Non-cemented left total hip arthroplasty. SURGEON:  Alexandre Griffin M.D.   FIRST ASSISTANT: EVANGELINA Quinn used.. A neutral liner to fit the acetabulum and to accept a 36 mm femoral head was impacted into place. Attention was turned to the femur. A cylinder osteotome was used to clean out the greater trochanter. A starting awl was used.  Broaches for the [place]

## 2018-06-13 NOTE — ANESTHESIA POSTPROCEDURE EVALUATION
[address] Patient Status:  Surgery Admit   Age/Gender 80year old female MRN [id number]   Location [facility] Attending Geraldo Barroso MD   Hosp Day # 0 PCP Daniel Romero MD       Anesthesia

## 2018-06-14 PROBLEM — M16.12 OSTEOARTHRITIS OF LEFT HIP: Status: ACTIVE | Noted: 2018-06-04

## 2018-06-14 PROBLEM — Z22.322 MRSA (METHICILLIN RESISTANT STAPHYLOCOCCUS AUREUS) CARRIER: Status: ACTIVE | Noted: 2018-06-14

## 2018-06-14 PROCEDURE — 80048 BASIC METABOLIC PNL TOTAL CA: CPT | Performed by: FAMILY MEDICINE

## 2018-06-14 PROCEDURE — 97166 OT EVAL MOD COMPLEX 45 MIN: CPT

## 2018-06-14 PROCEDURE — 97530 THERAPEUTIC ACTIVITIES: CPT

## 2018-06-14 PROCEDURE — 83735 ASSAY OF MAGNESIUM: CPT | Performed by: FAMILY MEDICINE

## 2018-06-14 PROCEDURE — 97161 PT EVAL LOW COMPLEX 20 MIN: CPT

## 2018-06-14 PROCEDURE — 85027 COMPLETE CBC AUTOMATED: CPT | Performed by: ORTHOPAEDIC SURGERY

## 2018-06-14 PROCEDURE — 97150 GROUP THERAPEUTIC PROCEDURES: CPT

## 2018-06-14 PROCEDURE — 97535 SELF CARE MNGMENT TRAINING: CPT

## 2018-06-14 PROCEDURE — 97116 GAIT TRAINING THERAPY: CPT

## 2018-06-14 PROCEDURE — 51701 INSERT BLADDER CATHETER: CPT

## 2018-06-14 RX ORDER — HYDROCODONE BITARTRATE AND ACETAMINOPHEN 10; 325 MG/1; MG/1
1 TABLET ORAL EVERY 4 HOURS PRN
Status: DISCONTINUED | OUTPATIENT
Start: 2018-06-14 | End: 2018-06-15

## 2018-06-14 RX ORDER — HYDROCODONE BITARTRATE AND ACETAMINOPHEN 10; 325 MG/1; MG/1
1 TABLET ORAL EVERY 4 HOURS PRN
Status: DISCONTINUED | OUTPATIENT
Start: 2018-06-15 | End: 2018-06-14

## 2018-06-14 RX ORDER — HYDROCODONE BITARTRATE AND ACETAMINOPHEN 10; 325 MG/1; MG/1
2 TABLET ORAL EVERY 4 HOURS PRN
Status: DISCONTINUED | OUTPATIENT
Start: 2018-06-14 | End: 2018-06-15

## 2018-06-14 RX ORDER — HYDROCODONE BITARTRATE AND ACETAMINOPHEN 10; 325 MG/1; MG/1
2 TABLET ORAL EVERY 4 HOURS PRN
Status: DISCONTINUED | OUTPATIENT
Start: 2018-06-15 | End: 2018-06-14

## 2018-06-14 RX ORDER — ALFUZOSIN HYDROCHLORIDE 10 MG/1
10 TABLET, EXTENDED RELEASE ORAL
Status: DISCONTINUED | OUTPATIENT
Start: 2018-06-15 | End: 2018-06-14

## 2018-06-14 RX ORDER — ALFUZOSIN HYDROCHLORIDE 10 MG/1
10 TABLET, EXTENDED RELEASE ORAL NIGHTLY
Status: DISCONTINUED | OUTPATIENT
Start: 2018-06-14 | End: 2018-06-15

## 2018-06-14 NOTE — PROGRESS NOTES
[address] Patient Status:  Inpatient    1934 MRN [id number]   [facility] 3SW-A Attending Kelvin Galvan MD   [facility] Day # 1 PCP Glynn Steinberg MD     Subjective:  LeftTotal Hip Arthroplasty  Systemic or

## 2018-06-14 NOTE — PHYSICAL THERAPY NOTE
PHYSICAL THERAPY HIP TREATMENT NOTE - INPATIENT      Room Number: 362/362-A     Session: 2  Number of Visits to Meet Established Goals: 4    Presenting Problem: S/p Noncemented Left MARY on 06/13/18    Problem List  Principal Problem:    Status post left hi promotion; Body mechanics;Breathing techniques;Relaxation;Repositioning    BALANCE  Static Sitting: Good  Dynamic Sitting: Fair +  Static Standing: Poor +  Dynamic Standing: Poor +  ACTIVITY TOLERANCE  Room air    AM-PAC '6-Clicks' INPATIENT SHORT FORM - BA raises 10 reps 15 reps   Hamstring Curls 10 reps 15 reps   Forward, back steps 10 reps 15 reps   Short Squats 10 reps 15 reps   '    Comments:  Pt participated in group session, tolerance was good.  was present yes  Jann Burch is a daughter.     Patient En w/ assistive device and supervision   Goal #5     Patient verbalizes and/or demonstrates all precautions and safety concerns independently   Goal #6           Goal Comments: Goals established on 6/14/2018 - all goals ongoing as of 6/14/2018

## 2018-06-14 NOTE — PHYSICAL THERAPY NOTE
PHYSICAL THERAPY HIP EVALUATION - INPATIENT     Room Number: 362/362-A  Evaluation Date: 6/14/2018  Type of Evaluation: Initial  Physician Order: PT Eval and Treat    Presenting Problem: S/p Noncemented Left MARY on 06/13/18  Reason for Therapy: Mobility Dy Yes  Patient Owned Equipment: Rolling walker  Patient Regularly Uses: Reading glasses    Prior Level of [place]: Patient was independent with ADLs & self care. Ambulated without AD,though started to use RW lately due to pain in hip. Active in [employer] act -   Moving to and from a bed to a chair (including a wheelchair)?: A Lot   -   Need to walk in hospital room?: A Lot   -   Climbing 3-5 steps with a railing?: Total       AM-PAC Score:  Raw Score: 11   PT Approx Degree of Impairment Score: 72.57%   Stand in left hip,minimal awareness re: anterior hip precautions,pain in left hip @ surgical site, impaired balance. Functional outcome measures completed include AM PAC scorfes.   Based on this evaluation, patient's clinical presentation is stable and overall t

## 2018-06-14 NOTE — OCCUPATIONAL THERAPY NOTE
OCCUPATIONAL THERAPY EVALUATION - INPATIENT     Room Number: 362/362-A  Evaluation Date: 6/14/2018  Type of Evaluation: Initial  Presenting Problem:  (L MARY)    Physician Order: IP Consult to Occupational Therapy  Reason for Therapy: ADL/IADL Dysfunction a and Equipment: Walk-in shower;Grab bar; Shower chair  Other Equipment: Other (Comment) (rw)    Occupation/Status:  (retired teacher)  Hand Dominance: Right  Drives: Yes  Patient Regularly Uses: Reading glasses    Prior Level of Function: Independent, drives None    AM-PAC Score:  Score: 17  Approx Degree of Impairment: 50.11%  Standardized Score (AM-PAC Scale): 37.26  CMS Modifier (G-Code): CK    FUNCTIONAL TRANSFER ASSESSMENT  Supine to Sit : Minimum assistance  Sit to Stand: Minimum assistance    Skilled Th function. The AM-YENNI ' '6-Clicks' Inpatient Daily Activity Short Form was completed and  this patient  is demonstrating a 50.11% degree impairment in activities of daily living.  Research supports that patients with this level of impairment often benefit f precautions without cues

## 2018-06-14 NOTE — PROGRESS NOTES
[facility]  Progress Note    [address] [facility] Patient Status:  Inpatient    1934 MRN [id number]   [facility] 3SW-A Attending Adrian Terrell MD   [facility] Day # 1 PCP Anali Childress MD     POD #1    SUBJECTIVE: No significant c Tartrate (LOPRESSOR) tab 25 mg 25 mg Oral 2x Daily(Beta Blocker)   lactated ringers infusion  Intravenous Continuous   sodium chloride 0.9% IV bolus 500 mL 500 mL Intravenous Once PRN   Senna (SENOKOT) tab TABS 17.2 mg 17.2 mg Oral Nightly   docusate sodiu Problems:    Primary osteoarthritis of left hip    Encounter for long-term (current) use of medications    HTN (hypertension)    Paroxysmal atrial fibrillation (HCC)    Normocytic normochromic anemia    Chronic renal disease, stage III (HCC)    MRSA (methi

## 2018-06-14 NOTE — PROGRESS NOTES
Brief Note: Family Medicine    Consulted for leslie-operative medical management. Will follow. Patient normally on Eliquis 5 mg BID for full stroke prophylaxis related to AF.  Will check renal functions in AM - if Creatinine is < 1.5, she should be on 5 m

## 2018-06-14 NOTE — PLAN OF CARE
Pt up to commode with assist ,unable to void ,got light headed and nauseated ,also had emesis ,assisted back to bed ,blood pressure stable ,will monitor .

## 2018-06-14 NOTE — CM/SW NOTE
06/14/18 1300   CM/SW Referral Data   Referral Source Physician   Reason for Referral Discharge planning   Informant Patient  (dtr Waleska Ascencio)   Pertinent Medical Hx   Primary Care Physician Name French Mendoza   Patient Info   Patient's Mental Status A

## 2018-06-14 NOTE — PHYSICAL THERAPY NOTE
PHYSICAL THERAPY HIP TREATMENT NOTE - INPATIENT      Room Number: 362/362-A     Session: 1  Number of Visits to Meet Established Goals: 4    Presenting Problem: S/p Noncemented Left MARY on 06/13/18    Problem List  Principal Problem:    Status post left hi site  Management Techniques: Activity promotion; Body mechanics;Breathing techniques;Relaxation;Repositioning    BALANCE  Static Sitting: Good  Dynamic Sitting: Fair +  Static Standing: Poor +  Dynamic Standing: Poor +  ACTIVITY TOLERANCE  Room air    AM-PA reps 15 reps   Glut Sets 10 reps 15 reps   Hip Abd/Add 10 reps 15 reps   Heel slides 10 reps 15 reps   Saq 10 reps 15 reps   Sitting Knee Extension 10 reps 15 reps   Standing heel/toe raises 10 reps 15 reps   Hamstring Curls 10 reps 15 reps   Forward, back negotiate 4 stairs/one curb w/ assistive device and supervision   Goal #5     Patient verbalizes and/or demonstrates all precautions and safety concerns independently   Goal #6           Goal Comments: Goals established on 6/14/2018

## 2018-06-14 NOTE — PROGRESS NOTES
Pt still unable to void on own. Straight cath completed. Page sent to Dr Karan Jain to see if he would like to add uroxatral and start norco tonight and dc oxy. Awaiting call back.

## 2018-06-14 NOTE — CM/SW NOTE
Pt is sp total hip replacement. DMG ortho bundle with [facility] plan to discharge to [facility] subacute unit. PT recommending subaucte. Spoke with Alycia Monahan from [facility], she will review pt. SW/CM will see pt today.

## 2018-06-15 ENCOUNTER — PRIOR ORIGINAL RECORDS (OUTPATIENT)
Dept: OTHER | Age: 83
End: 2018-06-15

## 2018-06-15 VITALS
OXYGEN SATURATION: 96 % | RESPIRATION RATE: 18 BRPM | TEMPERATURE: 98 F | HEIGHT: 71 IN | HEART RATE: 68 BPM | WEIGHT: 222.69 LBS | SYSTOLIC BLOOD PRESSURE: 134 MMHG | DIASTOLIC BLOOD PRESSURE: 51 MMHG | BODY MASS INDEX: 31.18 KG/M2

## 2018-06-15 PROCEDURE — 83735 ASSAY OF MAGNESIUM: CPT | Performed by: FAMILY MEDICINE

## 2018-06-15 PROCEDURE — 97116 GAIT TRAINING THERAPY: CPT

## 2018-06-15 PROCEDURE — 85018 HEMOGLOBIN: CPT | Performed by: FAMILY MEDICINE

## 2018-06-15 PROCEDURE — 97535 SELF CARE MNGMENT TRAINING: CPT

## 2018-06-15 PROCEDURE — 80048 BASIC METABOLIC PNL TOTAL CA: CPT | Performed by: FAMILY MEDICINE

## 2018-06-15 PROCEDURE — 85025 COMPLETE CBC W/AUTO DIFF WBC: CPT | Performed by: FAMILY MEDICINE

## 2018-06-15 PROCEDURE — 97150 GROUP THERAPEUTIC PROCEDURES: CPT

## 2018-06-15 PROCEDURE — 85027 COMPLETE CBC AUTOMATED: CPT | Performed by: ORTHOPAEDIC SURGERY

## 2018-06-15 RX ORDER — ALFUZOSIN HYDROCHLORIDE 10 MG/1
10 TABLET, EXTENDED RELEASE ORAL NIGHTLY
Refills: 0 | Status: SHIPPED | COMMUNITY
Start: 2018-06-15 | End: 2018-06-22

## 2018-06-15 RX ORDER — ACETAMINOPHEN 325 MG/1
325 TABLET ORAL EVERY 4 HOURS PRN
Status: DISCONTINUED | OUTPATIENT
Start: 2018-06-15 | End: 2018-06-15

## 2018-06-15 RX ORDER — ACETAMINOPHEN 325 MG/1
650 TABLET ORAL EVERY 4 HOURS PRN
Status: DISCONTINUED | OUTPATIENT
Start: 2018-06-15 | End: 2018-06-15

## 2018-06-15 RX ORDER — POLYETHYLENE GLYCOL 3350 17 G/17G
17 POWDER, FOR SOLUTION ORAL DAILY PRN
Refills: 0 | Status: SHIPPED | COMMUNITY
Start: 2018-06-15 | End: 2019-04-16 | Stop reason: ALTCHOICE

## 2018-06-15 RX ORDER — HYDROCODONE BITARTRATE AND ACETAMINOPHEN 10; 325 MG/1; MG/1
2 TABLET ORAL EVERY 4 HOURS PRN
Refills: 0 | Status: SHIPPED | COMMUNITY
Start: 2018-06-15 | End: 2018-10-09

## 2018-06-15 RX ORDER — HYDROCODONE BITARTRATE AND ACETAMINOPHEN 10; 325 MG/1; MG/1
1 TABLET ORAL EVERY 4 HOURS PRN
Refills: 0 | Status: SHIPPED | COMMUNITY
Start: 2018-06-15 | End: 2018-10-09

## 2018-06-15 NOTE — PROGRESS NOTES
Acute Pain Service    Post Op Day 2 Ortho Note    Assessed patient in chair. Patient rates pain 0/10 at rest and 2-3/10 with activity. Patient reports pain is well managed; denies itching/nausea/dizziness.     Patient able to bear weight on sx leg; equal se

## 2018-06-15 NOTE — PROGRESS NOTES
[facility]  Progress Note    [address] [facility] Patient Status:  Inpatient    1934 MRN [id number]   [facility] 3SW-A Attending Kiet Whitfield MD   [address] Day # 2 PCP Shanika Pearce MD       SUBJECTIVE:  No CP, SOB, or N/V.  Lef Daily(Beta Blocker)   HYDROcodone-acetaminophen (NORCO)  MG per tab 1 tablet 1 tablet Oral Q4H PRN   Or      HYDROcodone-acetaminophen (NORCO)  MG per tab 2 tablet 2 tablet Oral Q4H PRN   Alfuzosin HCl ER (UROXATRAL) 24 hr tab 10 mg 10 mg Oral ([facility] [address]    Normocytic normochromic anemia    Chronic renal disease, stage III (HCC)    MRSA (methicillin resistant Staphylococcus aureus) carrier    Stable from a medical standpoint.  Her Hgb is dropping, as expected, and I think she has some deep \"oozing\"

## 2018-06-15 NOTE — PROGRESS NOTES
NURSING DISCHARGE NOTE    Discharged via wheelchair  Accompanied by daughters  Belongings all sent with the patient  Report given to Melinda of [facility]  Rx for [place] and Eliquis   Discharge instructions discussed with the patient, all questions were answere

## 2018-06-15 NOTE — CM/SW NOTE
06/15/18 1418   Discharge disposition   Expected discharge disposition Home-Health   Name of Facillity/Home Care/Hospice ([facility] )   Discharge transportation Private car

## 2018-06-15 NOTE — OCCUPATIONAL THERAPY NOTE
OCCUPATIONAL THERAPY TREATMENT NOTE - INPATIENT     Room Number: 362/362-A  Session: 1/3  Number of Visits to Meet Established Goals: 3    Presenting Problem: L Total Hip Arthroplasty    History related to current admission: Patient admitted for elective t flexion more than 90*)    WEIGHT BEARING RESTRICTION  Weight Bearing Restriction: L lower extremity           L Lower Extremity: Weight Bearing as Tolerated    PAIN ASSESSMENT  Ratin (0 at rest, 6 when performing functional mobility)  Location: L hip patient able to perform lower body dressing using AD with maximal assistance from therapist to thread leg through undergarments. Patient required increased time to complete LB dressing this session.  Patient able to perform sit<>stand transfer using RW with Daily      OT Goals:   Patient will perform grooming: with supervision and while standing at sink (progressing 6/15, performed in sitting up in chair)  Patient will perform lower body dressing:  with min assist, with adaptive equipment PRN and while Rona Lopez

## 2018-06-15 NOTE — CM/SW NOTE
Repeat HGB is 8.4. Per Piero Walton with [facility] they will accept pt today. She will go to room 2226, would like to dc at [address] [facility] to call report to [phone]. Discussed transportation. Pt has hip precautions but is able to sit in recliner.

## 2018-06-15 NOTE — CM/SW NOTE
Discussed pt during daily rounds. Pt will need repeat HGB at 2pm.  Per RN Victorina Hawkins, Dr. Dasha Cedillo would be ok with discharging pt if HGB is above 7. I spoke with Arlene Haji with [facility]. They will have subacute bed today. They will have bed tomorrow as well.

## 2018-06-15 NOTE — PLAN OF CARE
Assisted patient to commode ,able to void 175 cc of concentrated urine ,bladder scan post void was 250 cc ,encouraged to drink ,patient denies any discomfort ,will continue to monitor output.

## 2018-06-15 NOTE — PHYSICAL THERAPY NOTE
PHYSICAL THERAPY HIP TREATMENT NOTE - INPATIENT      Room Number: 362/362-A     Session: 3  Number of Visits to Meet Established Goals: 4    Presenting Problem: S/p Noncemented Left MARY on 06/13/18    Problem List  Principal Problem:    Status post left hi promotion    BALANCE  Static Sitting: Good  Dynamic Sitting: Fair +  Static Standing: Poor +  Dynamic Standing: Poor +  ACTIVITY TOLERANCE  Room air    AM-PAC '6-Clicks' INPATIENT SHORT FORM - BASIC MOBILITY  How much difficulty does the patient currently Extension 20 reps   Standing heel/toe raises 0 reps   Hamstring Curls 0 reps   Forward, back steps 0 reps   Short Squats 0 reps       '    Comments:  Pt participated in group session, tolerance was fair   was present yes  Peggy Bishop is a daughter.     [place] 6/15/2018

## 2018-06-18 NOTE — DISCHARGE SUMMARY
[facility]  Discharge Summary    [address] [facility] Patient Status:  Inpatient    1934 MRN [id number]   [facility] 3SW-A Attending No att. providers found   Hosp Day # 2 PCP Osmel Esparza MD     Date of Admission: 2018 Haydee Marquez total) by mouth 2 (two) times daily. What changed:  · how much to take  · when to take this     DEMADEX 20 MG Tabs  Generic drug:  torsemide  What changed:  Another medication with the same name was removed.  Continue taking this medication, and follow the

## 2018-06-21 ENCOUNTER — PRIOR ORIGINAL RECORDS (OUTPATIENT)
Dept: OTHER | Age: 83
End: 2018-06-21

## 2018-06-28 PROBLEM — Z96.642 STATUS POST TOTAL REPLACEMENT OF LEFT HIP: Status: ACTIVE | Noted: 2018-06-28

## 2018-07-05 ENCOUNTER — PRIOR ORIGINAL RECORDS (OUTPATIENT)
Dept: OTHER | Age: 83
End: 2018-07-05

## 2018-07-05 LAB
BUN: 9 MG/DL
CALCIUM: 8.4 MG/DL
CHLORIDE: 109 MEQ/L
CREATININE, SERUM: 1.03 MG/DL
GLUCOSE: 124 MG/DL
HEMATOCRIT: 25.9 %
HEMOGLOBIN: 7.9 G/DL
HEMOGLOBIN: 8.4 G/DL
MAGNESIUM: 2 MG/DL
PLATELETS: 149 K/UL
POTASSIUM, SERUM: 4.1 MEQ/L
RED BLOOD COUNT: 2.8 X 10-6/U
SODIUM: 143 MEQ/L
WHITE BLOOD COUNT: 6.6 X 10-3/U

## 2018-08-15 ENCOUNTER — PRIOR ORIGINAL RECORDS (OUTPATIENT)
Dept: OTHER | Age: 83
End: 2018-08-15

## 2018-08-15 ENCOUNTER — HOSPITAL ENCOUNTER (OUTPATIENT)
Dept: LAB | Facility: HOSPITAL | Age: 83
Discharge: HOME | End: 2018-08-15
Attending: INTERNAL MEDICINE
Payer: MEDICARE

## 2018-08-15 DIAGNOSIS — I48.91 ATRIAL FIBRILLATION WITH RAPID VENTRICULAR RESPONSE (HCC): ICD-10-CM

## 2018-08-15 LAB
ALT SERPL-CCNC: 19 U/L (ref 14–54)
AST SERPL-CCNC: 12 U/L (ref 15–41)
T4 FREE SERPL-MCNC: 0.9 NG/DL (ref 0.9–1.8)
TSI SER-ACNC: 4.87 MIU/ML (ref 0.35–5.5)

## 2018-08-15 PROCEDURE — 84460 ALANINE AMINO (ALT) (SGPT): CPT | Performed by: INTERNAL MEDICINE

## 2018-08-15 PROCEDURE — 84439 ASSAY OF FREE THYROXINE: CPT | Performed by: INTERNAL MEDICINE

## 2018-08-15 PROCEDURE — 84443 ASSAY THYROID STIM HORMONE: CPT | Performed by: INTERNAL MEDICINE

## 2018-08-15 PROCEDURE — 36415 COLL VENOUS BLD VENIPUNCTURE: CPT | Performed by: INTERNAL MEDICINE

## 2018-08-15 PROCEDURE — 84450 TRANSFERASE (AST) (SGOT): CPT | Performed by: INTERNAL MEDICINE

## 2018-08-16 LAB
ALT (SGPT): 19 U/L
AST (SGOT): 12 U/L
FREE T4: 0.9 MG/DL
THYROID STIMULATING HORMONE: 4.87 MLU/L

## 2018-08-17 ENCOUNTER — PRIOR ORIGINAL RECORDS (OUTPATIENT)
Dept: OTHER | Age: 83
End: 2018-08-17

## 2018-08-17 ENCOUNTER — MYAURORA ACCOUNT LINK (OUTPATIENT)
Dept: OTHER | Age: 83
End: 2018-08-17

## 2018-08-27 ENCOUNTER — PRIOR ORIGINAL RECORDS (OUTPATIENT)
Dept: OTHER | Age: 83
End: 2018-08-27

## 2018-08-28 ENCOUNTER — PRIOR ORIGINAL RECORDS (OUTPATIENT)
Dept: OTHER | Age: 83
End: 2018-08-28

## 2018-08-28 LAB
ALKALINE PHOSPHATATE(ALK PHOS): 100 IU/L
BILIRUBIN TOTAL: 0.5 MG/DL
BUN: 23 MG/DL
CALCIUM: 9.7 MG/DL
CHLORIDE: 104 MEQ/L
CHOLESTEROL, TOTAL: 150 MG/DL
CREATININE, SERUM: 1.5 MG/DL
GLUCOSE: 93 MG/DL
GLYCOHEMOGLOBIN: 5.8 %
HDL CHOLESTEROL: 53 MG/DL
HEMATOCRIT: 37.2 %
HEMOGLOBIN A1C: 5.1 %
HEMOGLOBIN: 11.2 G/DL
LDL CHOLESTEROL: 77 MG/DL
MAGNESIUM: 2.3 MG/DL
PLATELETS: 175 K/UL
POTASSIUM, SERUM: 4.5 MEQ/L
PROTEIN, TOTAL: 6.4 G/DL
RED BLOOD COUNT: 4.2 X 10-6/U
SGOT (AST): 17 IU/L
SGPT (ALT): 14 IU/L
SODIUM: 144 MEQ/L
TRIGLYCERIDES: 99 MG/DL
VITAMIN D 25-OH: 64.3 NG/ML
WHITE BLOOD COUNT: 4.6 X 10-3/U

## 2018-10-01 ENCOUNTER — TELEPHONE (OUTPATIENT)
Dept: SURGERY | Facility: CLINIC | Age: 83
End: 2018-10-01

## 2018-10-02 NOTE — TELEPHONE ENCOUNTER
oliviatcb to schedule NP appt with the [facility], referred by Dr Marv Gregory for low back and thigh pain;NP paperwork endorsed to Pain front office desk

## 2018-10-03 NOTE — TELEPHONE ENCOUNTER
Pt scheduled NP appt 10/9 with Dr Dwight Castellanos, referred by Dr Scott Renee for low back and thigh pain;NP paperwork endorsed to Pain JUANCHO pena folder in [address] [place] [address]

## 2018-10-03 NOTE — TELEPHONE ENCOUNTER
lmtcb x2 to schedule NP appt with the [facility], referred by Dr Iwona Ramesh for low back and thigh pain;NP paperwork endorsed to Pain Front office desk

## 2018-10-09 ENCOUNTER — TELEPHONE (OUTPATIENT)
Dept: PAIN CLINIC | Facility: CLINIC | Age: 83
End: 2018-10-09

## 2018-10-09 ENCOUNTER — OFFICE VISIT (OUTPATIENT)
Dept: PAIN CLINIC | Facility: CLINIC | Age: 83
End: 2018-10-09
Payer: MEDICARE

## 2018-10-09 VITALS
HEIGHT: 71 IN | BODY MASS INDEX: 31 KG/M2 | SYSTOLIC BLOOD PRESSURE: 116 MMHG | HEART RATE: 65 BPM | OXYGEN SATURATION: 94 % | DIASTOLIC BLOOD PRESSURE: 76 MMHG

## 2018-10-09 DIAGNOSIS — M89.8X1 CHRONIC SCAPULAR PAIN: ICD-10-CM

## 2018-10-09 DIAGNOSIS — G89.29 CHRONIC SCAPULAR PAIN: ICD-10-CM

## 2018-10-09 DIAGNOSIS — M47.816 SPONDYLOSIS OF LUMBAR REGION WITHOUT MYELOPATHY OR RADICULOPATHY: Primary | ICD-10-CM

## 2018-10-09 PROCEDURE — 99203 OFFICE O/P NEW LOW 30 MIN: CPT | Performed by: ANESTHESIOLOGY

## 2018-10-09 RX ORDER — TIZANIDINE 2 MG/1
TABLET ORAL
Refills: 1 | COMMUNITY
Start: 2018-09-11 | End: 2019-04-16 | Stop reason: ALTCHOICE

## 2018-10-09 RX ORDER — IBUPROFEN 200 MG
200 TABLET ORAL EVERY 6 HOURS PRN
COMMUNITY
End: 2019-04-16 | Stop reason: ALTCHOICE

## 2018-10-09 RX ORDER — ACETAMINOPHEN 500 MG
500 TABLET ORAL EVERY 6 HOURS PRN
COMMUNITY

## 2018-10-09 RX ORDER — OMEPRAZOLE 40 MG/1
CAPSULE, DELAYED RELEASE ORAL
COMMUNITY
Start: 2018-08-14

## 2018-10-09 NOTE — PROGRESS NOTES
HPI    Review of Systems      Physical Exam   Constitutional:            ID#[id number]    right sided scapular pain, lower back pain, both reported at 0/10 current pain. Walking increases pain.      Location of Pain: right scapular region, bilateral low back    D

## 2018-10-09 NOTE — PATIENT INSTRUCTIONS
Refill policies:    • Allow 2-3 business days for refills; controlled substances may take longer.   • Contact your pharmacy at least 5 days prior to running out of medication and have them send an electronic request or submit request through the “request re entire amount billed. Precertification and Prior Authorizations: If your physician has recommended that you have a procedure or additional testing performed.   [facility]) will contact your insurance carrier to obtain pre-certi if you are experiencing any symptoms of infection such as cough, fever, chills, urinary symptoms, or have recently been prescribed antibiotics, have open wounds, have recently had surgery or dental procedures.     As you will be unable to shower for 24 hour HERBAL SUPPLEMENTS  5 days                            Fish oil, krill oil, vitamin E              Insurance Authorization:   Most insurances are now requiring a preauthorization for all procedures.   In the event that your insurance does not authorize

## 2018-10-09 NOTE — TELEPHONE ENCOUNTER
Medical clearance requested from Dr. Iam Rodriguez for patient to hold Eliquis for 3 days for 10/15/18 and 11/8/18 (OV) procedure. Awaiting response. Patient insisted on trying to get clearance for 10/15/18 date.

## 2018-10-09 NOTE — H&P
Name: Hamilton Vera   : 1934   DOS: 10/9/2018     Chief complaint: Low back and right scapular pain    History of present illness:  Hamilton Vera is a 80year old female with a history of bilateral total hip arthroplasty who presents toda Oral Tab Take 200 mg by mouth every 6 (six) hours as needed for Pain. Disp:  Rfl:    apixaban 5 MG Oral Tab Take 0.5 tablets (2.5 mg total) by mouth 2 (two) times daily. Disp: 28 tablet Rfl: 0   Vitamin B-12 500 MCG Oral Tab Take 500 mcg by mouth daily.  Brooks Glover breast   • Breast Cancer Maternal Aunt 72   • Cancer Paternal Aunt         breast   • Breast Cancer Paternal Aunt 72   • Cancer Maternal Uncle         prostate   • Cancer Maternal Uncle         eye   • Cancer Maternal Uncle         lung     Social History 77-year-old female with a history of low back and right scapular pain. The patient began experiencing low back pain following her most recent total hip arthroplasty in June 2018. She does endorse some change in her gait following her hip replacement.   Ce

## 2018-10-10 NOTE — TELEPHONE ENCOUNTER
Spoke with pt to inform that Dr. Divine Gilmore has approved clearance to hold Eliquis for three days prior to each procedure. Pt verbalized understanding and had no additional needs. Copy of Medical Clearance letter sent to scan.

## 2018-10-10 NOTE — TELEPHONE ENCOUNTER
Response received to Medical Clearance Letter from Dr Karan Jain for upcoming Dr Maxine Mclean procedures, endorsed to Pain Nurse Medical Clearance folder in [phone]

## 2018-10-15 ENCOUNTER — APPOINTMENT (OUTPATIENT)
Dept: GENERAL RADIOLOGY | Facility: HOSPITAL | Age: 83
End: 2018-10-15
Attending: ANESTHESIOLOGY
Payer: MEDICARE

## 2018-10-15 ENCOUNTER — HOSPITAL ENCOUNTER (OUTPATIENT)
Facility: HOSPITAL | Age: 83
Setting detail: HOSPITAL OUTPATIENT SURGERY
Discharge: HOME | End: 2018-10-15
Attending: ANESTHESIOLOGY | Admitting: ANESTHESIOLOGY
Payer: MEDICARE

## 2018-10-15 VITALS
RESPIRATION RATE: 18 BRPM | OXYGEN SATURATION: 95 % | HEART RATE: 60 BPM | TEMPERATURE: 97 F | SYSTOLIC BLOOD PRESSURE: 128 MMHG | DIASTOLIC BLOOD PRESSURE: 67 MMHG

## 2018-10-15 DIAGNOSIS — M47.816 SPONDYLOSIS OF LUMBAR REGION WITHOUT MYELOPATHY OR RADICULOPATHY: ICD-10-CM

## 2018-10-15 DIAGNOSIS — M89.8X1 CHRONIC SCAPULAR PAIN: ICD-10-CM

## 2018-10-15 DIAGNOSIS — G89.29 CHRONIC SCAPULAR PAIN: ICD-10-CM

## 2018-10-15 PROCEDURE — 3E0U33Z INTRODUCTION OF ANTI-INFLAMMATORY INTO JOINTS, PERCUTANEOUS APPROACH: ICD-10-PCS | Performed by: ANESTHESIOLOGY

## 2018-10-15 PROCEDURE — BR161ZZ FLUOROSCOPY OF LUMBAR FACET JOINT(S) USING LOW OSMOLAR CONTRAST: ICD-10-PCS | Performed by: ANESTHESIOLOGY

## 2018-10-15 PROCEDURE — 3E0U3BZ INTRODUCTION OF ANESTHETIC AGENT INTO JOINTS, PERCUTANEOUS APPROACH: ICD-10-PCS | Performed by: ANESTHESIOLOGY

## 2018-10-15 RX ORDER — DIPHENHYDRAMINE HYDROCHLORIDE 50 MG/ML
50 INJECTION INTRAMUSCULAR; INTRAVENOUS ONCE AS NEEDED
Status: DISCONTINUED | OUTPATIENT
Start: 2018-10-15 | End: 2018-10-15

## 2018-10-15 RX ORDER — ONDANSETRON 2 MG/ML
4 INJECTION INTRAMUSCULAR; INTRAVENOUS ONCE AS NEEDED
Status: DISCONTINUED | OUTPATIENT
Start: 2018-10-15 | End: 2018-10-15 | Stop reason: HOSPADM

## 2018-10-15 RX ORDER — LIDOCAINE HYDROCHLORIDE 10 MG/ML
INJECTION, SOLUTION EPIDURAL; INFILTRATION; INTRACAUDAL; PERINEURAL AS NEEDED
Status: DISCONTINUED | OUTPATIENT
Start: 2018-10-15 | End: 2018-10-15 | Stop reason: HOSPADM

## 2018-10-15 RX ORDER — METHYLPREDNISOLONE ACETATE 40 MG/ML
INJECTION, SUSPENSION INTRA-ARTICULAR; INTRALESIONAL; INTRAMUSCULAR; SOFT TISSUE AS NEEDED
Status: DISCONTINUED | OUTPATIENT
Start: 2018-10-15 | End: 2018-10-15 | Stop reason: HOSPADM

## 2018-10-15 RX ORDER — SODIUM CHLORIDE, SODIUM LACTATE, POTASSIUM CHLORIDE, CALCIUM CHLORIDE 600; 310; 30; 20 MG/100ML; MG/100ML; MG/100ML; MG/100ML
100 INJECTION, SOLUTION INTRAVENOUS CONTINUOUS
Status: DISCONTINUED | OUTPATIENT
Start: 2018-10-15 | End: 2018-10-15

## 2018-10-15 RX ORDER — BUPIVACAINE HYDROCHLORIDE 5 MG/ML
INJECTION, SOLUTION EPIDURAL; INTRACAUDAL AS NEEDED
Status: DISCONTINUED | OUTPATIENT
Start: 2018-10-15 | End: 2018-10-15 | Stop reason: HOSPADM

## 2018-10-15 RX ORDER — ONDANSETRON 2 MG/ML
4 INJECTION INTRAMUSCULAR; INTRAVENOUS ONCE AS NEEDED
Status: DISCONTINUED | OUTPATIENT
Start: 2018-10-15 | End: 2018-10-15

## 2018-10-15 NOTE — OPERATIVE REPORT
[facility]  Operative Report  10/15/2018     [address] Patient Status:  Hospital Outpatient Surgery    1934 MRN [id number]   [facility] SURGERY Attending Holly Milian MD   Hosp Day # 0 PCP Zoila Jennings MD     Indicat procedure very well. The patient was observed until discharge criteria met. Discharge instructions were given and patient was released to a responsible adult. Complications: None. Follow up:  The patient was followed in the pain clinic as needed ba

## 2018-10-15 NOTE — H&P
History & Physical Examination    Patient Name: Iam Molina  MRN: [id number]  CSN: [id number]  YOB: 1934    Pre-Operative Diagnosis:  Spondylosis of lumbar region without myelopathy or radiculopathy [M47.816]  Chronic scapular pain [M89 Diagnosis Date   • Anal spasm    • Arrhythmia 12/2017    atrial fibrillation   • BLOOD CLOTS    • Dermatophytosis of nail    • Dizziness and giddiness    • HBP (high blood pressure)    • High blood cholesterol    • HIGH BLOOD PRESSURE    • HIGH CHOLESTER [x ]    ABDOMEN [x ] [x ]    UROGENITAL [x ] [x ]    EXTREMITIES [x ] [x ]    OTHER        [ x ] I have discussed the risks and benefits and alternatives with the patient/family. They understand and agree to proceed with plan of care.   [ x ] I have review

## 2018-11-28 ENCOUNTER — PRIOR ORIGINAL RECORDS (OUTPATIENT)
Dept: OTHER | Age: 83
End: 2018-11-28

## 2018-11-28 ENCOUNTER — MYAURORA ACCOUNT LINK (OUTPATIENT)
Dept: OTHER | Age: 83
End: 2018-11-28

## 2018-11-28 PROBLEM — M47.816 LUMBAR SPONDYLOSIS: Status: ACTIVE | Noted: 2018-11-28

## 2018-11-29 ENCOUNTER — HOSPITAL ENCOUNTER (OUTPATIENT)
Dept: MAMMOGRAPHY | Facility: HOSPITAL | Age: 83
Discharge: HOME | End: 2018-11-29
Attending: FAMILY MEDICINE
Payer: MEDICARE

## 2018-11-29 DIAGNOSIS — Z12.39 BREAST CANCER SCREENING: ICD-10-CM

## 2018-11-29 PROCEDURE — 77063 BREAST TOMOSYNTHESIS BI: CPT | Performed by: FAMILY MEDICINE

## 2018-11-29 PROCEDURE — 77067 SCR MAMMO BI INCL CAD: CPT | Performed by: FAMILY MEDICINE

## 2019-02-28 VITALS
HEIGHT: 71 IN | HEART RATE: 60 BPM | WEIGHT: 220 LBS | BODY MASS INDEX: 30.8 KG/M2 | DIASTOLIC BLOOD PRESSURE: 60 MMHG | SYSTOLIC BLOOD PRESSURE: 122 MMHG

## 2019-02-28 VITALS
HEIGHT: 71 IN | WEIGHT: 225 LBS | SYSTOLIC BLOOD PRESSURE: 120 MMHG | DIASTOLIC BLOOD PRESSURE: 64 MMHG | RESPIRATION RATE: 16 BRPM | BODY MASS INDEX: 31.5 KG/M2 | HEART RATE: 66 BPM

## 2019-02-28 VITALS
HEIGHT: 71 IN | WEIGHT: 227 LBS | BODY MASS INDEX: 31.78 KG/M2 | HEART RATE: 60 BPM | SYSTOLIC BLOOD PRESSURE: 130 MMHG | DIASTOLIC BLOOD PRESSURE: 74 MMHG

## 2019-02-28 VITALS — HEIGHT: 71 IN | SYSTOLIC BLOOD PRESSURE: 132 MMHG | DIASTOLIC BLOOD PRESSURE: 76 MMHG | HEART RATE: 74 BPM

## 2019-02-28 VITALS
BODY MASS INDEX: 31.92 KG/M2 | SYSTOLIC BLOOD PRESSURE: 122 MMHG | WEIGHT: 228 LBS | DIASTOLIC BLOOD PRESSURE: 62 MMHG | HEIGHT: 71 IN

## 2019-02-28 VITALS
RESPIRATION RATE: 16 BRPM | WEIGHT: 230 LBS | SYSTOLIC BLOOD PRESSURE: 146 MMHG | HEART RATE: 68 BPM | DIASTOLIC BLOOD PRESSURE: 80 MMHG | BODY MASS INDEX: 32.2 KG/M2 | HEIGHT: 71 IN

## 2019-02-28 VITALS
WEIGHT: 228 LBS | DIASTOLIC BLOOD PRESSURE: 70 MMHG | HEIGHT: 71 IN | SYSTOLIC BLOOD PRESSURE: 126 MMHG | HEART RATE: 66 BPM | BODY MASS INDEX: 31.92 KG/M2

## 2019-03-01 VITALS
DIASTOLIC BLOOD PRESSURE: 66 MMHG | HEIGHT: 71 IN | BODY MASS INDEX: 32.2 KG/M2 | WEIGHT: 230 LBS | SYSTOLIC BLOOD PRESSURE: 138 MMHG | HEART RATE: 70 BPM

## 2019-03-14 RX ORDER — TORSEMIDE 20 MG/1
TABLET ORAL
COMMUNITY
Start: 2017-07-13

## 2019-03-14 RX ORDER — TRAMADOL HYDROCHLORIDE 50 MG/1
TABLET ORAL
COMMUNITY
End: 2019-08-16 | Stop reason: ALTCHOICE

## 2019-03-14 RX ORDER — POTASSIUM CHLORIDE 20 MEQ/1
TABLET, EXTENDED RELEASE ORAL
COMMUNITY
Start: 2017-02-09

## 2019-03-14 RX ORDER — SIMVASTATIN 20 MG
1 TABLET ORAL AT BEDTIME
COMMUNITY
Start: 2017-01-05

## 2019-03-14 RX ORDER — AMIODARONE HYDROCHLORIDE 200 MG/1
TABLET ORAL
COMMUNITY
End: 2020-03-25 | Stop reason: SDUPTHER

## 2019-03-14 RX ORDER — METOPROLOL SUCCINATE 25 MG/1
1 TABLET, EXTENDED RELEASE ORAL DAILY
COMMUNITY
Start: 2017-01-05

## 2019-03-18 RX ORDER — IBUPROFEN 200 MG
CAPSULE ORAL
COMMUNITY
End: 2019-12-01 | Stop reason: SDUPTHER

## 2019-04-04 ENCOUNTER — APPOINTMENT (OUTPATIENT)
Dept: CARDIOLOGY | Age: 84
End: 2019-04-04

## 2019-05-21 ENCOUNTER — TELEPHONE (OUTPATIENT)
Dept: CARDIOLOGY | Age: 84
End: 2019-05-21

## 2019-05-23 PROBLEM — G89.29 CHRONIC BILATERAL LOW BACK PAIN WITHOUT SCIATICA: Status: ACTIVE | Noted: 2019-05-23

## 2019-05-23 PROBLEM — M54.50 CHRONIC BILATERAL LOW BACK PAIN WITHOUT SCIATICA: Status: ACTIVE | Noted: 2019-05-23

## 2019-08-16 ENCOUNTER — OFFICE VISIT (OUTPATIENT)
Dept: CARDIOLOGY | Age: 84
End: 2019-08-16

## 2019-08-16 ENCOUNTER — TELEPHONE (OUTPATIENT)
Dept: CARDIOLOGY | Age: 84
End: 2019-08-16

## 2019-08-16 VITALS
BODY MASS INDEX: 31.5 KG/M2 | OXYGEN SATURATION: 95 % | WEIGHT: 225 LBS | SYSTOLIC BLOOD PRESSURE: 116 MMHG | DIASTOLIC BLOOD PRESSURE: 58 MMHG | HEART RATE: 66 BPM | HEIGHT: 71 IN

## 2019-08-16 DIAGNOSIS — I87.2 VENOUS INSUFFICIENCY (CHRONIC) (PERIPHERAL): Primary | ICD-10-CM

## 2019-08-16 DIAGNOSIS — I83.813 VARICOSE VEINS OF BOTH LOWER EXTREMITIES WITH PAIN: ICD-10-CM

## 2019-08-16 DIAGNOSIS — Z78.9 NONSMOKER: ICD-10-CM

## 2019-08-16 DIAGNOSIS — I10 ESSENTIAL HYPERTENSION: ICD-10-CM

## 2019-08-16 DIAGNOSIS — Z79.01 CHRONIC ANTICOAGULATION: ICD-10-CM

## 2019-08-16 DIAGNOSIS — Z86.79 HISTORY OF ATRIAL FIBRILLATION: ICD-10-CM

## 2019-08-16 DIAGNOSIS — E78.00 PURE HYPERCHOLESTEROLEMIA: ICD-10-CM

## 2019-08-16 PROCEDURE — 93000 ELECTROCARDIOGRAM COMPLETE: CPT | Performed by: INTERNAL MEDICINE

## 2019-08-16 PROCEDURE — 99205 OFFICE O/P NEW HI 60 MIN: CPT | Performed by: INTERNAL MEDICINE

## 2019-08-16 RX ORDER — OMEPRAZOLE 20 MG/1
CAPSULE, DELAYED RELEASE ORAL DAILY
COMMUNITY
Start: 2018-08-14 | End: 2020-10-23 | Stop reason: ALTCHOICE

## 2019-08-16 RX ORDER — TIZANIDINE 2 MG/1
2 TABLET ORAL EVERY 6 HOURS PRN
COMMUNITY
End: 2020-10-01 | Stop reason: ALTCHOICE

## 2019-08-16 ASSESSMENT — ENCOUNTER SYMPTOMS
GASTROINTESTINAL NEGATIVE: 1
ENDOCRINE NEGATIVE: 1
PSYCHIATRIC NEGATIVE: 1
EYES NEGATIVE: 1
HEMATOLOGIC/LYMPHATIC NEGATIVE: 1
ALLERGIC/IMMUNOLOGIC NEGATIVE: 1
CONSTITUTIONAL NEGATIVE: 1
NEUROLOGICAL NEGATIVE: 1
RESPIRATORY NEGATIVE: 1

## 2019-08-20 ENCOUNTER — APPOINTMENT (OUTPATIENT)
Dept: CARDIOLOGY | Age: 84
End: 2019-08-20
Attending: INTERNAL MEDICINE

## 2019-08-22 ENCOUNTER — OFFICE VISIT (OUTPATIENT)
Dept: PODIATRY CLINIC | Facility: CLINIC | Age: 84
End: 2019-08-22
Payer: MEDICARE

## 2019-08-22 DIAGNOSIS — L97.501 ULCER OF FOOT, LIMITED TO BREAKDOWN OF SKIN, UNSPECIFIED LATERALITY (HCC): Primary | ICD-10-CM

## 2019-08-22 DIAGNOSIS — M20.41 HAMMER TOES OF BOTH FEET: ICD-10-CM

## 2019-08-22 DIAGNOSIS — B35.1 ONYCHOMYCOSIS: ICD-10-CM

## 2019-08-22 DIAGNOSIS — M20.42 HAMMER TOES OF BOTH FEET: ICD-10-CM

## 2019-08-22 PROCEDURE — 99213 OFFICE O/P EST LOW 20 MIN: CPT | Performed by: PODIATRIST

## 2019-08-22 NOTE — PROGRESS NOTES
Suresh Nicholson is a 80year old female. Patient presents with:  Toenail Care: Nail and callus trimming- 4th toe right foot sore cause of the thick and long nail.         HPI:     She presents today for evaluation bothered by these lesions fourth toe bot HIGH CHOLESTEROL    • Muscle weakness    • Osteoarthrosis, unspecified whether generalized or localized, unspecified site    • Other screening mammogram 06/03/10   • Special screening for osteoporosis 06/04/03        Past Surgical History:   Procedure Late Caffeine Concern: Yes          OCC        Exercise: Yes          walks occ, PT exercises at  home          REVIEW OF SYSTEMS:   Review of Systems    Today reviewed systems as documented below  GENERAL HEALTH: feels well otherwise  SKIN: denies any unusual causing impaction, and pain with ambulation. Today, those nails were debrided back to as healthy tissue as normal, being reduced by both manual and mechanical means. The patient indicates understanding of these issues and agrees to the plan.     Return i

## 2019-09-18 ENCOUNTER — APPOINTMENT (OUTPATIENT)
Dept: CARDIOLOGY | Age: 84
End: 2019-09-18
Attending: INTERNAL MEDICINE

## 2019-12-02 ENCOUNTER — HOSPITAL ENCOUNTER (OUTPATIENT)
Dept: MAMMOGRAPHY | Facility: HOSPITAL | Age: 84
Discharge: HOME | End: 2019-12-02
Attending: FAMILY MEDICINE
Payer: MEDICARE

## 2019-12-02 DIAGNOSIS — Z12.31 ENCOUNTER FOR SCREENING MAMMOGRAM FOR MALIGNANT NEOPLASM OF BREAST: ICD-10-CM

## 2019-12-02 PROCEDURE — 77063 BREAST TOMOSYNTHESIS BI: CPT | Performed by: FAMILY MEDICINE

## 2019-12-02 PROCEDURE — 77067 SCR MAMMO BI INCL CAD: CPT | Performed by: FAMILY MEDICINE

## 2019-12-12 ENCOUNTER — OFFICE VISIT (OUTPATIENT)
Dept: PODIATRY CLINIC | Facility: CLINIC | Age: 84
End: 2019-12-12
Payer: MEDICARE

## 2019-12-12 DIAGNOSIS — L97.501 ULCER OF FOOT, LIMITED TO BREAKDOWN OF SKIN, UNSPECIFIED LATERALITY (HCC): Primary | ICD-10-CM

## 2019-12-12 DIAGNOSIS — M20.42 HAMMER TOES OF BOTH FEET: ICD-10-CM

## 2019-12-12 DIAGNOSIS — B35.1 ONYCHOMYCOSIS: ICD-10-CM

## 2019-12-12 DIAGNOSIS — M20.41 HAMMER TOES OF BOTH FEET: ICD-10-CM

## 2019-12-12 PROCEDURE — 99213 OFFICE O/P EST LOW 20 MIN: CPT | Performed by: PODIATRIST

## 2019-12-12 NOTE — PROGRESS NOTES
Gaye Mckeon is a 80year old female. Patient presents with:  Toenail Care: 80year old female unable to trim her own nails.          HPI:     Patient presents today for evaluation she is here for treatment of her hammertoes with associated ulceration [facility] ENDOSCOPY   • HIP REPLACEMENT SURGERY     • HIP TOTAL REPLACEMENT Left 6/13/2018    Performed by Chelsea Abraham MD at [facility] MAIN OR   • HIP TOTAL REPLACEMENT Right 11/13/2013    Performed by Chelsea Abraham MD at [facility] MAIN OR   • [address] DIV&STRIPPING SHORT exertion  CARDIOVASCULAR: denies chest pain on exertion  GI: denies abdominal pain and denies heartburn  NEURO: denies headaches  MUSCULO: Acknowledges arthritis, back pain      EXAM:   There were no vitals taken for this visit.   Physical Exam  GENERAL: we the plan. Return in about 3 months (around 3/12/2020).     Yadiel Kendrick DPM  12/12/2019

## 2019-12-18 ENCOUNTER — OFFICE VISIT (OUTPATIENT)
Dept: CARDIOLOGY | Age: 84
End: 2019-12-18

## 2019-12-18 VITALS
WEIGHT: 228 LBS | BODY MASS INDEX: 31.92 KG/M2 | DIASTOLIC BLOOD PRESSURE: 54 MMHG | HEIGHT: 71 IN | SYSTOLIC BLOOD PRESSURE: 118 MMHG | HEART RATE: 58 BPM

## 2019-12-18 DIAGNOSIS — E78.00 PURE HYPERCHOLESTEROLEMIA: ICD-10-CM

## 2019-12-18 DIAGNOSIS — I87.2 VENOUS INSUFFICIENCY: ICD-10-CM

## 2019-12-18 DIAGNOSIS — Z51.81 ENCOUNTER FOR MONITORING AMIODARONE THERAPY: ICD-10-CM

## 2019-12-18 DIAGNOSIS — I48.0 PAROXYSMAL ATRIAL FIBRILLATION (CMD): Primary | ICD-10-CM

## 2019-12-18 DIAGNOSIS — Z79.899 ENCOUNTER FOR MONITORING AMIODARONE THERAPY: ICD-10-CM

## 2019-12-18 DIAGNOSIS — Z79.01 CHRONIC ANTICOAGULATION: ICD-10-CM

## 2019-12-18 PROCEDURE — 93000 ELECTROCARDIOGRAM COMPLETE: CPT | Performed by: NURSE PRACTITIONER

## 2019-12-18 PROCEDURE — 99213 OFFICE O/P EST LOW 20 MIN: CPT | Performed by: NURSE PRACTITIONER

## 2019-12-18 SDOH — HEALTH STABILITY: PHYSICAL HEALTH: ON AVERAGE, HOW MANY DAYS PER WEEK DO YOU ENGAGE IN MODERATE TO STRENUOUS EXERCISE (LIKE A BRISK WALK)?: 0 DAYS

## 2019-12-18 SDOH — HEALTH STABILITY: PHYSICAL HEALTH: ON AVERAGE, HOW MANY MINUTES DO YOU ENGAGE IN EXERCISE AT THIS LEVEL?: 0 MIN

## 2019-12-18 ASSESSMENT — PATIENT HEALTH QUESTIONNAIRE - PHQ9
SUM OF ALL RESPONSES TO PHQ9 QUESTIONS 1 AND 2: 0
1. LITTLE INTEREST OR PLEASURE IN DOING THINGS: NOT AT ALL
SUM OF ALL RESPONSES TO PHQ9 QUESTIONS 1 AND 2: 0
2. FEELING DOWN, DEPRESSED OR HOPELESS: NOT AT ALL

## 2019-12-18 ASSESSMENT — ENCOUNTER SYMPTOMS
GASTROINTESTINAL NEGATIVE: 1
SHORTNESS OF BREATH: 0
SYNCOPE: 0
NEUROLOGICAL NEGATIVE: 1
WEIGHT LOSS: 0
WEIGHT GAIN: 0
DECREASED APPETITE: 0
DIAPHORESIS: 0

## 2020-03-25 ENCOUNTER — TELEPHONE (OUTPATIENT)
Dept: CARDIOLOGY | Age: 85
End: 2020-03-25

## 2020-03-25 RX ORDER — AMIODARONE HYDROCHLORIDE 200 MG/1
200 TABLET ORAL DAILY
Qty: 14 TABLET | Refills: 0 | Status: SHIPPED | OUTPATIENT
Start: 2020-03-25 | End: 2020-08-17 | Stop reason: SDUPTHER

## 2020-04-10 ENCOUNTER — OFFICE VISIT (OUTPATIENT)
Dept: CARDIOLOGY | Age: 85
End: 2020-04-10

## 2020-04-10 DIAGNOSIS — I10 ESSENTIAL HYPERTENSION: Primary | ICD-10-CM

## 2020-04-10 DIAGNOSIS — I48.0 PAROXYSMAL ATRIAL FIBRILLATION (CMD): ICD-10-CM

## 2020-04-10 PROCEDURE — 99441 TELEPHONE E&M BY PHYSICIAN EST PT NOT ORIG PREV 7 DAYS 5-10 MIN: CPT | Performed by: INTERNAL MEDICINE

## 2020-06-15 ENCOUNTER — OFFICE VISIT (OUTPATIENT)
Dept: PODIATRY CLINIC | Facility: CLINIC | Age: 85
End: 2020-06-15
Payer: MEDICARE

## 2020-06-15 DIAGNOSIS — M20.41 HAMMER TOES OF BOTH FEET: ICD-10-CM

## 2020-06-15 DIAGNOSIS — M20.42 HAMMER TOES OF BOTH FEET: ICD-10-CM

## 2020-06-15 DIAGNOSIS — B35.1 ONYCHOMYCOSIS: ICD-10-CM

## 2020-06-15 DIAGNOSIS — L97.501 ULCER OF FOOT, LIMITED TO BREAKDOWN OF SKIN, UNSPECIFIED LATERALITY (HCC): Primary | ICD-10-CM

## 2020-06-15 PROCEDURE — 99213 OFFICE O/P EST LOW 20 MIN: CPT | Performed by: PODIATRIST

## 2020-06-15 NOTE — PROGRESS NOTES
Keyla Whalen is a 80year old female. Patient presents with:  Toenail Care: Patient is here for routine nail care. 80year old female unable to trim her own nails. Patient denies any pain or swelling of the feet.         HPI:     Patient presents toda 06/04/03      Past Surgical History:   Procedure Laterality Date   • GASTROSCOPY N/A 6/28/2013    Performed by Breanna Jacinto MD at [address] [facility]     • HIP TOTAL REPLACEMENT Left 6/13/2018    Performed by Gary Luke MD at feels well otherwise  SKIN: denies any unusual skin lesions or rashes  RESPIRATORY: denies shortness of breath with exertion  CARDIOVASCULAR: denies chest pain on exertion  GI: denies abdominal pain and denies heartburn  NEURO: denies headaches  MUSCULO: P agrees to the plan. Return in about 3 months (around 9/15/2020).     Dorys Hong DPM  6/15/2020

## 2020-08-12 ENCOUNTER — LAB ENCOUNTER (OUTPATIENT)
Dept: LAB | Facility: HOSPITAL | Age: 85
End: 2020-08-12
Attending: FAMILY MEDICINE
Payer: MEDICARE

## 2020-08-12 DIAGNOSIS — I10 ESSENTIAL HYPERTENSION, MALIGNANT: ICD-10-CM

## 2020-08-12 DIAGNOSIS — Z79.899 ENCOUNTER FOR LONG-TERM (CURRENT) USE OF OTHER MEDICATIONS: ICD-10-CM

## 2020-08-12 DIAGNOSIS — R09.89 ABNORMAL CHEST SOUNDS: ICD-10-CM

## 2020-08-12 DIAGNOSIS — D64.9 ANEMIA, UNSPECIFIED: ICD-10-CM

## 2020-08-12 DIAGNOSIS — I48.91 ATRIAL FIBRILLATION (HCC): ICD-10-CM

## 2020-08-12 DIAGNOSIS — Z79.01 LONG TERM (CURRENT) USE OF ANTICOAGULANTS: ICD-10-CM

## 2020-08-12 DIAGNOSIS — I48.0 PAROXYSMAL ATRIAL FIBRILLATION (HCC): Primary | ICD-10-CM

## 2020-08-12 LAB
ALBUMIN SERPL-MCNC: 3.9 G/DL (ref 3.4–5)
ALBUMIN/GLOB SERPL: 1.3 {RATIO} (ref 1–2)
ALP LIVER SERPL-CCNC: 67 U/L (ref 55–142)
ALT SERPL-CCNC: 20 U/L (ref 13–56)
ANION GAP SERPL CALC-SCNC: 2 MMOL/L (ref 0–18)
AST SERPL-CCNC: 14 U/L (ref 15–37)
BASOPHILS # BLD AUTO: 0.02 X10(3) UL (ref 0–0.2)
BASOPHILS NFR BLD AUTO: 0.5 %
BILIRUB SERPL-MCNC: 0.9 MG/DL (ref 0.1–2)
BUN BLD-MCNC: 20 MG/DL (ref 7–18)
BUN/CREAT SERPL: 14.7 (ref 10–20)
CALCIUM BLD-MCNC: 9.1 MG/DL (ref 8.5–10.1)
CHLORIDE SERPL-SCNC: 108 MMOL/L (ref 98–112)
CHOLEST SMN-MCNC: 164 MG/DL (ref ?–200)
CO2 SERPL-SCNC: 31 MMOL/L (ref 21–32)
CREAT BLD-MCNC: 1.36 MG/DL (ref 0.55–1.02)
DEPRECATED RDW RBC AUTO: 51.8 FL (ref 35.1–46.3)
EOSINOPHIL # BLD AUTO: 0.15 X10(3) UL (ref 0–0.7)
EOSINOPHIL NFR BLD AUTO: 3.4 %
ERYTHROCYTE [DISTWIDTH] IN BLOOD BY AUTOMATED COUNT: 14.2 % (ref 11–15)
GLOBULIN PLAS-MCNC: 2.9 G/DL (ref 2.8–4.4)
GLUCOSE BLD-MCNC: 87 MG/DL (ref 70–99)
HAV IGM SER QL: 2.6 MG/DL (ref 1.6–2.6)
HCT VFR BLD AUTO: 36.2 % (ref 35–48)
HDLC SERPL-MCNC: 51 MG/DL (ref 40–59)
HGB BLD-MCNC: 11 G/DL (ref 12–16)
IMM GRANULOCYTES # BLD AUTO: 0.02 X10(3) UL (ref 0–1)
IMM GRANULOCYTES NFR BLD: 0.5 %
LDLC SERPL CALC-MCNC: 82 MG/DL (ref ?–100)
LYMPHOCYTES # BLD AUTO: 0.89 X10(3) UL (ref 1–4)
LYMPHOCYTES NFR BLD AUTO: 20.1 %
M PROTEIN MFR SERPL ELPH: 6.8 G/DL (ref 6.4–8.2)
MCH RBC QN AUTO: 30.5 PG (ref 26–34)
MCHC RBC AUTO-ENTMCNC: 30.4 G/DL (ref 31–37)
MCV RBC AUTO: 100.3 FL (ref 80–100)
MONOCYTES # BLD AUTO: 0.4 X10(3) UL (ref 0.1–1)
MONOCYTES NFR BLD AUTO: 9 %
NEUTROPHILS # BLD AUTO: 2.94 X10 (3) UL (ref 1.5–7.7)
NEUTROPHILS # BLD AUTO: 2.94 X10(3) UL (ref 1.5–7.7)
NEUTROPHILS NFR BLD AUTO: 66.5 %
NONHDLC SERPL-MCNC: 113 MG/DL (ref ?–130)
OSMOLALITY SERPL CALC.SUM OF ELEC: 294 MOSM/KG (ref 275–295)
PATIENT FASTING Y/N/NP: YES
PATIENT FASTING Y/N/NP: YES
PLATELET # BLD AUTO: 144 10(3)UL (ref 150–450)
POTASSIUM SERPL-SCNC: 4.4 MMOL/L (ref 3.5–5.1)
RBC # BLD AUTO: 3.61 X10(6)UL (ref 3.8–5.3)
SODIUM SERPL-SCNC: 141 MMOL/L (ref 136–145)
TRIGL SERPL-MCNC: 153 MG/DL (ref 30–149)
TSI SER-ACNC: 6.19 MIU/ML (ref 0.36–3.74)
VLDLC SERPL CALC-MCNC: 31 MG/DL (ref 0–30)
WBC # BLD AUTO: 4.4 X10(3) UL (ref 4–11)

## 2020-08-12 PROCEDURE — 83735 ASSAY OF MAGNESIUM: CPT

## 2020-08-12 PROCEDURE — 80061 LIPID PANEL: CPT

## 2020-08-12 PROCEDURE — 80053 COMPREHEN METABOLIC PANEL: CPT

## 2020-08-12 PROCEDURE — 84443 ASSAY THYROID STIM HORMONE: CPT

## 2020-08-12 PROCEDURE — 36415 COLL VENOUS BLD VENIPUNCTURE: CPT

## 2020-08-12 PROCEDURE — 85025 COMPLETE CBC W/AUTO DIFF WBC: CPT

## 2020-08-13 ENCOUNTER — TELEPHONE (OUTPATIENT)
Dept: CARDIOLOGY | Age: 85
End: 2020-08-13

## 2020-08-17 RX ORDER — AMIODARONE HYDROCHLORIDE 200 MG/1
100 TABLET ORAL DAILY
Qty: 60 TABLET | Refills: 0 | Status: SHIPPED | OUTPATIENT
Start: 2020-08-17 | End: 2021-06-16

## 2020-09-10 ENCOUNTER — HOSPITAL ENCOUNTER (EMERGENCY)
Facility: HOSPITAL | Age: 85
Discharge: HOME | End: 2020-09-10
Attending: EMERGENCY MEDICINE
Payer: MEDICARE

## 2020-09-10 VITALS
SYSTOLIC BLOOD PRESSURE: 142 MMHG | WEIGHT: 225 LBS | OXYGEN SATURATION: 97 % | HEART RATE: 62 BPM | RESPIRATION RATE: 15 BRPM | HEIGHT: 71 IN | BODY MASS INDEX: 31.5 KG/M2 | DIASTOLIC BLOOD PRESSURE: 79 MMHG | TEMPERATURE: 98 F

## 2020-09-10 DIAGNOSIS — H11.32 SUBCONJUNCTIVAL HEMORRHAGE OF LEFT EYE: Primary | ICD-10-CM

## 2020-09-10 PROCEDURE — 99283 EMERGENCY DEPT VISIT LOW MDM: CPT

## 2020-09-10 RX ORDER — ERYTHROMYCIN 5 MG/G
1 OINTMENT OPHTHALMIC EVERY 6 HOURS
Qty: 1 G | Refills: 0 | Status: SHIPPED | OUTPATIENT
Start: 2020-09-10 | End: 2020-09-17

## 2020-09-10 RX ORDER — LOSARTAN POTASSIUM 25 MG/1
TABLET ORAL DAILY
COMMUNITY

## 2020-09-10 NOTE — ED INITIAL ASSESSMENT (HPI)
Pt complains of redness to left eye after feeling debris get into her eye after shaking a rug. Pt reports she is still able to read, vision does not appear to be impacted, denies pain.

## 2020-09-10 NOTE — ED PROVIDER NOTES
Patient Seen in: [facility] Emergency Department      History   Patient presents with:   Eye Visual Problem    Stated Complaint: speck got in left eye when shaking out rug. redness observed    HPI    This is a 15-year-old female who presents with lef Systems    Positive for stated complaint: speck got in left eye when shaking out rug. redness observed  Other systems are as noted in HPI. Constitutional and vital signs reviewed. All other systems reviewed and negative except as noted above.     Phys pm    Follow-up:  Mariposa Hawkins MD  [address]  [facility]  [phone]    In 2 days      [facility] [address] [place] [address] [phone]    In 1 day            Medications Prescribed:  Discharge

## 2020-10-15 ENCOUNTER — APPOINTMENT (OUTPATIENT)
Dept: CARDIOLOGY | Age: 85
End: 2020-10-15

## 2020-10-16 ENCOUNTER — APPOINTMENT (OUTPATIENT)
Dept: CARDIOLOGY | Age: 85
End: 2020-10-16

## 2020-10-23 ENCOUNTER — OFFICE VISIT (OUTPATIENT)
Dept: CARDIOLOGY | Age: 85
End: 2020-10-23

## 2020-10-23 VITALS
SYSTOLIC BLOOD PRESSURE: 124 MMHG | WEIGHT: 225 LBS | BODY MASS INDEX: 31.5 KG/M2 | HEART RATE: 66 BPM | DIASTOLIC BLOOD PRESSURE: 82 MMHG | HEIGHT: 71 IN

## 2020-10-23 DIAGNOSIS — I48.0 PAROXYSMAL ATRIAL FIBRILLATION (CMD): Primary | ICD-10-CM

## 2020-10-23 PROCEDURE — 99215 OFFICE O/P EST HI 40 MIN: CPT | Performed by: INTERNAL MEDICINE

## 2020-10-23 RX ORDER — LOSARTAN POTASSIUM 25 MG/1
TABLET ORAL
COMMUNITY
Start: 2020-10-15

## 2020-10-23 SDOH — HEALTH STABILITY: PHYSICAL HEALTH: ON AVERAGE, HOW MANY DAYS PER WEEK DO YOU ENGAGE IN MODERATE TO STRENUOUS EXERCISE (LIKE A BRISK WALK)?: 0 DAYS

## 2020-10-23 SDOH — HEALTH STABILITY: PHYSICAL HEALTH: ON AVERAGE, HOW MANY MINUTES DO YOU ENGAGE IN EXERCISE AT THIS LEVEL?: 0 MIN

## 2020-10-23 ASSESSMENT — PATIENT HEALTH QUESTIONNAIRE - PHQ9
SUM OF ALL RESPONSES TO PHQ9 QUESTIONS 1 AND 2: 0
1. LITTLE INTEREST OR PLEASURE IN DOING THINGS: NOT AT ALL
SUM OF ALL RESPONSES TO PHQ9 QUESTIONS 1 AND 2: 0
CLINICAL INTERPRETATION OF PHQ2 SCORE: NO FURTHER SCREENING NEEDED
CLINICAL INTERPRETATION OF PHQ9 SCORE: NO FURTHER SCREENING NEEDED
2. FEELING DOWN, DEPRESSED OR HOPELESS: NOT AT ALL

## 2020-11-09 ENCOUNTER — LAB ENCOUNTER (OUTPATIENT)
Dept: LAB | Facility: HOSPITAL | Age: 85
End: 2020-11-09
Attending: FAMILY MEDICINE
Payer: MEDICARE

## 2020-11-09 DIAGNOSIS — D64.9 ANEMIA: Primary | ICD-10-CM

## 2020-11-09 DIAGNOSIS — D69.6 THROMBOCYTOPENIA, UNSPECIFIED (HCC): ICD-10-CM

## 2020-11-09 DIAGNOSIS — I48.91 ATRIAL FIBRILLATION (HCC): ICD-10-CM

## 2020-11-09 DIAGNOSIS — Z79.01 LONG TERM (CURRENT) USE OF ANTICOAGULANTS: ICD-10-CM

## 2020-11-09 DIAGNOSIS — I12.9 RENAL HYPERTENSION: ICD-10-CM

## 2020-11-09 DIAGNOSIS — I10 BENIGN HYPERTENSION: ICD-10-CM

## 2020-11-09 DIAGNOSIS — R79.89 ELEVATED TSH: ICD-10-CM

## 2020-11-09 DIAGNOSIS — Z79.899 NEED FOR PROPHYLACTIC CHEMOTHERAPY: ICD-10-CM

## 2020-11-09 PROCEDURE — 83540 ASSAY OF IRON: CPT

## 2020-11-09 PROCEDURE — 82728 ASSAY OF FERRITIN: CPT

## 2020-11-09 PROCEDURE — 84481 FREE ASSAY (FT-3): CPT

## 2020-11-09 PROCEDURE — 82607 VITAMIN B-12: CPT

## 2020-11-09 PROCEDURE — 36415 COLL VENOUS BLD VENIPUNCTURE: CPT

## 2020-11-09 PROCEDURE — 84439 ASSAY OF FREE THYROXINE: CPT

## 2020-11-09 PROCEDURE — 85025 COMPLETE CBC W/AUTO DIFF WBC: CPT

## 2020-11-09 PROCEDURE — 82746 ASSAY OF FOLIC ACID SERUM: CPT

## 2020-11-09 PROCEDURE — 86376 MICROSOMAL ANTIBODY EACH: CPT

## 2020-11-09 PROCEDURE — 83550 IRON BINDING TEST: CPT

## 2020-11-09 PROCEDURE — 84443 ASSAY THYROID STIM HORMONE: CPT

## 2020-11-09 PROCEDURE — 80048 BASIC METABOLIC PNL TOTAL CA: CPT

## 2020-11-09 PROCEDURE — 86800 THYROGLOBULIN ANTIBODY: CPT

## 2020-11-18 ENCOUNTER — TELEPHONE (OUTPATIENT)
Dept: CARDIOLOGY | Age: 85
End: 2020-11-18

## 2020-11-23 ENCOUNTER — ANTI-COAG (OUTPATIENT)
Dept: CARDIOLOGY | Age: 85
End: 2020-11-23

## 2020-11-23 DIAGNOSIS — I48.0 PAROXYSMAL ATRIAL FIBRILLATION (CMD): ICD-10-CM

## 2020-11-23 RX ORDER — WARFARIN SODIUM 5 MG/1
5 TABLET ORAL DAILY
Qty: 90 TABLET | Refills: 1 | Status: SHIPPED | OUTPATIENT
Start: 2020-11-23 | End: 2021-05-28

## 2020-11-25 ENCOUNTER — HOSPITAL ENCOUNTER (OUTPATIENT)
Dept: LAB | Facility: HOSPITAL | Age: 85
Discharge: HOME | End: 2020-11-25
Attending: INTERNAL MEDICINE
Payer: MEDICARE

## 2020-11-25 ENCOUNTER — ANTI-COAG (OUTPATIENT)
Dept: CARDIOLOGY | Age: 85
End: 2020-11-25

## 2020-11-25 DIAGNOSIS — I48.0 PAROXYSMAL ATRIAL FIBRILLATION (CMD): ICD-10-CM

## 2020-11-25 DIAGNOSIS — I48.0 PAROXYSMAL ATRIAL FIBRILLATION (HCC): ICD-10-CM

## 2020-11-25 LAB — INR PPP: 1.3

## 2020-11-25 PROCEDURE — 85610 PROTHROMBIN TIME: CPT

## 2020-11-27 ENCOUNTER — HOSPITAL ENCOUNTER (OUTPATIENT)
Dept: LAB | Facility: HOSPITAL | Age: 85
Discharge: HOME | End: 2020-11-27
Attending: INTERNAL MEDICINE
Payer: MEDICARE

## 2020-11-27 ENCOUNTER — ANTI-COAG (OUTPATIENT)
Dept: CARDIOLOGY | Age: 85
End: 2020-11-27

## 2020-11-27 DIAGNOSIS — I48.0 PAROXYSMAL ATRIAL FIBRILLATION (CMD): ICD-10-CM

## 2020-11-27 DIAGNOSIS — I48.0 PAROXYSMAL ATRIAL FIBRILLATION (HCC): ICD-10-CM

## 2020-11-27 LAB — INR PPP: 1.7

## 2020-11-27 PROCEDURE — 85610 PROTHROMBIN TIME: CPT

## 2020-12-01 ENCOUNTER — OFFICE VISIT (OUTPATIENT)
Dept: PODIATRY CLINIC | Facility: CLINIC | Age: 85
End: 2020-12-01
Payer: MEDICARE

## 2020-12-01 DIAGNOSIS — M20.42 HAMMER TOES OF BOTH FEET: ICD-10-CM

## 2020-12-01 DIAGNOSIS — M20.41 HAMMER TOES OF BOTH FEET: ICD-10-CM

## 2020-12-01 DIAGNOSIS — B35.1 ONYCHOMYCOSIS: ICD-10-CM

## 2020-12-01 DIAGNOSIS — L97.501 ULCER OF FOOT, LIMITED TO BREAKDOWN OF SKIN, UNSPECIFIED LATERALITY (HCC): Primary | ICD-10-CM

## 2020-12-01 PROCEDURE — 99213 OFFICE O/P EST LOW 20 MIN: CPT | Performed by: PODIATRIST

## 2020-12-01 RX ORDER — WARFARIN SODIUM 5 MG/1
5 TABLET ORAL DAILY
COMMUNITY
Start: 2020-11-23

## 2020-12-01 NOTE — PROGRESS NOTES
Jaxon Cunha is a 80year old female. Patient presents with:  Toenail Care: patient unable to trim her own nails.         HPI:     Presents today she is got hammertoes and thick dystrophic nails with distal keratosis and ulceration superficial in natu • HIP REPLACEMENT SURGERY     • HIP TOTAL REPLACEMENT Left 6/13/2018    Performed by Aleida Iqbal MD at [facility] MAIN OR   • HIP TOTAL REPLACEMENT Right 11/13/2013    Performed by Aleida Iqbal MD at [facility] MAIN OR   • [address] DIV&STRIPPING SHORT SAPHENOUS VE denies chest pain on exertion  GI: denies abdominal pain and denies heartburn  NEURO: denies headaches  MUSCULO: Acknowledges arthritis, back pain      EXAM:   There were no vitals taken for this visit.   Physical Exam  GENERAL: well developed, well nourish

## 2020-12-02 ENCOUNTER — ANTI-COAG (OUTPATIENT)
Dept: CARDIOLOGY | Age: 85
End: 2020-12-02

## 2020-12-02 ENCOUNTER — HOSPITAL ENCOUNTER (OUTPATIENT)
Dept: LAB | Facility: HOSPITAL | Age: 85
Discharge: HOME | End: 2020-12-02
Attending: INTERNAL MEDICINE
Payer: MEDICARE

## 2020-12-02 DIAGNOSIS — I48.0 PAROXYSMAL ATRIAL FIBRILLATION (HCC): ICD-10-CM

## 2020-12-02 DIAGNOSIS — I48.0 PAROXYSMAL ATRIAL FIBRILLATION (CMD): ICD-10-CM

## 2020-12-02 LAB — INR PPP: 4.4

## 2020-12-02 PROCEDURE — 85610 PROTHROMBIN TIME: CPT

## 2020-12-04 ENCOUNTER — HOSPITAL ENCOUNTER (OUTPATIENT)
Dept: LAB | Facility: HOSPITAL | Age: 85
Discharge: HOME | End: 2020-12-04
Attending: INTERNAL MEDICINE
Payer: MEDICARE

## 2020-12-04 ENCOUNTER — ANTI-COAG (OUTPATIENT)
Dept: CARDIOLOGY | Age: 85
End: 2020-12-04

## 2020-12-04 DIAGNOSIS — I48.0 PAROXYSMAL ATRIAL FIBRILLATION (HCC): ICD-10-CM

## 2020-12-04 DIAGNOSIS — I48.0 PAROXYSMAL ATRIAL FIBRILLATION (CMD): ICD-10-CM

## 2020-12-04 LAB — INR PPP: 2.6

## 2020-12-04 PROCEDURE — 85610 PROTHROMBIN TIME: CPT

## 2020-12-07 ENCOUNTER — ANTI-COAG (OUTPATIENT)
Dept: CARDIOLOGY | Age: 85
End: 2020-12-07

## 2020-12-07 ENCOUNTER — HOSPITAL ENCOUNTER (OUTPATIENT)
Dept: LAB | Facility: HOSPITAL | Age: 85
Discharge: HOME | End: 2020-12-07
Attending: INTERNAL MEDICINE
Payer: MEDICARE

## 2020-12-07 DIAGNOSIS — I48.0 PAROXYSMAL ATRIAL FIBRILLATION (HCC): ICD-10-CM

## 2020-12-07 DIAGNOSIS — I48.0 PAROXYSMAL ATRIAL FIBRILLATION (CMD): ICD-10-CM

## 2020-12-07 LAB — INR PPP: 3.5

## 2020-12-07 PROCEDURE — 85610 PROTHROMBIN TIME: CPT

## 2020-12-11 ENCOUNTER — ANTI-COAG (OUTPATIENT)
Dept: CARDIOLOGY | Age: 85
End: 2020-12-11

## 2020-12-11 ENCOUNTER — HOSPITAL ENCOUNTER (OUTPATIENT)
Dept: LAB | Facility: HOSPITAL | Age: 85
Discharge: HOME | End: 2020-12-11
Attending: INTERNAL MEDICINE
Payer: MEDICARE

## 2020-12-11 DIAGNOSIS — I48.0 PAROXYSMAL ATRIAL FIBRILLATION (CMD): ICD-10-CM

## 2020-12-11 DIAGNOSIS — I48.0 PAROXYSMAL ATRIAL FIBRILLATION (HCC): ICD-10-CM

## 2020-12-11 LAB — INR PPP: 2.9

## 2020-12-11 PROCEDURE — 85610 PROTHROMBIN TIME: CPT

## 2020-12-18 ENCOUNTER — HOSPITAL ENCOUNTER (OUTPATIENT)
Dept: LAB | Facility: HOSPITAL | Age: 85
Discharge: HOME | End: 2020-12-18
Attending: INTERNAL MEDICINE
Payer: MEDICARE

## 2020-12-18 ENCOUNTER — ANTI-COAG (OUTPATIENT)
Dept: CARDIOLOGY | Age: 85
End: 2020-12-18

## 2020-12-18 DIAGNOSIS — I48.0 PAROXYSMAL ATRIAL FIBRILLATION (CMD): ICD-10-CM

## 2020-12-18 DIAGNOSIS — I48.0 PAROXYSMAL ATRIAL FIBRILLATION (HCC): ICD-10-CM

## 2020-12-18 LAB — INR PPP: 3.4

## 2020-12-18 PROCEDURE — 85610 PROTHROMBIN TIME: CPT

## 2020-12-23 ENCOUNTER — ANTI-COAG (OUTPATIENT)
Dept: CARDIOLOGY | Age: 85
End: 2020-12-23

## 2020-12-23 ENCOUNTER — HOSPITAL ENCOUNTER (OUTPATIENT)
Dept: LAB | Facility: HOSPITAL | Age: 85
Discharge: HOME | End: 2020-12-23
Attending: INTERNAL MEDICINE
Payer: MEDICARE

## 2020-12-23 DIAGNOSIS — I48.0 PAROXYSMAL ATRIAL FIBRILLATION (HCC): ICD-10-CM

## 2020-12-23 DIAGNOSIS — I48.0 PAROXYSMAL ATRIAL FIBRILLATION (CMD): ICD-10-CM

## 2020-12-23 LAB — INR PPP: 4

## 2020-12-23 PROCEDURE — 85610 PROTHROMBIN TIME: CPT

## 2020-12-28 ENCOUNTER — ANTI-COAG (OUTPATIENT)
Dept: CARDIOLOGY | Age: 85
End: 2020-12-28

## 2020-12-28 ENCOUNTER — HOSPITAL ENCOUNTER (OUTPATIENT)
Dept: LAB | Facility: HOSPITAL | Age: 85
Discharge: HOME | End: 2020-12-28
Attending: INTERNAL MEDICINE
Payer: MEDICARE

## 2020-12-28 DIAGNOSIS — I48.0 PAROXYSMAL ATRIAL FIBRILLATION (CMD): ICD-10-CM

## 2020-12-28 DIAGNOSIS — I48.0 PAROXYSMAL ATRIAL FIBRILLATION (HCC): ICD-10-CM

## 2020-12-28 LAB — INR PPP: 1.7

## 2020-12-28 PROCEDURE — 85610 PROTHROMBIN TIME: CPT

## 2020-12-30 ENCOUNTER — HOSPITAL ENCOUNTER (OUTPATIENT)
Dept: BONE DENSITY | Age: 85
Discharge: HOME | End: 2020-12-30
Attending: FAMILY MEDICINE
Payer: MEDICARE

## 2020-12-30 ENCOUNTER — HOSPITAL ENCOUNTER (OUTPATIENT)
Dept: MAMMOGRAPHY | Age: 85
Discharge: HOME | End: 2020-12-30
Attending: FAMILY MEDICINE
Payer: MEDICARE

## 2020-12-30 ENCOUNTER — LAB ENCOUNTER (OUTPATIENT)
Dept: LAB | Age: 85
End: 2020-12-30
Attending: FAMILY MEDICINE
Payer: MEDICARE

## 2020-12-30 DIAGNOSIS — Z79.899 ENCOUNTER FOR LONG-TERM (CURRENT) USE OF MEDICATIONS: ICD-10-CM

## 2020-12-30 DIAGNOSIS — Z79.01 ENCOUNTER FOR CURRENT LONG-TERM USE OF ANTICOAGULANTS: ICD-10-CM

## 2020-12-30 DIAGNOSIS — Z12.31 ENCOUNTER FOR MAMMOGRAM TO ESTABLISH BASELINE MAMMOGRAM: ICD-10-CM

## 2020-12-30 DIAGNOSIS — E78.2 MIXED HYPERLIPIDEMIA: ICD-10-CM

## 2020-12-30 DIAGNOSIS — R94.6 NONSPECIFIC ABNORMAL RESULTS OF THYROID FUNCTION STUDY: ICD-10-CM

## 2020-12-30 DIAGNOSIS — R79.89 HYPOURICEMIA: ICD-10-CM

## 2020-12-30 DIAGNOSIS — M85.88 OSTEOPENIA OF SPINE: ICD-10-CM

## 2020-12-30 DIAGNOSIS — Z12.31 VISIT FOR SCREENING MAMMOGRAM: ICD-10-CM

## 2020-12-30 DIAGNOSIS — I48.91 ATRIAL FIBRILLATION (HCC): ICD-10-CM

## 2020-12-30 DIAGNOSIS — Z51.81 ENCOUNTER FOR THERAPEUTIC DRUG MONITORING: ICD-10-CM

## 2020-12-30 DIAGNOSIS — I10 BENIGN HYPERTENSION: ICD-10-CM

## 2020-12-30 DIAGNOSIS — D64.9 CHRONIC ANEMIA: ICD-10-CM

## 2020-12-30 DIAGNOSIS — D63.1 ANEMIA OF CHRONIC RENAL FAILURE, STAGE 3 (MODERATE) (HCC): Primary | ICD-10-CM

## 2020-12-30 DIAGNOSIS — N18.30 ANEMIA OF CHRONIC RENAL FAILURE, STAGE 3 (MODERATE) (HCC): Primary | ICD-10-CM

## 2020-12-30 DIAGNOSIS — M85.88 MASS OF HARD PALATE: ICD-10-CM

## 2020-12-30 LAB
ALBUMIN SERPL-MCNC: 4.1 G/DL (ref 3.4–5)
ALBUMIN/GLOB SERPL: 1.5 {RATIO} (ref 1–2)
ALP LIVER SERPL-CCNC: 76 U/L
ALT SERPL-CCNC: 27 U/L
ANION GAP SERPL CALC-SCNC: 2 MMOL/L (ref 0–18)
AST SERPL-CCNC: 20 U/L (ref 15–37)
BASOPHILS # BLD AUTO: 0.02 X10(3) UL (ref 0–0.2)
BASOPHILS NFR BLD AUTO: 0.5 %
BILIRUB SERPL-MCNC: 0.8 MG/DL (ref 0.1–2)
BUN BLD-MCNC: 23 MG/DL (ref 7–18)
BUN/CREAT SERPL: 19.8 (ref 10–20)
CALCIUM BLD-MCNC: 9.3 MG/DL (ref 8.5–10.1)
CHLORIDE SERPL-SCNC: 110 MMOL/L (ref 98–112)
CO2 SERPL-SCNC: 30 MMOL/L (ref 21–32)
CREAT BLD-MCNC: 1.16 MG/DL
DEPRECATED RDW RBC AUTO: 52.2 FL (ref 35.1–46.3)
EOSINOPHIL # BLD AUTO: 0.1 X10(3) UL (ref 0–0.7)
EOSINOPHIL NFR BLD AUTO: 2.3 %
ERYTHROCYTE [DISTWIDTH] IN BLOOD BY AUTOMATED COUNT: 14.4 % (ref 11–15)
GLOBULIN PLAS-MCNC: 2.8 G/DL (ref 2.8–4.4)
GLUCOSE BLD-MCNC: 83 MG/DL (ref 70–99)
HAV IGM SER QL: 2.2 MG/DL (ref 1.6–2.6)
HCT VFR BLD AUTO: 37.2 %
HGB BLD-MCNC: 11.7 G/DL
IMM GRANULOCYTES # BLD AUTO: 0.01 X10(3) UL (ref 0–1)
IMM GRANULOCYTES NFR BLD: 0.2 %
LYMPHOCYTES # BLD AUTO: 0.94 X10(3) UL (ref 1–4)
LYMPHOCYTES NFR BLD AUTO: 21.4 %
M PROTEIN MFR SERPL ELPH: 6.9 G/DL (ref 6.4–8.2)
MCH RBC QN AUTO: 31 PG (ref 26–34)
MCHC RBC AUTO-ENTMCNC: 31.5 G/DL (ref 31–37)
MCV RBC AUTO: 98.4 FL
MONOCYTES # BLD AUTO: 0.42 X10(3) UL (ref 0.1–1)
MONOCYTES NFR BLD AUTO: 9.5 %
NEUTROPHILS # BLD AUTO: 2.91 X10 (3) UL (ref 1.5–7.7)
NEUTROPHILS # BLD AUTO: 2.91 X10(3) UL (ref 1.5–7.7)
NEUTROPHILS NFR BLD AUTO: 66.1 %
OSMOLALITY SERPL CALC.SUM OF ELEC: 297 MOSM/KG (ref 275–295)
PATIENT FASTING Y/N/NP: NO
PLATELET # BLD AUTO: 160 10(3)UL (ref 150–450)
POTASSIUM SERPL-SCNC: 4.2 MMOL/L (ref 3.5–5.1)
RBC # BLD AUTO: 3.78 X10(6)UL
SODIUM SERPL-SCNC: 142 MMOL/L (ref 136–145)
T3FREE SERPL-MCNC: 2.25 PG/ML (ref 2.4–4.2)
T4 FREE SERPL-MCNC: 0.7 NG/DL (ref 0.8–1.7)
TSI SER-ACNC: 4.74 MIU/ML (ref 0.36–3.74)
WBC # BLD AUTO: 4.4 X10(3) UL (ref 4–11)

## 2020-12-30 PROCEDURE — 77080 DXA BONE DENSITY AXIAL: CPT | Performed by: FAMILY MEDICINE

## 2020-12-30 PROCEDURE — 84439 ASSAY OF FREE THYROXINE: CPT

## 2020-12-30 PROCEDURE — 84443 ASSAY THYROID STIM HORMONE: CPT

## 2020-12-30 PROCEDURE — 83735 ASSAY OF MAGNESIUM: CPT

## 2020-12-30 PROCEDURE — 77067 SCR MAMMO BI INCL CAD: CPT | Performed by: FAMILY MEDICINE

## 2020-12-30 PROCEDURE — 80053 COMPREHEN METABOLIC PANEL: CPT

## 2020-12-30 PROCEDURE — 36415 COLL VENOUS BLD VENIPUNCTURE: CPT

## 2020-12-30 PROCEDURE — 85025 COMPLETE CBC W/AUTO DIFF WBC: CPT

## 2020-12-30 PROCEDURE — 77063 BREAST TOMOSYNTHESIS BI: CPT | Performed by: FAMILY MEDICINE

## 2020-12-30 PROCEDURE — 84481 FREE ASSAY (FT-3): CPT

## 2021-01-04 ENCOUNTER — HOSPITAL ENCOUNTER (OUTPATIENT)
Dept: LAB | Facility: HOSPITAL | Age: 86
Discharge: HOME | End: 2021-01-04
Attending: INTERNAL MEDICINE
Payer: MEDICARE

## 2021-01-04 ENCOUNTER — ANTI-COAG (OUTPATIENT)
Dept: CARDIOLOGY | Age: 86
End: 2021-01-04

## 2021-01-04 DIAGNOSIS — I48.0 PAROXYSMAL ATRIAL FIBRILLATION (HCC): ICD-10-CM

## 2021-01-04 DIAGNOSIS — I48.0 PAROXYSMAL ATRIAL FIBRILLATION (CMD): ICD-10-CM

## 2021-01-04 LAB
INR PPP: 1.8
POC INR: 1.8 (ref 0.8–1.3)

## 2021-01-04 PROCEDURE — 85610 PROTHROMBIN TIME: CPT

## 2021-01-11 LAB — INR PPP: 2.6

## 2021-01-12 ENCOUNTER — ANTI-COAG (OUTPATIENT)
Dept: CARDIOLOGY | Age: 86
End: 2021-01-12

## 2021-01-12 DIAGNOSIS — I48.0 PAROXYSMAL ATRIAL FIBRILLATION (CMD): ICD-10-CM

## 2021-01-19 ENCOUNTER — ANTI-COAG (OUTPATIENT)
Dept: CARDIOLOGY | Age: 86
End: 2021-01-19

## 2021-01-19 DIAGNOSIS — I48.0 PAROXYSMAL ATRIAL FIBRILLATION (CMD): ICD-10-CM

## 2021-01-19 LAB — INR PPP: 3.2

## 2021-01-26 ENCOUNTER — ANTI-COAG (OUTPATIENT)
Dept: CARDIOLOGY | Age: 86
End: 2021-01-26

## 2021-01-26 DIAGNOSIS — I48.0 PAROXYSMAL ATRIAL FIBRILLATION (CMD): ICD-10-CM

## 2021-01-26 LAB — INR PPP: 3.7

## 2021-02-08 LAB — INR PPP: 1.8

## 2021-02-09 ENCOUNTER — ANTI-COAG (OUTPATIENT)
Dept: CARDIOLOGY | Age: 86
End: 2021-02-09

## 2021-02-09 DIAGNOSIS — I48.0 PAROXYSMAL ATRIAL FIBRILLATION (CMD): ICD-10-CM

## 2021-02-15 LAB — INR PPP: 2.1

## 2021-02-16 ENCOUNTER — ANTI-COAG (OUTPATIENT)
Dept: CARDIOLOGY | Age: 86
End: 2021-02-16

## 2021-02-16 DIAGNOSIS — I48.0 PAROXYSMAL ATRIAL FIBRILLATION (CMD): ICD-10-CM

## 2021-02-23 ENCOUNTER — ANTI-COAG (OUTPATIENT)
Dept: CARDIOLOGY | Age: 86
End: 2021-02-23

## 2021-02-23 DIAGNOSIS — I48.0 PAROXYSMAL ATRIAL FIBRILLATION (CMD): ICD-10-CM

## 2021-02-23 LAB — INR PPP: 1.7

## 2021-03-01 LAB — INR PPP: 1.7

## 2021-03-02 ENCOUNTER — ANTI-COAG (OUTPATIENT)
Dept: CARDIOLOGY | Age: 86
End: 2021-03-02

## 2021-03-02 DIAGNOSIS — I48.0 PAROXYSMAL ATRIAL FIBRILLATION (CMD): ICD-10-CM

## 2021-03-08 LAB — INR PPP: 2.3

## 2021-03-09 ENCOUNTER — ANTI-COAG (OUTPATIENT)
Dept: CARDIOLOGY | Age: 86
End: 2021-03-09

## 2021-03-09 DIAGNOSIS — Z23 NEED FOR VACCINATION: ICD-10-CM

## 2021-03-09 DIAGNOSIS — I48.0 PAROXYSMAL ATRIAL FIBRILLATION (CMD): ICD-10-CM

## 2021-03-22 LAB — INR PPP: 2.1

## 2021-03-23 ENCOUNTER — ANTI-COAG (OUTPATIENT)
Dept: CARDIOLOGY | Age: 86
End: 2021-03-23

## 2021-03-23 DIAGNOSIS — I48.0 PAROXYSMAL ATRIAL FIBRILLATION (CMD): ICD-10-CM

## 2021-04-01 ENCOUNTER — IMMUNIZATION (OUTPATIENT)
Dept: LAB | Age: 86
End: 2021-04-01
Attending: HOSPITALIST
Payer: MEDICARE

## 2021-04-01 DIAGNOSIS — Z23 NEED FOR VACCINATION: Primary | ICD-10-CM

## 2021-04-01 PROCEDURE — 0001A SARSCOV2 VAC 30MCG/0.3ML IM: CPT

## 2021-04-06 ENCOUNTER — TELEPHONE (OUTPATIENT)
Dept: CARDIOLOGY | Age: 86
End: 2021-04-06

## 2021-04-13 ENCOUNTER — OFFICE VISIT (OUTPATIENT)
Dept: PODIATRY CLINIC | Facility: CLINIC | Age: 86
End: 2021-04-13
Payer: MEDICARE

## 2021-04-13 DIAGNOSIS — M20.41 HAMMER TOES OF BOTH FEET: Primary | ICD-10-CM

## 2021-04-13 DIAGNOSIS — B35.1 ONYCHOMYCOSIS: ICD-10-CM

## 2021-04-13 DIAGNOSIS — M67.40 MUCOID CYST, JOINT: ICD-10-CM

## 2021-04-13 DIAGNOSIS — M20.42 HAMMER TOES OF BOTH FEET: Primary | ICD-10-CM

## 2021-04-13 PROCEDURE — 99213 OFFICE O/P EST LOW 20 MIN: CPT | Performed by: PODIATRIST

## 2021-04-13 NOTE — PROGRESS NOTES
Suresh Nicholson is a 80year old female. Patient presents with:  Toenail Care        HPI:   presents today for follow-up evaluation and nail care also complains of this hammertoe deformities with this little cyst on the fourth toe of the right foot. TOTAL REPLACEMENT Left 6/13/2018    Performed by Katelin Lr MD at [address] [place] [address] OR   • HIP TOTAL REPLACEMENT Right 11/13/2013    Performed by Katelin Lr MD at [address] [place] [address] OR   • LIG DIV&STRIPPING [facility]   • LUMBAR FACET INJECTION BI denies chest pain on exertion  GI: denies abdominal pain and denies heartburn  NEURO: denies headaches  MUSCULO: Acknowledges arthritis, back pain      EXAM:   There were no vitals taken for this visit.   Physical Exam  GENERAL: well developed, well nourish

## 2021-04-19 ENCOUNTER — ANTI-COAG (OUTPATIENT)
Dept: CARDIOLOGY | Age: 86
End: 2021-04-19

## 2021-04-19 ENCOUNTER — HOSPITAL ENCOUNTER (OUTPATIENT)
Dept: LAB | Facility: HOSPITAL | Age: 86
Discharge: HOME | End: 2021-04-19
Attending: INTERNAL MEDICINE
Payer: MEDICARE

## 2021-04-19 DIAGNOSIS — I48.0 PAROXYSMAL ATRIAL FIBRILLATION (HCC): ICD-10-CM

## 2021-04-19 DIAGNOSIS — I48.0 PAROXYSMAL ATRIAL FIBRILLATION (CMD): ICD-10-CM

## 2021-04-19 LAB — INR PPP: 1.7

## 2021-04-19 PROCEDURE — 85610 PROTHROMBIN TIME: CPT

## 2021-04-22 ENCOUNTER — IMMUNIZATION (OUTPATIENT)
Dept: LAB | Age: 86
End: 2021-04-22
Attending: HOSPITALIST
Payer: MEDICARE

## 2021-04-22 DIAGNOSIS — Z23 NEED FOR VACCINATION: Primary | ICD-10-CM

## 2021-04-22 PROCEDURE — 0002A SARSCOV2 VAC 30MCG/0.3ML IM: CPT

## 2021-04-29 ENCOUNTER — APPOINTMENT (OUTPATIENT)
Dept: CARDIOLOGY | Age: 86
End: 2021-04-29

## 2021-05-03 ENCOUNTER — HOSPITAL ENCOUNTER (OUTPATIENT)
Dept: LAB | Facility: HOSPITAL | Age: 86
Discharge: HOME | End: 2021-05-03
Attending: INTERNAL MEDICINE
Payer: MEDICARE

## 2021-05-03 ENCOUNTER — ANTI-COAG (OUTPATIENT)
Dept: CARDIOLOGY | Age: 86
End: 2021-05-03

## 2021-05-03 DIAGNOSIS — I48.0 PAROXYSMAL ATRIAL FIBRILLATION (HCC): ICD-10-CM

## 2021-05-03 DIAGNOSIS — I48.0 PAROXYSMAL ATRIAL FIBRILLATION (CMD): ICD-10-CM

## 2021-05-03 LAB — INR PPP: 2

## 2021-05-03 PROCEDURE — 85610 PROTHROMBIN TIME: CPT

## 2021-05-17 ENCOUNTER — HOSPITAL ENCOUNTER (OUTPATIENT)
Dept: LAB | Facility: HOSPITAL | Age: 86
Discharge: HOME | End: 2021-05-17
Attending: INTERNAL MEDICINE
Payer: MEDICARE

## 2021-05-17 ENCOUNTER — ANTI-COAG (OUTPATIENT)
Dept: CARDIOLOGY | Age: 86
End: 2021-05-17

## 2021-05-17 DIAGNOSIS — I48.0 PAROXYSMAL ATRIAL FIBRILLATION (CMD): ICD-10-CM

## 2021-05-17 DIAGNOSIS — I48.0 PAROXYSMAL ATRIAL FIBRILLATION (HCC): ICD-10-CM

## 2021-05-17 LAB — INR PPP: 1.9

## 2021-05-17 PROCEDURE — 85610 PROTHROMBIN TIME: CPT

## 2021-05-28 RX ORDER — WARFARIN SODIUM 5 MG/1
TABLET ORAL
Qty: 90 TABLET | Refills: 0 | Status: SHIPPED | OUTPATIENT
Start: 2021-05-28

## 2021-06-01 ENCOUNTER — HOSPITAL ENCOUNTER (OUTPATIENT)
Dept: LAB | Facility: HOSPITAL | Age: 86
Discharge: HOME | End: 2021-06-01
Attending: INTERNAL MEDICINE
Payer: MEDICARE

## 2021-06-01 DIAGNOSIS — I48.0 PAROXYSMAL ATRIAL FIBRILLATION (HCC): ICD-10-CM

## 2021-06-01 LAB — INR PPP: 2.3

## 2021-06-01 PROCEDURE — 85610 PROTHROMBIN TIME: CPT

## 2021-06-02 ENCOUNTER — ANTI-COAG (OUTPATIENT)
Dept: CARDIOLOGY | Age: 86
End: 2021-06-02

## 2021-06-02 ENCOUNTER — TELEPHONE (OUTPATIENT)
Dept: CARDIOLOGY | Age: 86
End: 2021-06-02

## 2021-06-02 DIAGNOSIS — I48.0 PAROXYSMAL ATRIAL FIBRILLATION (CMD): ICD-10-CM

## 2021-06-11 RX ORDER — TRAMADOL HYDROCHLORIDE 50 MG/1
50 TABLET ORAL EVERY 8 HOURS PRN
COMMUNITY
Start: 2021-04-08 | End: 2021-06-16

## 2021-06-11 RX ORDER — AMOXICILLIN 500 MG/1
CAPSULE ORAL
COMMUNITY
Start: 2021-04-02

## 2021-06-16 ENCOUNTER — ANTI-COAG (OUTPATIENT)
Dept: CARDIOLOGY | Age: 86
End: 2021-06-16

## 2021-06-16 ENCOUNTER — HOSPITAL ENCOUNTER (OUTPATIENT)
Dept: LAB | Facility: HOSPITAL | Age: 86
Discharge: HOME | End: 2021-06-16
Attending: INTERNAL MEDICINE
Payer: MEDICARE

## 2021-06-16 ENCOUNTER — OFFICE VISIT (OUTPATIENT)
Dept: CARDIOLOGY | Age: 86
End: 2021-06-16

## 2021-06-16 VITALS
WEIGHT: 223 LBS | BODY MASS INDEX: 31.22 KG/M2 | SYSTOLIC BLOOD PRESSURE: 110 MMHG | HEIGHT: 71 IN | HEART RATE: 62 BPM | DIASTOLIC BLOOD PRESSURE: 70 MMHG

## 2021-06-16 DIAGNOSIS — I48.0 PAROXYSMAL ATRIAL FIBRILLATION (HCC): ICD-10-CM

## 2021-06-16 DIAGNOSIS — I48.19 PERSISTENT ATRIAL FIBRILLATION (CMD): Primary | ICD-10-CM

## 2021-06-16 DIAGNOSIS — Z79.01 CHRONIC ANTICOAGULATION: ICD-10-CM

## 2021-06-16 DIAGNOSIS — E78.00 PURE HYPERCHOLESTEROLEMIA: ICD-10-CM

## 2021-06-16 DIAGNOSIS — I48.0 PAROXYSMAL ATRIAL FIBRILLATION (CMD): Primary | ICD-10-CM

## 2021-06-16 LAB — INR PPP: 2.1

## 2021-06-16 PROCEDURE — 85610 PROTHROMBIN TIME: CPT

## 2021-06-16 PROCEDURE — 93000 ELECTROCARDIOGRAM COMPLETE: CPT | Performed by: INTERNAL MEDICINE

## 2021-06-16 PROCEDURE — 99214 OFFICE O/P EST MOD 30 MIN: CPT | Performed by: INTERNAL MEDICINE

## 2021-06-16 ASSESSMENT — PATIENT HEALTH QUESTIONNAIRE - PHQ9
CLINICAL INTERPRETATION OF PHQ2 SCORE: NO FURTHER SCREENING NEEDED
2. FEELING DOWN, DEPRESSED OR HOPELESS: NOT AT ALL
CLINICAL INTERPRETATION OF PHQ9 SCORE: NO FURTHER SCREENING NEEDED
SUM OF ALL RESPONSES TO PHQ9 QUESTIONS 1 AND 2: 0
SUM OF ALL RESPONSES TO PHQ9 QUESTIONS 1 AND 2: 0
1. LITTLE INTEREST OR PLEASURE IN DOING THINGS: NOT AT ALL

## 2021-06-30 ENCOUNTER — TELEPHONE (OUTPATIENT)
Dept: CARDIOLOGY | Age: 86
End: 2021-06-30

## 2021-06-30 ENCOUNTER — ANTI-COAG (OUTPATIENT)
Dept: CARDIOLOGY | Age: 86
End: 2021-06-30

## 2021-06-30 ENCOUNTER — HOSPITAL ENCOUNTER (OUTPATIENT)
Dept: LAB | Facility: HOSPITAL | Age: 86
Discharge: HOME | End: 2021-06-30
Attending: INTERNAL MEDICINE
Payer: MEDICARE

## 2021-06-30 DIAGNOSIS — I48.0 PAROXYSMAL ATRIAL FIBRILLATION (CMD): Primary | ICD-10-CM

## 2021-06-30 DIAGNOSIS — I48.0 PAROXYSMAL ATRIAL FIBRILLATION (HCC): ICD-10-CM

## 2021-06-30 LAB
INR PPP: 2.2
POC INR: 2.2 (ref 0.8–1.3)

## 2021-06-30 PROCEDURE — 85610 PROTHROMBIN TIME: CPT

## 2021-07-02 ENCOUNTER — APPOINTMENT (OUTPATIENT)
Dept: CV DIAGNOSTICS | Facility: HOSPITAL | Age: 86
End: 2021-07-02
Attending: HOSPITALIST
Payer: MEDICARE

## 2021-07-02 ENCOUNTER — APPOINTMENT (OUTPATIENT)
Dept: GENERAL RADIOLOGY | Facility: HOSPITAL | Age: 86
End: 2021-07-02
Attending: EMERGENCY MEDICINE
Payer: MEDICARE

## 2021-07-02 ENCOUNTER — HOSPITAL ENCOUNTER (OUTPATIENT)
Facility: HOSPITAL | Age: 86
Setting detail: OBSERVATION
Discharge: HOME | End: 2021-07-03
Attending: EMERGENCY MEDICINE | Admitting: HOSPITALIST
Payer: MEDICARE

## 2021-07-02 DIAGNOSIS — I48.91 ATRIAL FIBRILLATION WITH RVR (HCC): Primary | ICD-10-CM

## 2021-07-02 LAB
ALBUMIN SERPL-MCNC: 4.2 G/DL (ref 3.4–5)
ALBUMIN/GLOB SERPL: 1.3 {RATIO} (ref 1–2)
ALP LIVER SERPL-CCNC: 93 U/L
ALT SERPL-CCNC: 24 U/L
ANION GAP SERPL CALC-SCNC: 4 MMOL/L (ref 0–18)
ANION GAP SERPL CALC-SCNC: 6 MMOL/L (ref 0–18)
APTT PPP: 36.6 SECONDS (ref 25.4–36.1)
AST SERPL-CCNC: 16 U/L (ref 15–37)
ATRIAL RATE: 174 BPM
BASOPHILS # BLD AUTO: 0.02 X10(3) UL (ref 0–0.2)
BASOPHILS # BLD AUTO: 0.02 X10(3) UL (ref 0–0.2)
BASOPHILS NFR BLD AUTO: 0.3 %
BASOPHILS NFR BLD AUTO: 0.4 %
BILIRUB SERPL-MCNC: 0.6 MG/DL (ref 0.1–2)
BUN BLD-MCNC: 21 MG/DL (ref 7–18)
BUN BLD-MCNC: 23 MG/DL (ref 7–18)
BUN/CREAT SERPL: 22.3 (ref 10–20)
BUN/CREAT SERPL: 22.6 (ref 10–20)
CALCIUM BLD-MCNC: 9 MG/DL (ref 8.5–10.1)
CALCIUM BLD-MCNC: 9.3 MG/DL (ref 8.5–10.1)
CHLORIDE SERPL-SCNC: 107 MMOL/L (ref 98–112)
CHLORIDE SERPL-SCNC: 110 MMOL/L (ref 98–112)
CO2 SERPL-SCNC: 27 MMOL/L (ref 21–32)
CO2 SERPL-SCNC: 28 MMOL/L (ref 21–32)
CREAT BLD-MCNC: 0.93 MG/DL
CREAT BLD-MCNC: 1.03 MG/DL
DEPRECATED RDW RBC AUTO: 48.6 FL (ref 35.1–46.3)
DEPRECATED RDW RBC AUTO: 49.4 FL (ref 35.1–46.3)
EOSINOPHIL # BLD AUTO: 0.11 X10(3) UL (ref 0–0.7)
EOSINOPHIL # BLD AUTO: 0.18 X10(3) UL (ref 0–0.7)
EOSINOPHIL NFR BLD AUTO: 2.1 %
EOSINOPHIL NFR BLD AUTO: 3 %
ERYTHROCYTE [DISTWIDTH] IN BLOOD BY AUTOMATED COUNT: 14.4 % (ref 11–15)
ERYTHROCYTE [DISTWIDTH] IN BLOOD BY AUTOMATED COUNT: 14.5 % (ref 11–15)
GLOBULIN PLAS-MCNC: 3.2 G/DL (ref 2.8–4.4)
GLUCOSE BLD-MCNC: 93 MG/DL (ref 70–99)
GLUCOSE BLD-MCNC: 97 MG/DL (ref 70–99)
HAV IGM SER QL: 2.4 MG/DL (ref 1.6–2.6)
HCT VFR BLD AUTO: 34.5 %
HCT VFR BLD AUTO: 37.9 %
HGB BLD-MCNC: 11 G/DL
HGB BLD-MCNC: 12.3 G/DL
IMM GRANULOCYTES # BLD AUTO: 0.02 X10(3) UL (ref 0–1)
IMM GRANULOCYTES # BLD AUTO: 0.03 X10(3) UL (ref 0–1)
IMM GRANULOCYTES NFR BLD: 0.4 %
IMM GRANULOCYTES NFR BLD: 0.5 %
INR BLD: 1.92 (ref 0.89–1.11)
INR BLD: 2.13 (ref 0.89–1.11)
LYMPHOCYTES # BLD AUTO: 1 X10(3) UL (ref 1–4)
LYMPHOCYTES # BLD AUTO: 1.6 X10(3) UL (ref 1–4)
LYMPHOCYTES NFR BLD AUTO: 19.5 %
LYMPHOCYTES NFR BLD AUTO: 26.5 %
M PROTEIN MFR SERPL ELPH: 7.4 G/DL (ref 6.4–8.2)
MCH RBC QN AUTO: 29.8 PG (ref 26–34)
MCH RBC QN AUTO: 30.1 PG (ref 26–34)
MCHC RBC AUTO-ENTMCNC: 31.9 G/DL (ref 31–37)
MCHC RBC AUTO-ENTMCNC: 32.5 G/DL (ref 31–37)
MCV RBC AUTO: 92.7 FL
MCV RBC AUTO: 93.5 FL
MONOCYTES # BLD AUTO: 0.45 X10(3) UL (ref 0.1–1)
MONOCYTES # BLD AUTO: 0.59 X10(3) UL (ref 0.1–1)
MONOCYTES NFR BLD AUTO: 8.8 %
MONOCYTES NFR BLD AUTO: 9.8 %
NEUTROPHILS # BLD AUTO: 3.54 X10 (3) UL (ref 1.5–7.7)
NEUTROPHILS # BLD AUTO: 3.54 X10(3) UL (ref 1.5–7.7)
NEUTROPHILS # BLD AUTO: 3.62 X10 (3) UL (ref 1.5–7.7)
NEUTROPHILS # BLD AUTO: 3.62 X10(3) UL (ref 1.5–7.7)
NEUTROPHILS NFR BLD AUTO: 59.9 %
NEUTROPHILS NFR BLD AUTO: 68.8 %
OSMOLALITY SERPL CALC.SUM OF ELEC: 295 MOSM/KG (ref 275–295)
OSMOLALITY SERPL CALC.SUM OF ELEC: 296 MOSM/KG (ref 275–295)
PLATELET # BLD AUTO: 142 10(3)UL (ref 150–450)
PLATELET # BLD AUTO: 154 10(3)UL (ref 150–450)
POTASSIUM SERPL-SCNC: 3.9 MMOL/L (ref 3.5–5.1)
POTASSIUM SERPL-SCNC: 4.1 MMOL/L (ref 3.5–5.1)
PSA SERPL DL<=0.01 NG/ML-MCNC: 22.5 SECONDS (ref 12.4–14.6)
PSA SERPL DL<=0.01 NG/ML-MCNC: 24.4 SECONDS (ref 12.4–14.6)
Q-T INTERVAL: 282 MS
QRS DURATION: 88 MS
QTC CALCULATION (BEZET): 467 MS
R AXIS: 31 DEGREES
RBC # BLD AUTO: 3.69 X10(6)UL
RBC # BLD AUTO: 4.09 X10(6)UL
SODIUM SERPL-SCNC: 141 MMOL/L (ref 136–145)
SODIUM SERPL-SCNC: 141 MMOL/L (ref 136–145)
T AXIS: -5 DEGREES
TROPONIN I SERPL-MCNC: <0.045 NG/ML (ref ?–0.04)
VENTRICULAR RATE: 165 BPM
WBC # BLD AUTO: 5.1 X10(3) UL (ref 4–11)
WBC # BLD AUTO: 6 X10(3) UL (ref 4–11)

## 2021-07-02 PROCEDURE — B246ZZZ ULTRASONOGRAPHY OF RIGHT AND LEFT HEART: ICD-10-PCS | Performed by: INTERNAL MEDICINE

## 2021-07-02 PROCEDURE — 93306 TTE W/DOPPLER COMPLETE: CPT | Performed by: HOSPITALIST

## 2021-07-02 PROCEDURE — 71045 X-RAY EXAM CHEST 1 VIEW: CPT | Performed by: EMERGENCY MEDICINE

## 2021-07-02 PROCEDURE — 99220 INITIAL OBSERVATION CARE,LEVL III: CPT | Performed by: HOSPITALIST

## 2021-07-02 RX ORDER — CALCIUM CARBONATE/VITAMIN D3 250-3.125
2 TABLET ORAL DAILY
Status: DISCONTINUED | OUTPATIENT
Start: 2021-07-02 | End: 2021-07-03

## 2021-07-02 RX ORDER — TORSEMIDE 20 MG/1
20 TABLET ORAL EVERY OTHER DAY
Status: DISCONTINUED | OUTPATIENT
Start: 2021-07-03 | End: 2021-07-03

## 2021-07-02 RX ORDER — METOPROLOL SUCCINATE 25 MG/1
25 TABLET, EXTENDED RELEASE ORAL
Status: DISCONTINUED | OUTPATIENT
Start: 2021-07-02 | End: 2021-07-03

## 2021-07-02 RX ORDER — METOPROLOL TARTRATE 5 MG/5ML
INJECTION INTRAVENOUS
Status: DISCONTINUED
Start: 2021-07-02 | End: 2021-07-02 | Stop reason: WASHOUT

## 2021-07-02 RX ORDER — FLECAINIDE ACETATE 100 MG/1
100 TABLET ORAL EVERY 12 HOURS SCHEDULED
Status: DISCONTINUED | OUTPATIENT
Start: 2021-07-02 | End: 2021-07-03

## 2021-07-02 RX ORDER — PANTOPRAZOLE SODIUM 40 MG/1
40 TABLET, DELAYED RELEASE ORAL
Status: DISCONTINUED | OUTPATIENT
Start: 2021-07-02 | End: 2021-07-03

## 2021-07-02 RX ORDER — MELATONIN
325
Status: DISCONTINUED | OUTPATIENT
Start: 2021-07-02 | End: 2021-07-03

## 2021-07-02 RX ORDER — ONDANSETRON 2 MG/ML
4 INJECTION INTRAMUSCULAR; INTRAVENOUS EVERY 6 HOURS PRN
Status: DISCONTINUED | OUTPATIENT
Start: 2021-07-02 | End: 2021-07-03

## 2021-07-02 RX ORDER — METOCLOPRAMIDE HYDROCHLORIDE 5 MG/ML
10 INJECTION INTRAMUSCULAR; INTRAVENOUS EVERY 8 HOURS PRN
Status: DISCONTINUED | OUTPATIENT
Start: 2021-07-02 | End: 2021-07-03

## 2021-07-02 RX ORDER — ACETAMINOPHEN 500 MG
500 TABLET ORAL EVERY 6 HOURS PRN
Status: DISCONTINUED | OUTPATIENT
Start: 2021-07-02 | End: 2021-07-03

## 2021-07-02 RX ORDER — ATORVASTATIN CALCIUM 10 MG/1
10 TABLET, FILM COATED ORAL NIGHTLY
Status: DISCONTINUED | OUTPATIENT
Start: 2021-07-02 | End: 2021-07-03

## 2021-07-02 RX ORDER — METOPROLOL TARTRATE 5 MG/5ML
INJECTION INTRAVENOUS
Status: COMPLETED
Start: 2021-07-02 | End: 2021-07-02

## 2021-07-02 RX ORDER — TORSEMIDE 20 MG/1
40 TABLET ORAL EVERY OTHER DAY
Status: DISCONTINUED | OUTPATIENT
Start: 2021-07-02 | End: 2021-07-03

## 2021-07-02 RX ORDER — METOPROLOL TARTRATE 5 MG/5ML
5 INJECTION INTRAVENOUS EVERY 5 MIN PRN
Status: COMPLETED | OUTPATIENT
Start: 2021-07-02 | End: 2021-07-02

## 2021-07-02 RX ORDER — WARFARIN SODIUM 5 MG/1
5 TABLET ORAL
Status: COMPLETED | OUTPATIENT
Start: 2021-07-02 | End: 2021-07-02

## 2021-07-02 NOTE — PROGRESS NOTES
[facility] Dosing Service  Warfarin (Coumadin) Initial Dosing    Eliza Paz is a 80year old patient for whom pharmacy has been consulted to dose warfarin (COUMADIN) for atrial fibrillation by Dr. Sushila Alfonso.   Based on this indication, goal INR is 2-

## 2021-07-02 NOTE — CONSULTS
[place] Report of Consultation      [address] [facility] Patient Status:  Observation    1934 MRN [id number]   [facility] 8NE-A Attending Kim Pena MD   Hosp Day # 0 PCP Harish Perry MD     Reason for Consultation SURGICAL HISTORY      Mastoidectomy   • OTHER SURGICAL HISTORY      Venous Ligation with Stripping   • OTHER SURGICAL HISTORY      Venous Sclerosing By Injection   • TONSILLECTOMY     • TOTAL HIP REPLACEMENT Right 12/2013   • VEIN CLINIC - [facility] except as described above in HPI. Physical Exam:  Blood pressure 109/65, pulse (!) 124, temperature 97.8 °F (36.6 °C), temperature source Oral, resp. rate 19, height 71\", weight 225 lb 5 oz (102.2 kg), SpO2 99 %.   Temp (24hrs), Av.9 °F (36.6 °C), M

## 2021-07-02 NOTE — ED PROVIDER NOTES
Patient Seen in: [facility] Emergency Department      History   Patient presents with:  Arrythmia/Palpitations    Stated Complaint:     HPI/Subjective:   HPI    Padma Elam is a pleasant 59-year-old female who was awakened an hour ago with chest pain an reviewed and negative except as noted above.     Physical Exam     ED Triage Vitals   BP 07/02/21 0155 (!) 151/114   Pulse 07/02/21 0155 (!) 175   Resp 07/02/21 0155 18   Temp 07/02/21 0155 98.1 °F (36.7 °C)   Temp src 07/02/21 0155 Temporal   SpO2 07/02/21 Please view results for these tests on the individual orders. RAINBOW DRAW BLUE   RAINBOW DRAW LAVENDER   RAINBOW DRAW LIGHT GREEN   RAINBOW DRAW GOLD     EKG    Rate, intervals and axes as noted on EKG Report.   Rate: 165  Rhythm: Atrial [employer]

## 2021-07-02 NOTE — SIGNIFICANT EVENT
Received patient from ed. She was alert and oriented and not showing any signs of distress. She is currently on afib at 110s. cardizem gtt is ongoing. Needs attended.

## 2021-07-02 NOTE — H&P
[facility] HOSPITAL[facility]  History and Physical     Dara Oniel [place] Patient Status:  Emergency    1934 MRN [id number]   Location [address] [facility] Attending Medardo Ortez MD   Hosp Day # 0 PCP Roseanne Aguilar MD     Chief Comp that she does not drink alcohol and does not use drugs.     Family History:   Family History   Problem Relation Age of Onset   • Other (Other) Father         anemia   • Cancer Maternal Grandfather         colon   • Cancer Maternal Grandmother         colon comprehensive 14 point review of systems was completed. Pertinent positives and negatives noted in the HPI.     Physical Exam:    /75 (BP Location: Left arm)   Pulse (!) 124   Temp 97.8 °F (36.6 °C) (Oral)   Resp 14   Ht 5' 11\" (1.803 m)   Wt 225 nodals  Coumadin  Monitor INR  Check ECHO  Monitor hemodynamics  Telemetry  Cardiology consult     Quality:  · DVT Prophylaxis: Coumadin   · CODE status: Full  · Cronin: No  · If COVID testing is negative, may discontinue isolation: Yes     Plan of care dis

## 2021-07-02 NOTE — ED INITIAL ASSESSMENT (HPI)
80year old female woke up 1 hour ago with her heart racing. Denies any recent illness, N/V/D. A&Ox4.

## 2021-07-03 VITALS
DIASTOLIC BLOOD PRESSURE: 50 MMHG | SYSTOLIC BLOOD PRESSURE: 110 MMHG | TEMPERATURE: 98 F | OXYGEN SATURATION: 98 % | HEART RATE: 54 BPM | WEIGHT: 222.69 LBS | BODY MASS INDEX: 31.18 KG/M2 | RESPIRATION RATE: 20 BRPM | HEIGHT: 71 IN

## 2021-07-03 LAB
INR BLD: 2 (ref 0.89–1.11)
PSA SERPL DL<=0.01 NG/ML-MCNC: 23.2 SECONDS (ref 12.4–14.6)

## 2021-07-03 PROCEDURE — 99217 OBSERVATION CARE DISCHARGE: CPT | Performed by: INTERNAL MEDICINE

## 2021-07-03 RX ORDER — FLECAINIDE ACETATE 100 MG/1
100 TABLET ORAL EVERY 12 HOURS SCHEDULED
Qty: 60 TABLET | Refills: 5 | Status: SHIPPED | OUTPATIENT
Start: 2021-07-03

## 2021-07-03 RX ORDER — WARFARIN SODIUM 5 MG/1
5 TABLET ORAL
Status: DISCONTINUED | OUTPATIENT
Start: 2021-07-03 | End: 2021-07-03

## 2021-07-03 NOTE — DISCHARGE SUMMARY
[facility] HOSPITALIST  DISCHARGE SUMMARY     [address] [place] Patient Status:  Observation    1934 MRN [id number]   [facility] 8NE-A Attending No att. providers found   Hosp Day # 0 PCP Shanika Pearce MD     Date of Admission: 20 TYLENOL EXTRA STRENGTH      Take 500 mg by mouth every 6 (six) hours as needed for Pain. Refills: 0     TYLENOL ARTHRITIS EXT RELIEF OR      Take 2 tablets by mouth 2 (two) times daily.    Refills: 0     CALCIUM 600 + D OR      Take 1 tablet by mouth 2 (t min [place], [place]    Patient Instructions:                    7/20/2021 11:00 AM  EDW CARD FINGERSTICK [1626] 10 min Leigha Matute    Patient Instr caregiver. Because of the highly sensitive equipment and to ensure the privacy of all our patients, children will not be permitted in the exam rooms, unless otherwise noted and in accordance with departmental policy.   PATIENT RESPONSIBILITY ESTIMATE  - (Es

## 2021-07-03 NOTE — PROGRESS NOTES
[facility] Dosing Service  Warfarin (Coumadin) Subsequent Dosing    Mariah Monsivais is a 80year old patient for whom pharmacy is dosing warfarin (Coumadin).  Goal INR is 2-3    Recent Labs   Lab 06/30/21  1056 07/02/21  0158 07/02/21  0505 07/03/21  0

## 2021-07-03 NOTE — PLAN OF CARE
Assumed care of pt @ 9331. Pt is A/Ox 4. On RA, VSS, afib on tele. IV with cardizem gtt going, at 10gtt/hr  Tolerating cardiac diet. Pt denies pain  Up as tolerated  Intake/outputs WNL.     Plan: wean off cardizem, leave at 5gtt/hr, start flecainide a

## 2021-07-03 NOTE — PLAN OF CARE
1330- Cleared for dc by both primary and cardiology. Saline lock and tele d/c'd. Pt aware to call Cardiology to schedule follow up appt and informed pt to inquire about next INR draw. Verbalizes understanding. Await transport. Daughters at bs.      Assumed Initiate emergency measures for life threatening arrhythmias  - Monitor electrolytes and administer replacement therapy as ordered  Outcome: Progressing

## 2021-07-03 NOTE — PLAN OF CARE
Problem: Patient/Family Goals  Goal: Patient/Family Long Term Goal  Description: Patient's Long Term Goal: will be able to go home  without complication    Interventions:    - See additional Care Plan goals for specific interventions  7/2/2021 4777 by Saeed Kim

## 2021-07-03 NOTE — PROGRESS NOTES
[address] [facility] Patient Status:  Observation    1934 MRN [id number]   [facility] 8NE-A Attending Renetta Gallagher MD   Hosp Day # 0 PCP Liliana Josue MD     Assessment/Nettie rhythm, S1, S2 normal, no murmur  Lungs: Clear without wheezes, rales, rhonchi. Normal excursions and effort. Abdomen: Soft, non-tender. Extremities: Without clubbing, cyanosis or edema. Peripheral pulses are 2+. Skin: Warm and dry.      Labs:     Lab

## 2021-07-06 LAB
ATRIAL RATE: 55 BPM
P AXIS: 33 DEGREES
P-R INTERVAL: 200 MS
Q-T INTERVAL: 484 MS
QRS DURATION: 96 MS
QTC CALCULATION (BEZET): 463 MS
R AXIS: -8 DEGREES
T AXIS: 47 DEGREES
VENTRICULAR RATE: 55 BPM

## 2021-07-09 ENCOUNTER — TELEPHONE (OUTPATIENT)
Dept: CARDIOLOGY | Age: 86
End: 2021-07-09

## 2021-07-19 ENCOUNTER — HOSPITAL ENCOUNTER (OUTPATIENT)
Dept: LAB | Facility: HOSPITAL | Age: 86
Discharge: HOME | End: 2021-07-19
Attending: FAMILY MEDICINE
Payer: MEDICARE

## 2021-07-19 DIAGNOSIS — I48.0 PAROXYSMAL ATRIAL FIBRILLATION (HCC): ICD-10-CM

## 2021-07-19 LAB — POC INR: 2.2 (ref 0.9–1.1)

## 2021-07-19 PROCEDURE — 85610 PROTHROMBIN TIME: CPT

## 2021-07-20 ENCOUNTER — TELEPHONE (OUTPATIENT)
Dept: CARDIOLOGY | Age: 86
End: 2021-07-20

## 2021-07-21 ENCOUNTER — ANTI-COAG (OUTPATIENT)
Dept: CARDIOLOGY | Age: 86
End: 2021-07-21

## 2021-07-21 DIAGNOSIS — I48.0 PAROXYSMAL ATRIAL FIBRILLATION (CMD): Primary | ICD-10-CM

## 2021-08-10 ENCOUNTER — HOSPITAL ENCOUNTER (OUTPATIENT)
Dept: LAB | Facility: HOSPITAL | Age: 86
Discharge: HOME | End: 2021-08-10
Attending: INTERNAL MEDICINE
Payer: MEDICARE

## 2021-08-10 DIAGNOSIS — I48.0 PAROXYSMAL ATRIAL FIBRILLATION (HCC): ICD-10-CM

## 2021-08-10 LAB — POC INR: 1.8 (ref 0.9–1.1)

## 2021-08-10 PROCEDURE — 85610 PROTHROMBIN TIME: CPT

## 2021-08-16 ENCOUNTER — OFFICE VISIT (OUTPATIENT)
Dept: PODIATRY CLINIC | Facility: CLINIC | Age: 86
End: 2021-08-16
Payer: MEDICARE

## 2021-08-16 VITALS — WEIGHT: 225 LBS | BODY MASS INDEX: 31.5 KG/M2 | HEIGHT: 71 IN

## 2021-08-16 DIAGNOSIS — B35.1 ONYCHOMYCOSIS: ICD-10-CM

## 2021-08-16 DIAGNOSIS — N18.30 STAGE 3 CHRONIC KIDNEY DISEASE, UNSPECIFIED WHETHER STAGE 3A OR 3B CKD (HCC): ICD-10-CM

## 2021-08-16 DIAGNOSIS — M20.42 HAMMER TOES OF BOTH FEET: Primary | ICD-10-CM

## 2021-08-16 DIAGNOSIS — M20.41 HAMMER TOES OF BOTH FEET: Primary | ICD-10-CM

## 2021-08-16 PROCEDURE — 11721 DEBRIDE NAIL 6 OR MORE: CPT | Performed by: PODIATRIST

## 2021-08-16 NOTE — PROGRESS NOTES
Lima Downing is a 80year old female. Patient presents with:  Toenail Care: Routine toenail care. No issues.   Pt unable to trim own nails due to  thickness        HPI:   This pleasant patient who is unable to care for her own toenails with a known h Past Medical History:   Diagnosis Date   • Anal spasm    • Arrhythmia 12/2017    atrial fibrillation   • BLOOD CLOTS    • Dermatophytosis of nail    • Dizziness and giddiness    • HBP (high blood pressure)    • High blood cholesterol    • HIGH BLOOD walks occ, PT exercises at  home          REVIEW OF SYSTEMS:   Today reviewed systens as documented below  GENERAL HEALTH: feels well otherwise  SKIN: Refer to exam below  RESPIRATORY: denies shortness of breath with exertion  CARDIOVASCULAR: denies chest on both feet. This was done uneventfully there was no hemorrhage. There is mild incurvation of the medial and lateral nail borders of the hallux which using a slant back technique were removed and they would not get ingrown.   Today using a 15 blade anthony

## 2021-08-19 ENCOUNTER — TELEPHONE (OUTPATIENT)
Dept: PODIATRY CLINIC | Facility: CLINIC | Age: 86
End: 2021-08-19

## 2021-08-19 NOTE — TELEPHONE ENCOUNTER
Phoned patient, left voicemail requesting return call with date of last primary care visit. Need date of last primary care visit to submit August 16,2021 visit to insurance.

## 2021-08-20 NOTE — TELEPHONE ENCOUNTER
Pt calling states it was in November of last year, also stated was seen on 3/31/21 because of a fall please advise

## 2021-08-20 NOTE — TELEPHONE ENCOUNTER
Val Dailey,    This is the follow up from my calls out to patient's yesterday regarding their last primary care visit. This patient was on that claim pending list.  Thank you.

## 2021-08-23 RX ORDER — WARFARIN SODIUM 5 MG/1
TABLET ORAL
Qty: 90 TABLET | Refills: 0 | OUTPATIENT
Start: 2021-08-23

## 2021-08-26 ENCOUNTER — HOSPITAL ENCOUNTER (OUTPATIENT)
Dept: LAB | Facility: HOSPITAL | Age: 86
Discharge: HOME | End: 2021-08-26
Attending: INTERNAL MEDICINE
Payer: MEDICARE

## 2021-08-26 DIAGNOSIS — Z79.01 LONG TERM (CURRENT) USE OF ANTICOAGULANTS: ICD-10-CM

## 2021-08-26 LAB — POC INR: 2.3 (ref 0.9–1.1)

## 2021-08-26 PROCEDURE — 85610 PROTHROMBIN TIME: CPT

## 2021-09-16 ENCOUNTER — HOSPITAL ENCOUNTER (OUTPATIENT)
Dept: LAB | Facility: HOSPITAL | Age: 86
Discharge: HOME | End: 2021-09-16
Attending: INTERNAL MEDICINE
Payer: MEDICARE

## 2021-09-16 DIAGNOSIS — Z79.01 LONG TERM (CURRENT) USE OF ANTICOAGULANTS: ICD-10-CM

## 2021-09-16 LAB — POC INR: 2.3 (ref 0.9–1.1)

## 2021-09-16 PROCEDURE — 85610 PROTHROMBIN TIME: CPT

## 2021-10-14 ENCOUNTER — HOSPITAL ENCOUNTER (OUTPATIENT)
Dept: LAB | Facility: HOSPITAL | Age: 86
Discharge: HOME | End: 2021-10-14
Attending: INTERNAL MEDICINE
Payer: MEDICARE

## 2021-10-14 DIAGNOSIS — Z79.01 LONG TERM (CURRENT) USE OF ANTICOAGULANTS: ICD-10-CM

## 2021-10-14 PROCEDURE — 85610 PROTHROMBIN TIME: CPT

## 2021-10-28 ENCOUNTER — HOSPITAL ENCOUNTER (OUTPATIENT)
Dept: LAB | Facility: HOSPITAL | Age: 86
Discharge: HOME | End: 2021-10-28
Attending: INTERNAL MEDICINE
Payer: MEDICARE

## 2021-10-28 DIAGNOSIS — Z79.01 LONG TERM (CURRENT) USE OF ANTICOAGULANTS: ICD-10-CM

## 2021-10-28 PROCEDURE — 85610 PROTHROMBIN TIME: CPT

## 2021-11-15 ENCOUNTER — HOSPITAL ENCOUNTER (OUTPATIENT)
Dept: LAB | Facility: HOSPITAL | Age: 86
Discharge: HOME | End: 2021-11-15
Attending: INTERNAL MEDICINE
Payer: MEDICARE

## 2021-11-15 DIAGNOSIS — Z79.01 LONG TERM (CURRENT) USE OF ANTICOAGULANTS: ICD-10-CM

## 2021-11-15 PROCEDURE — 85610 PROTHROMBIN TIME: CPT

## 2021-12-13 ENCOUNTER — OFFICE VISIT (OUTPATIENT)
Dept: PODIATRY CLINIC | Facility: CLINIC | Age: 86
End: 2021-12-13
Payer: MEDICARE

## 2021-12-13 ENCOUNTER — APPOINTMENT (OUTPATIENT)
Dept: LAB | Facility: HOSPITAL | Age: 86
End: 2021-12-13
Payer: MEDICARE

## 2021-12-13 ENCOUNTER — LAB ENCOUNTER (OUTPATIENT)
Dept: LAB | Facility: HOSPITAL | Age: 86
End: 2021-12-13
Attending: FAMILY MEDICINE
Payer: MEDICARE

## 2021-12-13 DIAGNOSIS — L60.0 INGROWING TOENAIL: ICD-10-CM

## 2021-12-13 DIAGNOSIS — M20.42 HAMMER TOES OF BOTH FEET: ICD-10-CM

## 2021-12-13 DIAGNOSIS — Z79.899 NEED FOR PROPHYLACTIC CHEMOTHERAPY: ICD-10-CM

## 2021-12-13 DIAGNOSIS — M67.40 MUCOID CYST, JOINT: ICD-10-CM

## 2021-12-13 DIAGNOSIS — E66.9 BMI (BODY MASS INDEX) PEDIATRIC, > 99% FOR AGE, OBESE CHILD, TERTIARY CARE INTERVENTION: Primary | ICD-10-CM

## 2021-12-13 DIAGNOSIS — B35.1 ONYCHOMYCOSIS: ICD-10-CM

## 2021-12-13 DIAGNOSIS — N18.30 STAGE 3 CHRONIC KIDNEY DISEASE, UNSPECIFIED WHETHER STAGE 3A OR 3B CKD (HCC): ICD-10-CM

## 2021-12-13 DIAGNOSIS — L97.501 ULCER OF FOOT, LIMITED TO BREAKDOWN OF SKIN, UNSPECIFIED LATERALITY (HCC): ICD-10-CM

## 2021-12-13 DIAGNOSIS — R60.0 LOCALIZED EDEMA: ICD-10-CM

## 2021-12-13 DIAGNOSIS — M20.41 HAMMER TOES OF BOTH FEET: ICD-10-CM

## 2021-12-13 PROCEDURE — 85025 COMPLETE CBC W/AUTO DIFF WBC: CPT

## 2021-12-13 PROCEDURE — 80053 COMPREHEN METABOLIC PANEL: CPT

## 2021-12-13 PROCEDURE — 11721 DEBRIDE NAIL 6 OR MORE: CPT | Performed by: PODIATRIST

## 2021-12-13 PROCEDURE — 85610 PROTHROMBIN TIME: CPT

## 2021-12-13 PROCEDURE — 84443 ASSAY THYROID STIM HORMONE: CPT

## 2021-12-13 PROCEDURE — 36415 COLL VENOUS BLD VENIPUNCTURE: CPT

## 2021-12-13 NOTE — PROGRESS NOTES
Dimas Grayson is a 80year old female. Patient presents with:  Toenail Care: Routine nail care 87yr female unable to cut her own nails, pt denies any pain no c/o today.         HPI:   This pleasant patient who is unable to care for her own toenails wit Past Medical History:   Diagnosis Date   • Anal spasm    • Arrhythmia 12/2017    atrial fibrillation   • BLOOD CLOTS    • Dermatophytosis of nail    • Dizziness and giddiness    • HBP (high blood pressure)    • High blood cholesterol    • HIGH BLOOD otherwise  SKIN: Refer to exam below  RESPIRATORY: denies shortness of breath with exertion  CARDIOVASCULAR: denies chest pain on exertion  GI: denies abdominal pain and denies heartburn  NEURO: denies headaches    EXAM:   There were no vitals taken for th uneventfully there was no hemorrhage. There is mild incurvation of the medial and lateral nail borders of the hallux which using a slant back technique were removed and they would not get ingrown.   Today using a 15 blade trimmed down and debrided the 2 hy

## 2021-12-17 ENCOUNTER — TELEPHONE (OUTPATIENT)
Dept: PODIATRY CLINIC | Facility: CLINIC | Age: 86
End: 2021-12-17

## 2021-12-17 NOTE — TELEPHONE ENCOUNTER
Please make sure that her nephrologist last visit gets added to her note or if you do send it to me I can added to the note however that needs to then be forwarded to [address] and ryan thank you

## 2021-12-17 NOTE — PROGRESS NOTES
Please find out when the last time this patient saw her nephrologist is and make sure that Guevara Vee reckon billing gets that information or Cutilletta to the chart thank you

## 2021-12-21 NOTE — TELEPHONE ENCOUNTER
S/w Dr. Judy Colmenaress and he states he needs pt LOV with Nephrologist and fwd to Brittanie Ziegler in coding since pt has medicare will need proof of visit within the last 6 months.    S/w Sriram Murillo in coding who states she does not need the LOV from provider but just

## 2022-01-03 ENCOUNTER — HOSPITAL ENCOUNTER (OUTPATIENT)
Dept: MAMMOGRAPHY | Age: 87
Discharge: HOME | End: 2022-01-03
Attending: FAMILY MEDICINE
Payer: MEDICARE

## 2022-01-03 DIAGNOSIS — Z12.31 ENCOUNTER FOR SCREENING MAMMOGRAM FOR MALIGNANT NEOPLASM OF BREAST: ICD-10-CM

## 2022-01-03 DIAGNOSIS — Z12.39 SCREENING BREAST EXAMINATION: ICD-10-CM

## 2022-01-03 PROCEDURE — 77067 SCR MAMMO BI INCL CAD: CPT | Performed by: FAMILY MEDICINE

## 2022-01-03 PROCEDURE — 77063 BREAST TOMOSYNTHESIS BI: CPT | Performed by: FAMILY MEDICINE

## 2022-01-05 ENCOUNTER — HOSPITAL ENCOUNTER (OUTPATIENT)
Dept: MAMMOGRAPHY | Facility: HOSPITAL | Age: 87
Discharge: HOME | End: 2022-01-05
Attending: FAMILY MEDICINE
Payer: MEDICARE

## 2022-01-05 DIAGNOSIS — R92.2 INCONCLUSIVE MAMMOGRAM: ICD-10-CM

## 2022-01-05 PROCEDURE — 77061 BREAST TOMOSYNTHESIS UNI: CPT | Performed by: FAMILY MEDICINE

## 2022-01-05 PROCEDURE — 76642 ULTRASOUND BREAST LIMITED: CPT | Performed by: FAMILY MEDICINE

## 2022-01-05 PROCEDURE — 77065 DX MAMMO INCL CAD UNI: CPT | Performed by: FAMILY MEDICINE

## 2022-04-09 ENCOUNTER — HOSPITAL ENCOUNTER (OUTPATIENT)
Dept: GENERAL RADIOLOGY | Facility: HOSPITAL | Age: 87
Discharge: HOME | End: 2022-04-09
Attending: FAMILY MEDICINE
Payer: MEDICARE

## 2022-04-09 DIAGNOSIS — M25.519: ICD-10-CM

## 2022-04-09 DIAGNOSIS — M54.6 PAIN IN THORACIC SPINE: ICD-10-CM

## 2022-04-09 PROCEDURE — 72072 X-RAY EXAM THORAC SPINE 3VWS: CPT | Performed by: FAMILY MEDICINE

## 2022-04-14 ENCOUNTER — OFFICE VISIT (OUTPATIENT)
Dept: PODIATRY CLINIC | Facility: CLINIC | Age: 87
End: 2022-04-14
Payer: MEDICARE

## 2022-04-14 DIAGNOSIS — B35.1 ONYCHOMYCOSIS: ICD-10-CM

## 2022-04-14 DIAGNOSIS — N18.30 STAGE 3 CHRONIC KIDNEY DISEASE, UNSPECIFIED WHETHER STAGE 3A OR 3B CKD (HCC): ICD-10-CM

## 2022-04-14 DIAGNOSIS — L60.0 INGROWING TOENAIL: ICD-10-CM

## 2022-04-14 DIAGNOSIS — L97.501 ULCER OF FOOT, LIMITED TO BREAKDOWN OF SKIN, UNSPECIFIED LATERALITY (HCC): ICD-10-CM

## 2022-04-14 DIAGNOSIS — M20.42 HAMMER TOES OF BOTH FEET: Primary | ICD-10-CM

## 2022-04-14 DIAGNOSIS — M20.41 HAMMER TOES OF BOTH FEET: Primary | ICD-10-CM

## 2022-04-14 PROCEDURE — 11721 DEBRIDE NAIL 6 OR MORE: CPT | Performed by: PODIATRIST

## 2022-04-19 ENCOUNTER — HOSPITAL ENCOUNTER (OUTPATIENT)
Dept: LAB | Facility: HOSPITAL | Age: 87
Discharge: HOME | End: 2022-04-19
Attending: FAMILY MEDICINE
Payer: MEDICARE

## 2022-04-19 DIAGNOSIS — Z79.01 LONG TERM (CURRENT) USE OF ANTICOAGULANTS: ICD-10-CM

## 2022-04-19 LAB
ALBUMIN SERPL-MCNC: 3.9 G/DL (ref 3.4–5)
ALBUMIN/GLOB SERPL: 1.3 {RATIO} (ref 1–2)
ALP LIVER SERPL-CCNC: 70 U/L
ALT SERPL-CCNC: 26 U/L
ANION GAP SERPL CALC-SCNC: 5 MMOL/L (ref 0–18)
AST SERPL-CCNC: 8 U/L (ref 15–37)
BASOPHILS # BLD AUTO: 0.01 X10(3) UL (ref 0–0.2)
BASOPHILS NFR BLD AUTO: 0.2 %
BILIRUB SERPL-MCNC: 1.1 MG/DL (ref 0.1–2)
BUN BLD-MCNC: 25 MG/DL (ref 7–18)
CALCIUM BLD-MCNC: 9.7 MG/DL (ref 8.5–10.1)
CHLORIDE SERPL-SCNC: 110 MMOL/L (ref 98–112)
CO2 SERPL-SCNC: 27 MMOL/L (ref 21–32)
CREAT BLD-MCNC: 1.36 MG/DL
EOSINOPHIL # BLD AUTO: 0.12 X10(3) UL (ref 0–0.7)
EOSINOPHIL NFR BLD AUTO: 1.9 %
ERYTHROCYTE [DISTWIDTH] IN BLOOD BY AUTOMATED COUNT: 14.9 %
FASTING STATUS PATIENT QL REPORTED: NO
GLOBULIN PLAS-MCNC: 3.1 G/DL (ref 2.8–4.4)
GLUCOSE BLD-MCNC: 94 MG/DL (ref 70–99)
HCT VFR BLD AUTO: 40.8 %
HGB BLD-MCNC: 12.3 G/DL
IMM GRANULOCYTES # BLD AUTO: 0.03 X10(3) UL (ref 0–1)
IMM GRANULOCYTES NFR BLD: 0.5 %
INR BLDC: 3.3 (ref 0.9–1.1)
LYMPHOCYTES # BLD AUTO: 1.08 X10(3) UL (ref 1–4)
LYMPHOCYTES NFR BLD AUTO: 17.4 %
MCH RBC QN AUTO: 29.4 PG (ref 26–34)
MCHC RBC AUTO-ENTMCNC: 30.1 G/DL (ref 31–37)
MCV RBC AUTO: 97.4 FL
MONOCYTES # BLD AUTO: 0.66 X10(3) UL (ref 0.1–1)
MONOCYTES NFR BLD AUTO: 10.6 %
NEUTROPHILS # BLD AUTO: 4.32 X10 (3) UL (ref 1.5–7.7)
NEUTROPHILS # BLD AUTO: 4.32 X10(3) UL (ref 1.5–7.7)
NEUTROPHILS NFR BLD AUTO: 69.4 %
OSMOLALITY SERPL CALC.SUM OF ELEC: 298 MOSM/KG (ref 275–295)
PLATELET # BLD AUTO: 148 10(3)UL (ref 150–450)
POTASSIUM SERPL-SCNC: 4.6 MMOL/L (ref 3.5–5.1)
PROT SERPL-MCNC: 7 G/DL (ref 6.4–8.2)
RBC # BLD AUTO: 4.19 X10(6)UL
SODIUM SERPL-SCNC: 142 MMOL/L (ref 136–145)
WBC # BLD AUTO: 6.2 X10(3) UL (ref 4–11)

## 2022-04-19 PROCEDURE — 36415 COLL VENOUS BLD VENIPUNCTURE: CPT | Performed by: FAMILY MEDICINE

## 2022-04-19 PROCEDURE — 85025 COMPLETE CBC W/AUTO DIFF WBC: CPT | Performed by: FAMILY MEDICINE

## 2022-04-19 PROCEDURE — 80053 COMPREHEN METABOLIC PANEL: CPT | Performed by: FAMILY MEDICINE

## 2022-04-19 PROCEDURE — 85610 PROTHROMBIN TIME: CPT

## 2022-05-02 ENCOUNTER — TELEPHONE (OUTPATIENT)
Dept: OBGYN CLINIC | Facility: CLINIC | Age: 87
End: 2022-05-02

## 2022-05-02 NOTE — TELEPHONE ENCOUNTER
PCP calling trying to get a PT in to have a cyst drained    [phone] Dr. Getachew Perry nurse    Please call nurse back if we can get her in per your

## 2022-05-02 NOTE — TELEPHONE ENCOUNTER
Returned call to Dr. Kenrick Lee' office. Suspected labial cyst. Was drained in [facility] ER but reoccurred and she contacted her PCP.      Assisted with scheduling appt for 5/6/22

## 2022-05-03 ENCOUNTER — HOSPITAL ENCOUNTER (OUTPATIENT)
Dept: LAB | Facility: HOSPITAL | Age: 87
Discharge: HOME | End: 2022-05-03
Attending: INTERNAL MEDICINE
Payer: MEDICARE

## 2022-05-03 DIAGNOSIS — Z79.01 LONG TERM (CURRENT) USE OF ANTICOAGULANTS: ICD-10-CM

## 2022-05-03 LAB — INR BLDC: 2.9 (ref 0.9–1.1)

## 2022-05-03 PROCEDURE — 85610 PROTHROMBIN TIME: CPT

## 2022-05-17 ENCOUNTER — HOSPITAL ENCOUNTER (OUTPATIENT)
Dept: LAB | Facility: HOSPITAL | Age: 87
Discharge: HOME | End: 2022-05-17
Attending: INTERNAL MEDICINE
Payer: MEDICARE

## 2022-05-17 DIAGNOSIS — Z79.01 LONG TERM (CURRENT) USE OF ANTICOAGULANTS: ICD-10-CM

## 2022-05-17 LAB — INR BLDC: 2.4 (ref 0.9–1.1)

## 2022-05-17 PROCEDURE — 85610 PROTHROMBIN TIME: CPT

## 2022-06-10 ENCOUNTER — LABORATORY ENCOUNTER (OUTPATIENT)
Dept: LAB | Facility: HOSPITAL | Age: 87
End: 2022-06-10
Attending: SURGERY
Payer: MEDICARE

## 2022-06-10 ENCOUNTER — LAB ENCOUNTER (OUTPATIENT)
Dept: LAB | Facility: HOSPITAL | Age: 87
End: 2022-06-10
Attending: SURGERY
Payer: MEDICARE

## 2022-06-10 DIAGNOSIS — Z20.822 ENCOUNTER FOR PREOPERATIVE SCREENING LABORATORY TESTING FOR COVID-19 VIRUS: ICD-10-CM

## 2022-06-10 DIAGNOSIS — L72.9 SKIN CYST: ICD-10-CM

## 2022-06-10 DIAGNOSIS — Z01.812 ENCOUNTER FOR PREOPERATIVE SCREENING LABORATORY TESTING FOR COVID-19 VIRUS: ICD-10-CM

## 2022-06-10 LAB
CHLORIDE SERPL-SCNC: 113 MMOL/L (ref 98–112)
CO2 SERPL-SCNC: 28 MMOL/L (ref 21–32)
POTASSIUM SERPL-SCNC: 4.5 MMOL/L (ref 3.5–5.1)
SODIUM SERPL-SCNC: 142 MMOL/L (ref 136–145)

## 2022-06-10 PROCEDURE — 80051 ELECTROLYTE PANEL: CPT

## 2022-06-10 PROCEDURE — 36415 COLL VENOUS BLD VENIPUNCTURE: CPT

## 2022-06-11 LAB — SARS-COV-2 RNA RESP QL NAA+PROBE: NOT DETECTED

## 2022-06-13 ENCOUNTER — HOSPITAL ENCOUNTER (OUTPATIENT)
Facility: HOSPITAL | Age: 87
Setting detail: HOSPITAL OUTPATIENT SURGERY
Discharge: HOME | End: 2022-06-13
Attending: SURGERY | Admitting: SURGERY
Payer: MEDICARE

## 2022-06-13 ENCOUNTER — ANESTHESIA EVENT (OUTPATIENT)
Dept: SURGERY | Facility: HOSPITAL | Age: 87
End: 2022-06-13
Payer: MEDICARE

## 2022-06-13 ENCOUNTER — ANESTHESIA (OUTPATIENT)
Dept: SURGERY | Facility: HOSPITAL | Age: 87
End: 2022-06-13
Payer: MEDICARE

## 2022-06-13 VITALS
RESPIRATION RATE: 16 BRPM | SYSTOLIC BLOOD PRESSURE: 177 MMHG | WEIGHT: 224 LBS | TEMPERATURE: 98 F | HEART RATE: 59 BPM | OXYGEN SATURATION: 100 % | DIASTOLIC BLOOD PRESSURE: 74 MMHG | BODY MASS INDEX: 31.36 KG/M2 | HEIGHT: 71 IN

## 2022-06-13 DIAGNOSIS — L72.9 SKIN CYST: Primary | ICD-10-CM

## 2022-06-13 DIAGNOSIS — Z01.812 ENCOUNTER FOR PREOPERATIVE SCREENING LABORATORY TESTING FOR COVID-19 VIRUS: ICD-10-CM

## 2022-06-13 DIAGNOSIS — I10 PRIMARY HYPERTENSION: ICD-10-CM

## 2022-06-13 DIAGNOSIS — I48.91 ATRIAL FIBRILLATION WITH RVR (HCC): ICD-10-CM

## 2022-06-13 DIAGNOSIS — Z20.822 ENCOUNTER FOR PREOPERATIVE SCREENING LABORATORY TESTING FOR COVID-19 VIRUS: ICD-10-CM

## 2022-06-13 LAB
INR BLD: 1.1 (ref 0.8–1.2)
PROTHROMBIN TIME: 14.2 SECONDS (ref 11.6–14.8)

## 2022-06-13 PROCEDURE — 88304 TISSUE EXAM BY PATHOLOGIST: CPT | Performed by: SURGERY

## 2022-06-13 PROCEDURE — 85610 PROTHROMBIN TIME: CPT

## 2022-06-13 PROCEDURE — 0WJP7ZZ INSPECTION OF GASTROINTESTINAL TRACT, VIA NATURAL OR ARTIFICIAL OPENING APPROACH: ICD-10-PCS | Performed by: SURGERY

## 2022-06-13 PROCEDURE — 0JB90ZZ EXCISION OF BUTTOCK SUBCUTANEOUS TISSUE AND FASCIA, OPEN APPROACH: ICD-10-PCS | Performed by: SURGERY

## 2022-06-13 RX ORDER — HYDROMORPHONE HYDROCHLORIDE 1 MG/ML
0.6 INJECTION, SOLUTION INTRAMUSCULAR; INTRAVENOUS; SUBCUTANEOUS EVERY 5 MIN PRN
Status: DISCONTINUED | OUTPATIENT
Start: 2022-06-13 | End: 2022-06-13

## 2022-06-13 RX ORDER — HYDROCODONE BITARTRATE AND ACETAMINOPHEN 5; 325 MG/1; MG/1
1 TABLET ORAL ONCE AS NEEDED
Status: DISCONTINUED | OUTPATIENT
Start: 2022-06-13 | End: 2022-06-13

## 2022-06-13 RX ORDER — SODIUM CHLORIDE, SODIUM LACTATE, POTASSIUM CHLORIDE, CALCIUM CHLORIDE 600; 310; 30; 20 MG/100ML; MG/100ML; MG/100ML; MG/100ML
INJECTION, SOLUTION INTRAVENOUS CONTINUOUS
Status: DISCONTINUED | OUTPATIENT
Start: 2022-06-13 | End: 2022-06-13

## 2022-06-13 RX ORDER — LIDOCAINE HYDROCHLORIDE 10 MG/ML
INJECTION, SOLUTION EPIDURAL; INFILTRATION; INTRACAUDAL; PERINEURAL AS NEEDED
Status: DISCONTINUED | OUTPATIENT
Start: 2022-06-13 | End: 2022-06-13 | Stop reason: SURG

## 2022-06-13 RX ORDER — ONDANSETRON 2 MG/ML
4 INJECTION INTRAMUSCULAR; INTRAVENOUS EVERY 6 HOURS PRN
Status: DISCONTINUED | OUTPATIENT
Start: 2022-06-13 | End: 2022-06-13

## 2022-06-13 RX ORDER — DEXAMETHASONE SODIUM PHOSPHATE 4 MG/ML
VIAL (ML) INJECTION AS NEEDED
Status: DISCONTINUED | OUTPATIENT
Start: 2022-06-13 | End: 2022-06-13 | Stop reason: SURG

## 2022-06-13 RX ORDER — HYDROCODONE BITARTRATE AND ACETAMINOPHEN 5; 325 MG/1; MG/1
1-2 TABLET ORAL EVERY 6 HOURS PRN
Qty: 30 TABLET | Refills: 0 | Status: SHIPPED | OUTPATIENT
Start: 2022-06-13

## 2022-06-13 RX ORDER — ACETAMINOPHEN 500 MG
1000 TABLET ORAL ONCE
Status: DISCONTINUED | OUTPATIENT
Start: 2022-06-13 | End: 2022-06-13 | Stop reason: HOSPADM

## 2022-06-13 RX ORDER — HYDROCODONE BITARTRATE AND ACETAMINOPHEN 5; 325 MG/1; MG/1
2 TABLET ORAL ONCE AS NEEDED
Status: DISCONTINUED | OUTPATIENT
Start: 2022-06-13 | End: 2022-06-13

## 2022-06-13 RX ORDER — HYDROMORPHONE HYDROCHLORIDE 1 MG/ML
0.2 INJECTION, SOLUTION INTRAMUSCULAR; INTRAVENOUS; SUBCUTANEOUS EVERY 5 MIN PRN
Status: DISCONTINUED | OUTPATIENT
Start: 2022-06-13 | End: 2022-06-13

## 2022-06-13 RX ORDER — ROCURONIUM BROMIDE 10 MG/ML
INJECTION, SOLUTION INTRAVENOUS AS NEEDED
Status: DISCONTINUED | OUTPATIENT
Start: 2022-06-13 | End: 2022-06-13 | Stop reason: SURG

## 2022-06-13 RX ORDER — ACETAMINOPHEN 500 MG
1000 TABLET ORAL ONCE AS NEEDED
Status: DISCONTINUED | OUTPATIENT
Start: 2022-06-13 | End: 2022-06-13

## 2022-06-13 RX ORDER — METOPROLOL TARTRATE 5 MG/5ML
2.5 INJECTION INTRAVENOUS ONCE
Status: DISCONTINUED | OUTPATIENT
Start: 2022-06-13 | End: 2022-06-13

## 2022-06-13 RX ORDER — BUPIVACAINE HYDROCHLORIDE 5 MG/ML
INJECTION, SOLUTION EPIDURAL; INTRACAUDAL AS NEEDED
Status: DISCONTINUED | OUTPATIENT
Start: 2022-06-13 | End: 2022-06-13 | Stop reason: HOSPADM

## 2022-06-13 RX ORDER — GLYCOPYRROLATE 0.2 MG/ML
INJECTION, SOLUTION INTRAMUSCULAR; INTRAVENOUS AS NEEDED
Status: DISCONTINUED | OUTPATIENT
Start: 2022-06-13 | End: 2022-06-13 | Stop reason: SURG

## 2022-06-13 RX ORDER — NALOXONE HYDROCHLORIDE 0.4 MG/ML
80 INJECTION, SOLUTION INTRAMUSCULAR; INTRAVENOUS; SUBCUTANEOUS AS NEEDED
Status: DISCONTINUED | OUTPATIENT
Start: 2022-06-13 | End: 2022-06-13

## 2022-06-13 RX ORDER — LIDOCAINE HYDROCHLORIDE AND EPINEPHRINE 10; 10 MG/ML; UG/ML
INJECTION, SOLUTION INFILTRATION; PERINEURAL AS NEEDED
Status: DISCONTINUED | OUTPATIENT
Start: 2022-06-13 | End: 2022-06-13 | Stop reason: HOSPADM

## 2022-06-13 RX ORDER — METOCLOPRAMIDE HYDROCHLORIDE 5 MG/ML
10 INJECTION INTRAMUSCULAR; INTRAVENOUS EVERY 8 HOURS PRN
Status: DISCONTINUED | OUTPATIENT
Start: 2022-06-13 | End: 2022-06-13

## 2022-06-13 RX ORDER — HYDRALAZINE HYDROCHLORIDE 20 MG/ML
10 INJECTION INTRAMUSCULAR; INTRAVENOUS
Status: DISCONTINUED | OUTPATIENT
Start: 2022-06-13 | End: 2022-06-13

## 2022-06-13 RX ORDER — NEOSTIGMINE METHYLSULFATE 1 MG/ML
INJECTION INTRAVENOUS AS NEEDED
Status: DISCONTINUED | OUTPATIENT
Start: 2022-06-13 | End: 2022-06-13 | Stop reason: SURG

## 2022-06-13 RX ORDER — DIPHENHYDRAMINE HYDROCHLORIDE 50 MG/ML
12.5 INJECTION INTRAMUSCULAR; INTRAVENOUS AS NEEDED
Status: DISCONTINUED | OUTPATIENT
Start: 2022-06-13 | End: 2022-06-13

## 2022-06-13 RX ORDER — HEPARIN SODIUM 5000 [USP'U]/ML
5000 INJECTION, SOLUTION INTRAVENOUS; SUBCUTANEOUS ONCE
Status: COMPLETED | OUTPATIENT
Start: 2022-06-13 | End: 2022-06-13

## 2022-06-13 RX ORDER — METOCLOPRAMIDE HYDROCHLORIDE 5 MG/ML
INJECTION INTRAMUSCULAR; INTRAVENOUS AS NEEDED
Status: DISCONTINUED | OUTPATIENT
Start: 2022-06-13 | End: 2022-06-13 | Stop reason: SURG

## 2022-06-13 RX ORDER — HYDROMORPHONE HYDROCHLORIDE 1 MG/ML
0.4 INJECTION, SOLUTION INTRAMUSCULAR; INTRAVENOUS; SUBCUTANEOUS EVERY 5 MIN PRN
Status: DISCONTINUED | OUTPATIENT
Start: 2022-06-13 | End: 2022-06-13

## 2022-06-13 RX ORDER — MIDAZOLAM HYDROCHLORIDE 1 MG/ML
1 INJECTION INTRAMUSCULAR; INTRAVENOUS EVERY 5 MIN PRN
Status: DISCONTINUED | OUTPATIENT
Start: 2022-06-13 | End: 2022-06-13

## 2022-06-13 RX ADMIN — METOCLOPRAMIDE HYDROCHLORIDE 10 MG: 5 INJECTION INTRAMUSCULAR; INTRAVENOUS at 18:48:00

## 2022-06-13 RX ADMIN — DEXAMETHASONE SODIUM PHOSPHATE 4 MG: 4 MG/ML VIAL (ML) INJECTION at 19:19:00

## 2022-06-13 RX ADMIN — ROCURONIUM BROMIDE 30 MG: 10 INJECTION, SOLUTION INTRAVENOUS at 18:25:00

## 2022-06-13 RX ADMIN — GLYCOPYRROLATE 0.6 MG: 0.2 INJECTION, SOLUTION INTRAMUSCULAR; INTRAVENOUS at 18:57:00

## 2022-06-13 RX ADMIN — LIDOCAINE HYDROCHLORIDE 50 MG: 10 INJECTION, SOLUTION EPIDURAL; INFILTRATION; INTRACAUDAL; PERINEURAL at 18:24:00

## 2022-06-13 RX ADMIN — NEOSTIGMINE METHYLSULFATE 3 MG: 1 INJECTION INTRAVENOUS at 18:57:00

## 2022-06-13 RX ADMIN — SODIUM CHLORIDE, SODIUM LACTATE, POTASSIUM CHLORIDE, CALCIUM CHLORIDE: 600; 310; 30; 20 INJECTION, SOLUTION INTRAVENOUS at 19:19:00

## 2022-06-13 NOTE — BRIEF OP NOTE
Pre-Operative Diagnosis: CHOLECYSTITIS     Post-Operative Diagnosis: CHOLECYSTITIS      Procedure Performed:   EXCISION SKIN CYST RIGHT GLUTEAL SKIN, ANAL EXAMINATION UNDER ANESTHESIA    Surgeon(s) and Role:     Juan Diego Lopez MD - Primary    Assistant(s):        Surgical Findings: see dictation     Specimen: cyst     Estimated Blood Loss: No data recorded        Mary Jo Stein MD  6/13/2022  6:59 PM

## 2022-06-13 NOTE — ANESTHESIA PROCEDURE NOTES
Airway  Date/Time: 6/13/2022 6:28 PM  Urgency: elective      General Information and Staff    Patient location during procedure: OR  Anesthesiologist: Evelin Tang MD  Performed: anesthesiologist     Indications and Patient Condition  Indications for airway management: anesthesia  Sedation level: deep  Preoxygenated: yes  Patient position: sniffing  Mask difficulty assessment: 1 - vent by mask    Final Airway Details  Final airway type: endotracheal airway      Successful airway: ETT  Cuffed: yes   Successful intubation technique: direct laryngoscopy  Endotracheal tube insertion site: oral  Blade: Mark  Blade size: #3  ETT size (mm): 7.5    Cormack-Lehane Classification: grade I - full view of glottis  Placement verified by: chest auscultation and capnometry   Measured from: lips  ETT to lips (cm): 21  Number of attempts at approach: 1    Additional Comments   OETT placed without airway or dental trauma.

## 2022-06-14 NOTE — ANESTHESIA POSTPROCEDURE EVALUATION
[address] [facility] Patient Status:  Hospital Outpatient Surgery   Age/Gender 80year old female MRN [id number]   [facility] SURGERY Attending Paulette Coyne MD   Hosp Day # 0 PCP Kai Valencia MD       Anesthesia Post-op Note    EXCISION SKIN CYST RIGHT GLUTEAL SKIN, ANAL EXAMINATION UNDER ANESTHESIA    Procedure Summary     Date: 06/13/22 Room / Location: [facility] OR 08 / 1404 [facility] OR    Anesthesia Start: 8809 Anesthesia Stop: 1917    Procedure: EXCISION SKIN CYST RIGHT GLUTEAL SKIN, ANAL EXAMINATION UNDER ANESTHESIA (Right Buttocks) Diagnosis: (CHOLECYSTITIS)    Surgeons: Paulette Coyne MD Anesthesiologist: Landon Fox MD    Anesthesia Type: general ASA Status: 3          Anesthesia Type: general    Vitals Value Taken Time   /73 06/13/22 1917   Temp 97.2 06/13/22 1919   Pulse 66 06/13/22 1919   Resp 14 06/13/22 1919   SpO2 97 % 06/13/22 1919   Vitals shown include unvalidated device data. Patient Location: PACU    Anesthesia Type: general    Airway Patency: patent    Postop Pain Control: adequate    Mental Status: mildly sedated but able to meaningfully participate in the post-anesthesia evaluation    Nausea/Vomiting: none    Cardiopulmonary/Hydration status: stable euvolemic    Complications: no apparent anesthesia related complications    Postop vital signs: stable    Comments: The endotracheal airway was removed in the procedure area. Cable monitors were removed, and the patient was transported with observation to the recovery area personally with the OR team.  Report to [address]. The patient was responsive in a meaningful way and demonstrated a good airway. PACU monitors were then applied with device connection to Epic. Full report signout, including report, identifications, history, procedure, anesthesia course, recovery expectations with chance for questions was provided to a responsible recovery RN.         Dental Exam: Unchanged from Preop    Patient to be discharged from PACU when criteria met.

## 2022-06-14 NOTE — OPERATIVE REPORT
[facility]    PATIENT'S NAME: Anaya Fernandes   ATTENDING PHYSICIAN: Eduardo Cotton M.D. OPERATING PHYSICIAN: Eduardo Cotton M.D. PATIENT ACCOUNT#:   [de-identified]    LOCATION:  PACU [address] [place], [place] [address]U 5 [facility] 10  MEDICAL RECORD #:   [id number]       YOB: 1934  ADMISSION DATE:       06/13/2022      OPERATION DATE:  06/13/2022    OPERATIVE REPORT    PREOPERATIVE DIAGNOSIS:  Right gluteal skin cyst versus fistula. POSTOPERATIVE DIAGNOSIS:  Right gluteal skin cyst.    PROCEDURE:  Anal exam under anesthesia, excision of skin cyst right gluteal skin, excised diameter 1.5 cm, with a 3 cm intermediate layered closure. ANESTHESIA:  General.    ESTIMATED BLOOD LOSS:  1 mL. OPERATIVE TECHNIQUE:  The patient was taken to the operating suite at [facility].  After informed consent and proper identification, administered a general anesthetic and positioned in the prone colt-knife position. The patient's perirectal region was taped, prepped and draped in the usual sterile fashion. The patient had a small, indurated area in the right gluteal skin as documented in the office. There was a small opening in this location. This was probed with a fistula probe and did not extend in toward the anal region. Anal exam revealed no evidence of anal fistula. There was noted to be some mixed hemorrhoids. In light of this not communicating with the anus and not a true anal fistula, I elected to excise this. An elliptical skin incision was planned with a marking pen. The area was then infiltrated with local anesthesia. Elliptical skin incision was performed with a #15 scalpel. Dissection proceeded through the subcutaneous tissue. Excision of this was performed. Hemostasis was achieved of the subcutaneous tissues. The subcutaneous tissues were approximated with 3-0 Vicryl. The skin was closed with 4-0 Vicryl in a subcuticular fashion. Dermabond was placed directly over the incision.      Dictated By Alexy Aburto Loretta Lyons M.D.  d: 06/13/2022 19:02:55  t: 06/14/2022 04:21:36  [facility] [id number]  /

## 2022-06-16 ENCOUNTER — HOSPITAL ENCOUNTER (OUTPATIENT)
Dept: LAB | Facility: HOSPITAL | Age: 87
Discharge: HOME | End: 2022-06-16
Attending: INTERNAL MEDICINE
Payer: MEDICARE

## 2022-06-16 DIAGNOSIS — Z79.01 LONG TERM (CURRENT) USE OF ANTICOAGULANTS: ICD-10-CM

## 2022-06-16 LAB — INR BLDC: 1.2 (ref 0.9–1.1)

## 2022-06-16 PROCEDURE — 85610 PROTHROMBIN TIME: CPT

## 2022-06-20 ENCOUNTER — HOSPITAL ENCOUNTER (OUTPATIENT)
Dept: LAB | Facility: HOSPITAL | Age: 87
Discharge: HOME | End: 2022-06-20
Attending: INTERNAL MEDICINE
Payer: MEDICARE

## 2022-06-20 DIAGNOSIS — Z79.01 LONG TERM (CURRENT) USE OF ANTICOAGULANTS: ICD-10-CM

## 2022-06-20 LAB — INR BLDC: 1.9 (ref 0.9–1.1)

## 2022-06-20 PROCEDURE — 85610 PROTHROMBIN TIME: CPT

## 2022-06-27 ENCOUNTER — HOSPITAL ENCOUNTER (OUTPATIENT)
Dept: LAB | Facility: HOSPITAL | Age: 87
Discharge: HOME | End: 2022-06-27
Attending: INTERNAL MEDICINE
Payer: MEDICARE

## 2022-06-27 DIAGNOSIS — Z79.01 LONG TERM (CURRENT) USE OF ANTICOAGULANTS: ICD-10-CM

## 2022-06-27 LAB — INR BLDC: 1.7 (ref 0.9–1.1)

## 2022-06-27 PROCEDURE — 85610 PROTHROMBIN TIME: CPT

## 2022-07-05 ENCOUNTER — HOSPITAL ENCOUNTER (OUTPATIENT)
Dept: LAB | Facility: HOSPITAL | Age: 87
Discharge: HOME | End: 2022-07-05
Attending: INTERNAL MEDICINE
Payer: MEDICARE

## 2022-07-05 DIAGNOSIS — Z79.01 LONG TERM (CURRENT) USE OF ANTICOAGULANTS: ICD-10-CM

## 2022-07-05 LAB — INR BLDC: 3 (ref 0.9–1.1)

## 2022-07-05 PROCEDURE — 85610 PROTHROMBIN TIME: CPT

## 2022-07-06 ENCOUNTER — HOSPITAL ENCOUNTER (OUTPATIENT)
Dept: MAMMOGRAPHY | Facility: HOSPITAL | Age: 87
Discharge: HOME | End: 2022-07-06
Attending: FAMILY MEDICINE
Payer: MEDICARE

## 2022-07-06 DIAGNOSIS — R92.8 ABNORMAL MAMMOGRAM: ICD-10-CM

## 2022-07-06 PROCEDURE — 76642 ULTRASOUND BREAST LIMITED: CPT | Performed by: FAMILY MEDICINE

## 2022-07-14 ENCOUNTER — OFFICE VISIT (OUTPATIENT)
Dept: PODIATRY CLINIC | Facility: CLINIC | Age: 87
End: 2022-07-14
Payer: MEDICARE

## 2022-07-14 DIAGNOSIS — M79.674 PAIN IN TOES OF BOTH FEET: ICD-10-CM

## 2022-07-14 DIAGNOSIS — N18.30 STAGE 3 CHRONIC KIDNEY DISEASE, UNSPECIFIED WHETHER STAGE 3A OR 3B CKD (HCC): Primary | ICD-10-CM

## 2022-07-14 DIAGNOSIS — M20.42 HAMMER TOES OF BOTH FEET: ICD-10-CM

## 2022-07-14 DIAGNOSIS — L84 HELOMA DURUM: ICD-10-CM

## 2022-07-14 DIAGNOSIS — L60.0 INGROWING TOENAIL: ICD-10-CM

## 2022-07-14 DIAGNOSIS — B35.1 ONYCHOMYCOSIS: ICD-10-CM

## 2022-07-14 DIAGNOSIS — M79.675 PAIN IN TOES OF BOTH FEET: ICD-10-CM

## 2022-07-14 DIAGNOSIS — M20.41 HAMMER TOES OF BOTH FEET: ICD-10-CM

## 2022-07-14 DIAGNOSIS — M79.672 LEFT FOOT PAIN: ICD-10-CM

## 2022-07-14 DIAGNOSIS — R60.0 EDEMA OF LEFT FOOT: ICD-10-CM

## 2022-07-14 PROCEDURE — 99213 OFFICE O/P EST LOW 20 MIN: CPT | Performed by: PODIATRIST

## 2022-07-19 ENCOUNTER — HOSPITAL ENCOUNTER (OUTPATIENT)
Dept: LAB | Facility: HOSPITAL | Age: 87
Discharge: HOME | End: 2022-07-19
Attending: INTERNAL MEDICINE
Payer: MEDICARE

## 2022-07-19 DIAGNOSIS — Z79.01 LONG TERM (CURRENT) USE OF ANTICOAGULANTS: ICD-10-CM

## 2022-07-19 LAB — INR BLDC: 2.2 (ref 0.9–1.1)

## 2022-07-19 PROCEDURE — 85610 PROTHROMBIN TIME: CPT

## 2022-07-27 NOTE — LETTER
[facility] Testing Department  Phone: [phone]  Right Fax: [phone]  [facility] James Davis RN Date: 6/1/18    Patient Name: Asha Fruit  Surgery Date: 6/13/2018    CSN: [id number]  Medical Record: [id number]   DO Location Indication Override (Is Already Calculated Based On Selected Body Location): Area M

## 2022-08-09 ENCOUNTER — HOSPITAL ENCOUNTER (OUTPATIENT)
Dept: LAB | Facility: HOSPITAL | Age: 87
Discharge: HOME | End: 2022-08-09
Attending: INTERNAL MEDICINE
Payer: MEDICARE

## 2022-08-09 DIAGNOSIS — I82.90 VENOUS THROMBOEMBOLISM: ICD-10-CM

## 2022-08-09 DIAGNOSIS — I48.19 PERSISTENT ATRIAL FIBRILLATION (HCC): ICD-10-CM

## 2022-08-09 LAB — INR BLDC: 2.7 (ref 0.9–1.1)

## 2022-08-09 PROCEDURE — 85610 PROTHROMBIN TIME: CPT | Performed by: INTERNAL MEDICINE

## 2022-09-06 ENCOUNTER — HOSPITAL ENCOUNTER (OUTPATIENT)
Dept: LAB | Facility: HOSPITAL | Age: 87
Discharge: HOME | End: 2022-09-06
Attending: INTERNAL MEDICINE
Payer: MEDICARE

## 2022-09-06 DIAGNOSIS — I82.90 VENOUS THROMBOEMBOLISM: ICD-10-CM

## 2022-09-06 DIAGNOSIS — I48.19 PERSISTENT ATRIAL FIBRILLATION (HCC): ICD-10-CM

## 2022-09-06 LAB — INR BLDC: 2.4 (ref 0.9–1.1)

## 2022-09-06 PROCEDURE — 85610 PROTHROMBIN TIME: CPT | Performed by: INTERNAL MEDICINE

## 2022-09-13 ENCOUNTER — OFFICE VISIT (OUTPATIENT)
Dept: PODIATRY CLINIC | Facility: CLINIC | Age: 87
End: 2022-09-13
Payer: MEDICARE

## 2022-09-13 DIAGNOSIS — M20.42 HAMMER TOES OF BOTH FEET: ICD-10-CM

## 2022-09-13 DIAGNOSIS — L84 HELOMA MOLLE: ICD-10-CM

## 2022-09-13 DIAGNOSIS — M20.41 HAMMER TOES OF BOTH FEET: ICD-10-CM

## 2022-09-13 DIAGNOSIS — L60.0 INGROWING TOENAIL: ICD-10-CM

## 2022-09-13 DIAGNOSIS — N18.30 STAGE 3 CHRONIC KIDNEY DISEASE, UNSPECIFIED WHETHER STAGE 3A OR 3B CKD (HCC): ICD-10-CM

## 2022-09-13 DIAGNOSIS — M79.674 PAIN IN TOES OF BOTH FEET: Primary | ICD-10-CM

## 2022-09-13 DIAGNOSIS — M79.675 PAIN IN TOES OF BOTH FEET: Primary | ICD-10-CM

## 2022-09-13 DIAGNOSIS — L84 HELOMA DURUM: ICD-10-CM

## 2022-09-13 PROCEDURE — 99213 OFFICE O/P EST LOW 20 MIN: CPT | Performed by: PODIATRIST

## 2022-10-04 ENCOUNTER — HOSPITAL ENCOUNTER (OUTPATIENT)
Dept: LAB | Facility: HOSPITAL | Age: 87
Discharge: HOME | End: 2022-10-04
Attending: INTERNAL MEDICINE
Payer: MEDICARE

## 2022-10-04 DIAGNOSIS — I82.90 VENOUS THROMBOEMBOLISM: ICD-10-CM

## 2022-10-04 DIAGNOSIS — I48.19 PERSISTENT ATRIAL FIBRILLATION (HCC): ICD-10-CM

## 2022-10-04 LAB — INR BLDC: 2.8 (ref 0.9–1.1)

## 2022-10-04 PROCEDURE — 85610 PROTHROMBIN TIME: CPT | Performed by: INTERNAL MEDICINE

## 2022-10-13 ENCOUNTER — OFFICE VISIT (OUTPATIENT)
Dept: PODIATRY CLINIC | Facility: CLINIC | Age: 87
End: 2022-10-13
Payer: MEDICARE

## 2022-10-13 VITALS — SYSTOLIC BLOOD PRESSURE: 138 MMHG | DIASTOLIC BLOOD PRESSURE: 66 MMHG

## 2022-10-13 DIAGNOSIS — G57.81 NEUROMA OF THIRD INTERSPACE OF RIGHT FOOT: Primary | ICD-10-CM

## 2022-10-13 DIAGNOSIS — G57.61 MORTON'S METATARSALGIA, NEURALGIA, OR NEUROMA, RIGHT: ICD-10-CM

## 2022-10-13 RX ORDER — TRIAMCINOLONE ACETONIDE 40 MG/ML
20 INJECTION, SUSPENSION INTRA-ARTICULAR; INTRAMUSCULAR ONCE
Status: COMPLETED | OUTPATIENT
Start: 2022-10-13 | End: 2022-10-13

## 2022-10-13 NOTE — PROGRESS NOTES
Per Dr. Nona Lee draw up 0.5ml of 0.5% Marcaine and 0.5ml of Kenalog 40 for injection to right foot.

## 2022-10-17 ENCOUNTER — LAB ENCOUNTER (OUTPATIENT)
Dept: LAB | Age: 87
End: 2022-10-17
Attending: FAMILY MEDICINE
Payer: MEDICARE

## 2022-10-17 DIAGNOSIS — I10 ESSENTIAL HYPERTENSION, MALIGNANT: ICD-10-CM

## 2022-10-17 DIAGNOSIS — I48.91 ATRIAL FIBRILLATION (HCC): Primary | ICD-10-CM

## 2022-10-17 DIAGNOSIS — Z79.01 LONG TERM (CURRENT) USE OF ANTICOAGULANTS: ICD-10-CM

## 2022-10-17 DIAGNOSIS — I12.9 RENAL HYPERTENSION: ICD-10-CM

## 2022-10-17 DIAGNOSIS — Z79.899 NEED FOR PROPHYLACTIC CHEMOTHERAPY: ICD-10-CM

## 2022-10-17 DIAGNOSIS — M85.88 MASS OF HARD PALATE: ICD-10-CM

## 2022-10-17 LAB
ALBUMIN SERPL-MCNC: 4 G/DL (ref 3.4–5)
ALBUMIN/GLOB SERPL: 1.4 {RATIO} (ref 1–2)
ALP LIVER SERPL-CCNC: 73 U/L
ALT SERPL-CCNC: 20 U/L
ANION GAP SERPL CALC-SCNC: 6 MMOL/L (ref 0–18)
AST SERPL-CCNC: 10 U/L (ref 15–37)
BASOPHILS # BLD AUTO: 0.01 X10(3) UL (ref 0–0.2)
BASOPHILS NFR BLD AUTO: 0.1 %
BILIRUB SERPL-MCNC: 0.8 MG/DL (ref 0.1–2)
BUN BLD-MCNC: 28 MG/DL (ref 7–18)
CALCIUM BLD-MCNC: 9.6 MG/DL (ref 8.5–10.1)
CHLORIDE SERPL-SCNC: 105 MMOL/L (ref 98–112)
CO2 SERPL-SCNC: 29 MMOL/L (ref 21–32)
CREAT BLD-MCNC: 1.3 MG/DL
EOSINOPHIL # BLD AUTO: 0.14 X10(3) UL (ref 0–0.7)
EOSINOPHIL NFR BLD AUTO: 2.1 %
ERYTHROCYTE [DISTWIDTH] IN BLOOD BY AUTOMATED COUNT: 13.2 %
FASTING STATUS PATIENT QL REPORTED: YES
GFR SERPLBLD BASED ON 1.73 SQ M-ARVRAT: 40 ML/MIN/1.73M2 (ref 60–?)
GLOBULIN PLAS-MCNC: 2.9 G/DL (ref 2.8–4.4)
GLUCOSE BLD-MCNC: 115 MG/DL (ref 70–99)
HCT VFR BLD AUTO: 36.7 %
HGB BLD-MCNC: 11.5 G/DL
IMM GRANULOCYTES # BLD AUTO: 0.02 X10(3) UL (ref 0–1)
IMM GRANULOCYTES NFR BLD: 0.3 %
LYMPHOCYTES # BLD AUTO: 0.91 X10(3) UL (ref 1–4)
LYMPHOCYTES NFR BLD AUTO: 13.6 %
MAGNESIUM SERPL-MCNC: 2.1 MG/DL (ref 1.6–2.6)
MCH RBC QN AUTO: 30.9 PG (ref 26–34)
MCHC RBC AUTO-ENTMCNC: 31.3 G/DL (ref 31–37)
MCV RBC AUTO: 98.7 FL
MONOCYTES # BLD AUTO: 0.66 X10(3) UL (ref 0.1–1)
MONOCYTES NFR BLD AUTO: 9.9 %
NEUTROPHILS # BLD AUTO: 4.94 X10 (3) UL (ref 1.5–7.7)
NEUTROPHILS # BLD AUTO: 4.94 X10(3) UL (ref 1.5–7.7)
NEUTROPHILS NFR BLD AUTO: 74 %
OSMOLALITY SERPL CALC.SUM OF ELEC: 296 MOSM/KG (ref 275–295)
PLATELET # BLD AUTO: 190 10(3)UL (ref 150–450)
POTASSIUM SERPL-SCNC: 4.1 MMOL/L (ref 3.5–5.1)
PROT SERPL-MCNC: 6.9 G/DL (ref 6.4–8.2)
RBC # BLD AUTO: 3.72 X10(6)UL
SODIUM SERPL-SCNC: 140 MMOL/L (ref 136–145)
WBC # BLD AUTO: 6.7 X10(3) UL (ref 4–11)

## 2022-10-17 PROCEDURE — 36415 COLL VENOUS BLD VENIPUNCTURE: CPT

## 2022-10-17 PROCEDURE — 85025 COMPLETE CBC W/AUTO DIFF WBC: CPT

## 2022-10-17 PROCEDURE — 80053 COMPREHEN METABOLIC PANEL: CPT

## 2022-10-17 PROCEDURE — 83735 ASSAY OF MAGNESIUM: CPT

## 2022-11-01 ENCOUNTER — HOSPITAL ENCOUNTER (OUTPATIENT)
Dept: LAB | Facility: HOSPITAL | Age: 87
Discharge: HOME | End: 2022-11-01
Attending: INTERNAL MEDICINE
Payer: MEDICARE

## 2022-11-01 DIAGNOSIS — I48.19 PERSISTENT ATRIAL FIBRILLATION (HCC): ICD-10-CM

## 2022-11-01 DIAGNOSIS — I82.90 VENOUS THROMBOEMBOLISM: ICD-10-CM

## 2022-11-01 LAB — INR BLDC: 2.6 (ref 0.9–1.1)

## 2022-11-01 PROCEDURE — 85610 PROTHROMBIN TIME: CPT | Performed by: INTERNAL MEDICINE

## 2022-11-03 ENCOUNTER — OFFICE VISIT (OUTPATIENT)
Dept: PODIATRY CLINIC | Facility: CLINIC | Age: 87
End: 2022-11-03
Payer: MEDICARE

## 2022-11-03 DIAGNOSIS — G57.81 NEUROMA OF THIRD INTERSPACE OF RIGHT FOOT: ICD-10-CM

## 2022-11-03 DIAGNOSIS — M20.42 HAMMER TOES OF BOTH FEET: ICD-10-CM

## 2022-11-03 DIAGNOSIS — M20.41 HAMMER TOES OF BOTH FEET: ICD-10-CM

## 2022-11-03 DIAGNOSIS — G57.61 MORTON'S METATARSALGIA, NEURALGIA, OR NEUROMA, RIGHT: Primary | ICD-10-CM

## 2022-11-03 DIAGNOSIS — L84 HELOMA DURUM: ICD-10-CM

## 2022-11-03 PROCEDURE — 99213 OFFICE O/P EST LOW 20 MIN: CPT | Performed by: PODIATRIST

## 2022-11-14 ENCOUNTER — LAB ENCOUNTER (OUTPATIENT)
Dept: LAB | Age: 87
End: 2022-11-14
Attending: FAMILY MEDICINE
Payer: MEDICARE

## 2022-11-14 DIAGNOSIS — Z71.89 OTHER PARENT-CHILD PROBLEMS: ICD-10-CM

## 2022-11-14 DIAGNOSIS — I12.9 RENAL HYPERTENSION: ICD-10-CM

## 2022-11-14 DIAGNOSIS — N17.9 ACUTE RENAL FAILURE SYNDROME (HCC): Primary | ICD-10-CM

## 2022-11-14 DIAGNOSIS — Z23 NEED FOR INFLUENZA VACCINATION: ICD-10-CM

## 2022-11-14 DIAGNOSIS — I10 HYPERTENSION: ICD-10-CM

## 2022-11-14 DIAGNOSIS — Z62.820 OTHER PARENT-CHILD PROBLEMS: ICD-10-CM

## 2022-11-14 DIAGNOSIS — Z79.899 NEED FOR PROPHYLACTIC CHEMOTHERAPY: ICD-10-CM

## 2022-11-14 DIAGNOSIS — D64.9 ANEMIA: ICD-10-CM

## 2022-11-14 LAB
ALBUMIN SERPL-MCNC: 3.9 G/DL (ref 3.4–5)
ALBUMIN/GLOB SERPL: 1.6 {RATIO} (ref 1–2)
ALP LIVER SERPL-CCNC: 77 U/L
ALT SERPL-CCNC: 24 U/L
ANION GAP SERPL CALC-SCNC: 5 MMOL/L (ref 0–18)
AST SERPL-CCNC: 11 U/L (ref 15–37)
BASOPHILS # BLD AUTO: 0.01 X10(3) UL (ref 0–0.2)
BASOPHILS NFR BLD AUTO: 0.3 %
BILIRUB SERPL-MCNC: 0.9 MG/DL (ref 0.1–2)
BUN BLD-MCNC: 23 MG/DL (ref 7–18)
CALCIUM BLD-MCNC: 9.7 MG/DL (ref 8.5–10.1)
CHLORIDE SERPL-SCNC: 105 MMOL/L (ref 98–112)
CO2 SERPL-SCNC: 30 MMOL/L (ref 21–32)
CREAT BLD-MCNC: 1.3 MG/DL
DEPRECATED HBV CORE AB SER IA-ACNC: 394.6 NG/ML
EOSINOPHIL # BLD AUTO: 0.08 X10(3) UL (ref 0–0.7)
EOSINOPHIL NFR BLD AUTO: 2 %
ERYTHROCYTE [DISTWIDTH] IN BLOOD BY AUTOMATED COUNT: 13.5 %
FASTING STATUS PATIENT QL REPORTED: NO
FOLATE SERPL-MCNC: 17.5 NG/ML (ref 8.7–?)
GFR SERPLBLD BASED ON 1.73 SQ M-ARVRAT: 40 ML/MIN/1.73M2 (ref 60–?)
GLOBULIN PLAS-MCNC: 2.5 G/DL (ref 2.8–4.4)
GLUCOSE BLD-MCNC: 89 MG/DL (ref 70–99)
HCT VFR BLD AUTO: 35.2 %
HGB BLD-MCNC: 11 G/DL
IMM GRANULOCYTES # BLD AUTO: 0.01 X10(3) UL (ref 0–1)
IMM GRANULOCYTES NFR BLD: 0.3 %
LYMPHOCYTES # BLD AUTO: 1.22 X10(3) UL (ref 1–4)
LYMPHOCYTES NFR BLD AUTO: 30.5 %
MAGNESIUM SERPL-MCNC: 2.3 MG/DL (ref 1.6–2.6)
MCH RBC QN AUTO: 29.8 PG (ref 26–34)
MCHC RBC AUTO-ENTMCNC: 31.3 G/DL (ref 31–37)
MCV RBC AUTO: 95.4 FL
MONOCYTES # BLD AUTO: 0.46 X10(3) UL (ref 0.1–1)
MONOCYTES NFR BLD AUTO: 11.5 %
NEUTROPHILS # BLD AUTO: 2.22 X10 (3) UL (ref 1.5–7.7)
NEUTROPHILS # BLD AUTO: 2.22 X10(3) UL (ref 1.5–7.7)
NEUTROPHILS NFR BLD AUTO: 55.4 %
OSMOLALITY SERPL CALC.SUM OF ELEC: 293 MOSM/KG (ref 275–295)
PLATELET # BLD AUTO: 152 10(3)UL (ref 150–450)
POTASSIUM SERPL-SCNC: 4.1 MMOL/L (ref 3.5–5.1)
PROT SERPL-MCNC: 6.4 G/DL (ref 6.4–8.2)
RBC # BLD AUTO: 3.69 X10(6)UL
SODIUM SERPL-SCNC: 140 MMOL/L (ref 136–145)
VIT B12 SERPL-MCNC: 1901 PG/ML (ref 193–986)
WBC # BLD AUTO: 4 X10(3) UL (ref 4–11)

## 2022-11-14 PROCEDURE — 85025 COMPLETE CBC W/AUTO DIFF WBC: CPT

## 2022-11-14 PROCEDURE — 80053 COMPREHEN METABOLIC PANEL: CPT

## 2022-11-14 PROCEDURE — 83735 ASSAY OF MAGNESIUM: CPT

## 2022-11-14 PROCEDURE — 82607 VITAMIN B-12: CPT

## 2022-11-14 PROCEDURE — 36415 COLL VENOUS BLD VENIPUNCTURE: CPT

## 2022-11-14 PROCEDURE — 82728 ASSAY OF FERRITIN: CPT

## 2022-11-14 PROCEDURE — 82746 ASSAY OF FOLIC ACID SERUM: CPT

## 2022-11-29 ENCOUNTER — HOSPITAL ENCOUNTER (OUTPATIENT)
Dept: LAB | Facility: HOSPITAL | Age: 87
Discharge: HOME | End: 2022-11-29
Attending: INTERNAL MEDICINE
Payer: MEDICARE

## 2022-11-29 DIAGNOSIS — I48.19 PERSISTENT ATRIAL FIBRILLATION (HCC): ICD-10-CM

## 2022-11-29 DIAGNOSIS — I82.90 VENOUS THROMBOEMBOLISM: ICD-10-CM

## 2022-11-29 LAB — INR BLDC: 2.6 (ref 0.9–1.1)

## 2022-11-29 PROCEDURE — 85610 PROTHROMBIN TIME: CPT | Performed by: INTERNAL MEDICINE

## 2022-12-14 ENCOUNTER — HOSPITAL ENCOUNTER (OUTPATIENT)
Dept: ULTRASOUND IMAGING | Age: 87
Discharge: HOME | End: 2022-12-14
Attending: FAMILY MEDICINE
Payer: MEDICARE

## 2022-12-14 ENCOUNTER — LAB ENCOUNTER (OUTPATIENT)
Dept: LAB | Age: 87
End: 2022-12-14
Attending: FAMILY MEDICINE
Payer: MEDICARE

## 2022-12-14 DIAGNOSIS — E78.00 HYPERCHOLESTEROLEMIA: ICD-10-CM

## 2022-12-14 DIAGNOSIS — I12.9 RENAL HYPERTENSION: ICD-10-CM

## 2022-12-14 DIAGNOSIS — D64.9 ANEMIA, UNSPECIFIED: Primary | ICD-10-CM

## 2022-12-14 DIAGNOSIS — I10 HYPERTENSION: ICD-10-CM

## 2022-12-14 DIAGNOSIS — R55 POSTURAL DIZZINESS WITH PRESYNCOPE: ICD-10-CM

## 2022-12-14 DIAGNOSIS — I48.91 ATRIAL FIBRILLATION (HCC): ICD-10-CM

## 2022-12-14 DIAGNOSIS — R42 POSTURAL DIZZINESS WITH PRESYNCOPE: ICD-10-CM

## 2022-12-14 DIAGNOSIS — I65.29 CAROTID ATHEROSCLEROSIS: ICD-10-CM

## 2022-12-14 DIAGNOSIS — I70.8 BLOCKAGE OF SUBCLAVIAN ARTERY: ICD-10-CM

## 2022-12-14 DIAGNOSIS — Z79.899 NEED FOR PROPHYLACTIC CHEMOTHERAPY: ICD-10-CM

## 2022-12-14 DIAGNOSIS — I10 ESSENTIAL HYPERTENSION, MALIGNANT: ICD-10-CM

## 2022-12-14 DIAGNOSIS — E78.00 PURE HYPERCHOLESTEROLEMIA: ICD-10-CM

## 2022-12-14 LAB
ANION GAP SERPL CALC-SCNC: 6 MMOL/L (ref 0–18)
BASOPHILS # BLD AUTO: 0.02 X10(3) UL (ref 0–0.2)
BASOPHILS NFR BLD AUTO: 0.5 %
BUN BLD-MCNC: 23 MG/DL (ref 7–18)
CALCIUM BLD-MCNC: 9.7 MG/DL (ref 8.5–10.1)
CHLORIDE SERPL-SCNC: 108 MMOL/L (ref 98–112)
CO2 SERPL-SCNC: 28 MMOL/L (ref 21–32)
CREAT BLD-MCNC: 1.16 MG/DL
EOSINOPHIL # BLD AUTO: 0.1 X10(3) UL (ref 0–0.7)
EOSINOPHIL NFR BLD AUTO: 2.5 %
ERYTHROCYTE [DISTWIDTH] IN BLOOD BY AUTOMATED COUNT: 14 %
FASTING STATUS PATIENT QL REPORTED: NO
GFR SERPLBLD BASED ON 1.73 SQ M-ARVRAT: 45 ML/MIN/1.73M2 (ref 60–?)
GLUCOSE BLD-MCNC: 103 MG/DL (ref 70–99)
HCT VFR BLD AUTO: 36.1 %
HGB BLD-MCNC: 11.2 G/DL
HGB RETIC QN AUTO: 35.2 PG (ref 28.2–36.6)
IMM GRANULOCYTES # BLD AUTO: 0.01 X10(3) UL (ref 0–1)
IMM GRANULOCYTES NFR BLD: 0.2 %
IMM RETICS NFR: 0.12 RATIO (ref 0.1–0.3)
LYMPHOCYTES # BLD AUTO: 1.08 X10(3) UL (ref 1–4)
LYMPHOCYTES NFR BLD AUTO: 26.5 %
MCH RBC QN AUTO: 30.1 PG (ref 26–34)
MCHC RBC AUTO-ENTMCNC: 31 G/DL (ref 31–37)
MCV RBC AUTO: 97 FL
MONOCYTES # BLD AUTO: 0.37 X10(3) UL (ref 0.1–1)
MONOCYTES NFR BLD AUTO: 9.1 %
NEUTROPHILS # BLD AUTO: 2.49 X10 (3) UL (ref 1.5–7.7)
NEUTROPHILS # BLD AUTO: 2.49 X10(3) UL (ref 1.5–7.7)
NEUTROPHILS NFR BLD AUTO: 61.2 %
OSMOLALITY SERPL CALC.SUM OF ELEC: 298 MOSM/KG (ref 275–295)
PLATELET # BLD AUTO: 142 10(3)UL (ref 150–450)
POTASSIUM SERPL-SCNC: 4.2 MMOL/L (ref 3.5–5.1)
RBC # BLD AUTO: 3.72 X10(6)UL
RETICS # AUTO: 51.3 X10(3) UL (ref 22.5–147.5)
RETICS/RBC NFR AUTO: 1.4 %
SODIUM SERPL-SCNC: 142 MMOL/L (ref 136–145)
WBC # BLD AUTO: 4.1 X10(3) UL (ref 4–11)

## 2022-12-14 PROCEDURE — 80048 BASIC METABOLIC PNL TOTAL CA: CPT

## 2022-12-14 PROCEDURE — 93880 EXTRACRANIAL BILAT STUDY: CPT | Performed by: FAMILY MEDICINE

## 2022-12-14 PROCEDURE — 85045 AUTOMATED RETICULOCYTE COUNT: CPT

## 2022-12-14 PROCEDURE — 36415 COLL VENOUS BLD VENIPUNCTURE: CPT

## 2022-12-14 PROCEDURE — 85025 COMPLETE CBC W/AUTO DIFF WBC: CPT

## 2022-12-27 ENCOUNTER — HOSPITAL ENCOUNTER (OUTPATIENT)
Dept: LAB | Facility: HOSPITAL | Age: 87
Discharge: HOME | End: 2022-12-27
Attending: INTERNAL MEDICINE
Payer: MEDICARE

## 2022-12-27 DIAGNOSIS — I82.90 VENOUS THROMBOEMBOLISM: ICD-10-CM

## 2022-12-27 DIAGNOSIS — I48.19 PERSISTENT ATRIAL FIBRILLATION (HCC): ICD-10-CM

## 2022-12-27 LAB — INR BLDC: 2.2 (ref 0.9–1.1)

## 2022-12-27 PROCEDURE — 85610 PROTHROMBIN TIME: CPT | Performed by: INTERNAL MEDICINE

## 2022-12-28 ENCOUNTER — OFFICE VISIT (OUTPATIENT)
Dept: PODIATRY CLINIC | Facility: CLINIC | Age: 87
End: 2022-12-28
Payer: MEDICARE

## 2022-12-28 DIAGNOSIS — M20.41 HAMMER TOES OF BOTH FEET: ICD-10-CM

## 2022-12-28 DIAGNOSIS — L60.0 INGROWING TOENAIL: ICD-10-CM

## 2022-12-28 DIAGNOSIS — B35.1 ONYCHOMYCOSIS: ICD-10-CM

## 2022-12-28 DIAGNOSIS — M79.674 PAIN IN TOES OF BOTH FEET: ICD-10-CM

## 2022-12-28 DIAGNOSIS — L84 HELOMA DURUM: ICD-10-CM

## 2022-12-28 DIAGNOSIS — M79.675 PAIN IN TOES OF BOTH FEET: ICD-10-CM

## 2022-12-28 DIAGNOSIS — M20.42 HAMMER TOES OF BOTH FEET: ICD-10-CM

## 2022-12-28 DIAGNOSIS — N18.30 STAGE 3 CHRONIC KIDNEY DISEASE, UNSPECIFIED WHETHER STAGE 3A OR 3B CKD (HCC): Primary | ICD-10-CM

## 2022-12-28 PROCEDURE — 99213 OFFICE O/P EST LOW 20 MIN: CPT | Performed by: PODIATRIST

## 2023-01-05 ENCOUNTER — HOSPITAL ENCOUNTER (OUTPATIENT)
Dept: MAMMOGRAPHY | Facility: HOSPITAL | Age: 88
Discharge: HOME | End: 2023-01-05
Attending: FAMILY MEDICINE
Payer: MEDICARE

## 2023-01-05 ENCOUNTER — APPOINTMENT (OUTPATIENT)
Dept: MAMMOGRAPHY | Facility: HOSPITAL | Age: 88
End: 2023-01-05
Attending: FAMILY MEDICINE
Payer: MEDICARE

## 2023-01-05 DIAGNOSIS — R92.8 ABNORMAL FINDINGS ON DIAGNOSTIC IMAGING OF BREAST: ICD-10-CM

## 2023-01-05 DIAGNOSIS — N63.0 MASS OF BREAST, UNSPECIFIED LATERALITY: ICD-10-CM

## 2023-01-05 PROCEDURE — 77062 BREAST TOMOSYNTHESIS BI: CPT | Performed by: FAMILY MEDICINE

## 2023-01-05 PROCEDURE — 77066 DX MAMMO INCL CAD BI: CPT | Performed by: FAMILY MEDICINE

## 2023-01-05 PROCEDURE — 76642 ULTRASOUND BREAST LIMITED: CPT | Performed by: FAMILY MEDICINE

## 2023-04-04 ENCOUNTER — HOSPITAL ENCOUNTER (OUTPATIENT)
Dept: LAB | Facility: HOSPITAL | Age: 88
Discharge: HOME | End: 2023-04-04
Attending: INTERNAL MEDICINE
Payer: MEDICARE

## 2023-04-04 DIAGNOSIS — I82.90 VENOUS THROMBOEMBOLISM: ICD-10-CM

## 2023-04-04 DIAGNOSIS — I48.19 PERSISTENT ATRIAL FIBRILLATION (HCC): ICD-10-CM

## 2023-04-04 LAB — INR BLDC: 1.8 (ref 0.9–1.1)

## 2023-04-04 PROCEDURE — 85610 PROTHROMBIN TIME: CPT | Performed by: INTERNAL MEDICINE

## 2023-04-11 ENCOUNTER — HOSPITAL ENCOUNTER (OUTPATIENT)
Dept: LAB | Facility: HOSPITAL | Age: 88
Discharge: HOME | End: 2023-04-11
Attending: INTERNAL MEDICINE
Payer: MEDICARE

## 2023-04-11 DIAGNOSIS — I82.90 VENOUS THROMBOEMBOLISM: ICD-10-CM

## 2023-04-11 DIAGNOSIS — I48.19 PERSISTENT ATRIAL FIBRILLATION (HCC): ICD-10-CM

## 2023-04-11 LAB — INR BLDC: 2.6 (ref 0.9–1.1)

## 2023-04-11 PROCEDURE — 85610 PROTHROMBIN TIME: CPT | Performed by: INTERNAL MEDICINE

## 2023-04-18 ENCOUNTER — HOSPITAL ENCOUNTER (OUTPATIENT)
Dept: LAB | Facility: HOSPITAL | Age: 88
Discharge: HOME | End: 2023-04-18
Attending: INTERNAL MEDICINE
Payer: MEDICARE

## 2023-04-18 DIAGNOSIS — I82.90 VENOUS THROMBOEMBOLISM: ICD-10-CM

## 2023-04-18 DIAGNOSIS — I48.19 PERSISTENT ATRIAL FIBRILLATION (HCC): ICD-10-CM

## 2023-04-18 LAB — INR BLDC: 2.3 (ref 0.9–1.1)

## 2023-04-18 PROCEDURE — 85610 PROTHROMBIN TIME: CPT | Performed by: INTERNAL MEDICINE

## 2023-04-19 ENCOUNTER — OFFICE VISIT (OUTPATIENT)
Dept: PODIATRY CLINIC | Facility: CLINIC | Age: 88
End: 2023-04-19

## 2023-04-19 DIAGNOSIS — L84 HELOMA DURUM: Primary | ICD-10-CM

## 2023-04-19 DIAGNOSIS — M79.674 PAIN IN TOES OF BOTH FEET: ICD-10-CM

## 2023-04-19 DIAGNOSIS — M79.675 PAIN IN TOES OF BOTH FEET: ICD-10-CM

## 2023-04-19 DIAGNOSIS — L60.0 INGROWING TOENAIL: ICD-10-CM

## 2023-04-19 DIAGNOSIS — M20.41 HAMMER TOES OF BOTH FEET: ICD-10-CM

## 2023-04-19 DIAGNOSIS — M20.42 HAMMER TOES OF BOTH FEET: ICD-10-CM

## 2023-04-19 PROCEDURE — 99213 OFFICE O/P EST LOW 20 MIN: CPT | Performed by: PODIATRIST

## 2023-04-21 ENCOUNTER — HOSPITAL ENCOUNTER (OUTPATIENT)
Dept: ULTRASOUND IMAGING | Age: 88
Discharge: HOME | End: 2023-04-21
Attending: FAMILY MEDICINE
Payer: MEDICARE

## 2023-04-21 DIAGNOSIS — N93.9 VAGINAL BLEEDING: ICD-10-CM

## 2023-04-21 DIAGNOSIS — N93.9 ABNORMAL VAGINAL BLEEDING: ICD-10-CM

## 2023-04-21 DIAGNOSIS — R31.9 HEMATURIA: ICD-10-CM

## 2023-04-21 PROCEDURE — 76770 US EXAM ABDO BACK WALL COMP: CPT | Performed by: FAMILY MEDICINE

## 2023-04-21 PROCEDURE — 76856 US EXAM PELVIC COMPLETE: CPT | Performed by: FAMILY MEDICINE

## 2023-04-21 PROCEDURE — 76830 TRANSVAGINAL US NON-OB: CPT | Performed by: FAMILY MEDICINE

## 2023-05-08 ENCOUNTER — LAB ENCOUNTER (OUTPATIENT)
Dept: LAB | Age: 88
End: 2023-05-08
Attending: FAMILY MEDICINE
Payer: MEDICARE

## 2023-05-08 DIAGNOSIS — Z79.899 NEED FOR PROPHYLACTIC CHEMOTHERAPY: ICD-10-CM

## 2023-05-08 DIAGNOSIS — R53.83 FATIGUE: ICD-10-CM

## 2023-05-08 DIAGNOSIS — N93.9 VAGINAL BLEEDING, ABNORMAL: ICD-10-CM

## 2023-05-08 DIAGNOSIS — I12.9 RENAL HYPERTENSION: ICD-10-CM

## 2023-05-08 DIAGNOSIS — I10 ESSENTIAL HYPERTENSION, MALIGNANT: ICD-10-CM

## 2023-05-08 DIAGNOSIS — R31.9 HEMATURIA, UNSPECIFIED: ICD-10-CM

## 2023-05-08 DIAGNOSIS — D64.9 ANEMIA, UNSPECIFIED: Primary | ICD-10-CM

## 2023-05-08 DIAGNOSIS — E66.3 OVER WEIGHT: ICD-10-CM

## 2023-05-08 LAB
ALBUMIN SERPL-MCNC: 4.2 G/DL (ref 3.4–5)
ALBUMIN/GLOB SERPL: 1.5 {RATIO} (ref 1–2)
ALP LIVER SERPL-CCNC: 91 U/L
ALT SERPL-CCNC: 27 U/L
ANION GAP SERPL CALC-SCNC: 2 MMOL/L (ref 0–18)
AST SERPL-CCNC: 14 U/L (ref 15–37)
BASOPHILS # BLD AUTO: 0.01 X10(3) UL (ref 0–0.2)
BASOPHILS NFR BLD AUTO: 0.3 %
BILIRUB SERPL-MCNC: 0.9 MG/DL (ref 0.1–2)
BUN BLD-MCNC: 24 MG/DL (ref 7–18)
CALCIUM BLD-MCNC: 9.5 MG/DL (ref 8.5–10.1)
CHLORIDE SERPL-SCNC: 109 MMOL/L (ref 98–112)
CHOLEST SERPL-MCNC: 167 MG/DL (ref ?–200)
CO2 SERPL-SCNC: 30 MMOL/L (ref 21–32)
CREAT BLD-MCNC: 1.34 MG/DL
DEPRECATED HBV CORE AB SER IA-ACNC: 339.5 NG/ML
EOSINOPHIL # BLD AUTO: 0.14 X10(3) UL (ref 0–0.7)
EOSINOPHIL NFR BLD AUTO: 3.9 %
ERYTHROCYTE [DISTWIDTH] IN BLOOD BY AUTOMATED COUNT: 14.2 %
EST. AVERAGE GLUCOSE BLD GHB EST-MCNC: 103 MG/DL (ref 68–126)
FASTING PATIENT LIPID ANSWER: YES
FASTING STATUS PATIENT QL REPORTED: YES
GFR SERPLBLD BASED ON 1.73 SQ M-ARVRAT: 38 ML/MIN/1.73M2 (ref 60–?)
GLOBULIN PLAS-MCNC: 2.8 G/DL (ref 2.8–4.4)
GLUCOSE BLD-MCNC: 92 MG/DL (ref 70–99)
HBA1C MFR BLD: 5.2 % (ref ?–5.7)
HCT VFR BLD AUTO: 37.5 %
HDLC SERPL-MCNC: 56 MG/DL (ref 40–59)
HGB BLD-MCNC: 11.8 G/DL
IMM GRANULOCYTES # BLD AUTO: 0.01 X10(3) UL (ref 0–1)
IMM GRANULOCYTES NFR BLD: 0.3 %
IRON SATN MFR SERPL: 15 %
IRON SERPL-MCNC: 52 UG/DL
LDLC SERPL CALC-MCNC: 90 MG/DL (ref ?–100)
LYMPHOCYTES # BLD AUTO: 0.84 X10(3) UL (ref 1–4)
LYMPHOCYTES NFR BLD AUTO: 23.5 %
MCH RBC QN AUTO: 31.2 PG (ref 26–34)
MCHC RBC AUTO-ENTMCNC: 31.5 G/DL (ref 31–37)
MCV RBC AUTO: 99.2 FL
MONOCYTES # BLD AUTO: 0.31 X10(3) UL (ref 0.1–1)
MONOCYTES NFR BLD AUTO: 8.7 %
NEUTROPHILS # BLD AUTO: 2.27 X10 (3) UL (ref 1.5–7.7)
NEUTROPHILS # BLD AUTO: 2.27 X10(3) UL (ref 1.5–7.7)
NEUTROPHILS NFR BLD AUTO: 63.3 %
NONHDLC SERPL-MCNC: 111 MG/DL (ref ?–130)
OSMOLALITY SERPL CALC.SUM OF ELEC: 296 MOSM/KG (ref 275–295)
PLATELET # BLD AUTO: 125 10(3)UL (ref 150–450)
POTASSIUM SERPL-SCNC: 4.2 MMOL/L (ref 3.5–5.1)
PROT SERPL-MCNC: 7 G/DL (ref 6.4–8.2)
RBC # BLD AUTO: 3.78 X10(6)UL
SODIUM SERPL-SCNC: 141 MMOL/L (ref 136–145)
TIBC SERPL-MCNC: 356 UG/DL (ref 240–450)
TRANSFERRIN SERPL-MCNC: 239 MG/DL (ref 200–360)
TRIGL SERPL-MCNC: 120 MG/DL (ref 30–149)
TSI SER-ACNC: 1.68 MIU/ML (ref 0.36–3.74)
VIT B12 SERPL-MCNC: 1721 PG/ML (ref 193–986)
VIT D+METAB SERPL-MCNC: 35.9 NG/ML (ref 30–100)
VLDLC SERPL CALC-MCNC: 19 MG/DL (ref 0–30)
WBC # BLD AUTO: 3.6 X10(3) UL (ref 4–11)

## 2023-05-08 PROCEDURE — 84443 ASSAY THYROID STIM HORMONE: CPT

## 2023-05-08 PROCEDURE — 83540 ASSAY OF IRON: CPT

## 2023-05-08 PROCEDURE — 83036 HEMOGLOBIN GLYCOSYLATED A1C: CPT

## 2023-05-08 PROCEDURE — 80053 COMPREHEN METABOLIC PANEL: CPT

## 2023-05-08 PROCEDURE — 82728 ASSAY OF FERRITIN: CPT

## 2023-05-08 PROCEDURE — 83550 IRON BINDING TEST: CPT

## 2023-05-08 PROCEDURE — 36415 COLL VENOUS BLD VENIPUNCTURE: CPT

## 2023-05-08 PROCEDURE — 82306 VITAMIN D 25 HYDROXY: CPT

## 2023-05-08 PROCEDURE — 85025 COMPLETE CBC W/AUTO DIFF WBC: CPT

## 2023-05-08 PROCEDURE — 80061 LIPID PANEL: CPT

## 2023-05-08 PROCEDURE — 82607 VITAMIN B-12: CPT

## 2023-05-16 ENCOUNTER — HOSPITAL ENCOUNTER (OUTPATIENT)
Dept: LAB | Facility: HOSPITAL | Age: 88
Discharge: HOME | End: 2023-05-16
Attending: INTERNAL MEDICINE
Payer: MEDICARE

## 2023-05-16 DIAGNOSIS — I48.19 PERSISTENT ATRIAL FIBRILLATION (HCC): ICD-10-CM

## 2023-05-16 DIAGNOSIS — I82.90 VENOUS THROMBOEMBOLISM: ICD-10-CM

## 2023-05-16 LAB — INR BLDC: 2.2 (ref 0.9–1.1)

## 2023-05-16 PROCEDURE — 85610 PROTHROMBIN TIME: CPT | Performed by: INTERNAL MEDICINE

## 2023-06-08 RX ORDER — ROSUVASTATIN CALCIUM 10 MG/1
10 TABLET, COATED ORAL DAILY
COMMUNITY
Start: 2022-12-29

## 2023-06-08 RX ORDER — OMEPRAZOLE 40 MG/1
40 CAPSULE, DELAYED RELEASE ORAL
COMMUNITY
Start: 2023-05-08

## 2023-06-13 ENCOUNTER — HOSPITAL ENCOUNTER (OUTPATIENT)
Dept: LAB | Facility: HOSPITAL | Age: 88
Discharge: HOME | End: 2023-06-13
Attending: STUDENT IN AN ORGANIZED HEALTH CARE EDUCATION/TRAINING PROGRAM
Payer: MEDICARE

## 2023-06-13 ENCOUNTER — NURSE ONLY (OUTPATIENT)
Dept: LAB | Facility: HOSPITAL | Age: 88
End: 2023-06-13
Attending: STUDENT IN AN ORGANIZED HEALTH CARE EDUCATION/TRAINING PROGRAM
Payer: MEDICARE

## 2023-06-13 DIAGNOSIS — Z01.818 PRE-OP TESTING: ICD-10-CM

## 2023-06-13 DIAGNOSIS — I82.90 VENOUS THROMBOEMBOLISM: ICD-10-CM

## 2023-06-13 DIAGNOSIS — I48.19 PERSISTENT ATRIAL FIBRILLATION (HCC): ICD-10-CM

## 2023-06-13 LAB
CHLORIDE SERPL-SCNC: 108 MMOL/L (ref 98–112)
CO2 SERPL-SCNC: 30 MMOL/L (ref 21–32)
ERYTHROCYTE [DISTWIDTH] IN BLOOD BY AUTOMATED COUNT: 13.4 %
HCT VFR BLD AUTO: 38.1 %
HGB BLD-MCNC: 11.9 G/DL
INR BLDC: 1.3 (ref 0.9–1.1)
MCH RBC QN AUTO: 30.4 PG (ref 26–34)
MCHC RBC AUTO-ENTMCNC: 31.2 G/DL (ref 31–37)
MCV RBC AUTO: 97.4 FL
PLATELET # BLD AUTO: 141 10(3)UL (ref 150–450)
POTASSIUM SERPL-SCNC: 4.2 MMOL/L (ref 3.5–5.1)
RBC # BLD AUTO: 3.91 X10(6)UL
SODIUM SERPL-SCNC: 142 MMOL/L (ref 136–145)
WBC # BLD AUTO: 4.4 X10(3) UL (ref 4–11)

## 2023-06-13 PROCEDURE — 93005 ELECTROCARDIOGRAM TRACING: CPT

## 2023-06-13 PROCEDURE — 93010 ELECTROCARDIOGRAM REPORT: CPT | Performed by: INTERNAL MEDICINE

## 2023-06-13 PROCEDURE — 80051 ELECTROLYTE PANEL: CPT

## 2023-06-13 PROCEDURE — 85027 COMPLETE CBC AUTOMATED: CPT

## 2023-06-13 PROCEDURE — 85610 PROTHROMBIN TIME: CPT | Performed by: INTERNAL MEDICINE

## 2023-06-13 PROCEDURE — 36415 COLL VENOUS BLD VENIPUNCTURE: CPT

## 2023-06-14 ENCOUNTER — ANESTHESIA EVENT (OUTPATIENT)
Dept: SURGERY | Facility: HOSPITAL | Age: 88
End: 2023-06-14
Payer: MEDICARE

## 2023-06-14 PROBLEM — N95.0 POSTMENOPAUSAL BLEEDING: Status: ACTIVE | Noted: 2023-06-14

## 2023-06-14 LAB
ATRIAL RATE: 56 BPM
P AXIS: 98 DEGREES
P-R INTERVAL: 200 MS
Q-T INTERVAL: 462 MS
QRS DURATION: 100 MS
QTC CALCULATION (BEZET): 445 MS
R AXIS: -10 DEGREES
T AXIS: 29 DEGREES
VENTRICULAR RATE: 56 BPM

## 2023-06-15 ENCOUNTER — HOSPITAL ENCOUNTER (OUTPATIENT)
Facility: HOSPITAL | Age: 88
Setting detail: HOSPITAL OUTPATIENT SURGERY
Discharge: HOME | End: 2023-06-15
Attending: STUDENT IN AN ORGANIZED HEALTH CARE EDUCATION/TRAINING PROGRAM | Admitting: STUDENT IN AN ORGANIZED HEALTH CARE EDUCATION/TRAINING PROGRAM
Payer: MEDICARE

## 2023-06-15 ENCOUNTER — ANESTHESIA (OUTPATIENT)
Dept: SURGERY | Facility: HOSPITAL | Age: 88
End: 2023-06-15
Payer: MEDICARE

## 2023-06-15 VITALS
HEART RATE: 49 BPM | WEIGHT: 209 LBS | OXYGEN SATURATION: 100 % | SYSTOLIC BLOOD PRESSURE: 167 MMHG | BODY MASS INDEX: 29.26 KG/M2 | HEIGHT: 71 IN | DIASTOLIC BLOOD PRESSURE: 62 MMHG | RESPIRATION RATE: 18 BRPM | TEMPERATURE: 97 F

## 2023-06-15 DIAGNOSIS — Z01.818 PRE-OP TESTING: Primary | ICD-10-CM

## 2023-06-15 PROBLEM — N95.0 POSTMENOPAUSAL BLEEDING: Status: RESOLVED | Noted: 2023-06-14 | Resolved: 2023-06-15

## 2023-06-15 PROBLEM — R15.9 ANAL SPHINCTER INCONTINENCE: Status: RESOLVED | Noted: 2017-10-08 | Resolved: 2023-06-15

## 2023-06-15 LAB
APTT PPP: 29.7 SECONDS (ref 23.3–35.6)
INR BLD: 1.2 (ref 0.85–1.16)
PROTHROMBIN TIME: 15.2 SECONDS (ref 11.6–14.8)

## 2023-06-15 PROCEDURE — 85730 THROMBOPLASTIN TIME PARTIAL: CPT

## 2023-06-15 PROCEDURE — 0UBC8ZZ EXCISION OF CERVIX, VIA NATURAL OR ARTIFICIAL OPENING ENDOSCOPIC: ICD-10-PCS | Performed by: STUDENT IN AN ORGANIZED HEALTH CARE EDUCATION/TRAINING PROGRAM

## 2023-06-15 PROCEDURE — 85610 PROTHROMBIN TIME: CPT

## 2023-06-15 PROCEDURE — 88305 TISSUE EXAM BY PATHOLOGIST: CPT | Performed by: STUDENT IN AN ORGANIZED HEALTH CARE EDUCATION/TRAINING PROGRAM

## 2023-06-15 RX ORDER — LIDOCAINE HYDROCHLORIDE 10 MG/ML
INJECTION, SOLUTION EPIDURAL; INFILTRATION; INTRACAUDAL; PERINEURAL AS NEEDED
Status: DISCONTINUED | OUTPATIENT
Start: 2023-06-15 | End: 2023-06-15 | Stop reason: SURG

## 2023-06-15 RX ORDER — KETOROLAC TROMETHAMINE 30 MG/ML
INJECTION, SOLUTION INTRAMUSCULAR; INTRAVENOUS AS NEEDED
Status: DISCONTINUED | OUTPATIENT
Start: 2023-06-15 | End: 2023-06-15 | Stop reason: SURG

## 2023-06-15 RX ORDER — HYDROCODONE BITARTRATE AND ACETAMINOPHEN 5; 325 MG/1; MG/1
1 TABLET ORAL ONCE AS NEEDED
Status: DISCONTINUED | OUTPATIENT
Start: 2023-06-15 | End: 2023-06-15

## 2023-06-15 RX ORDER — HYDROMORPHONE HYDROCHLORIDE 1 MG/ML
0.2 INJECTION, SOLUTION INTRAMUSCULAR; INTRAVENOUS; SUBCUTANEOUS EVERY 5 MIN PRN
Status: DISCONTINUED | OUTPATIENT
Start: 2023-06-15 | End: 2023-06-15

## 2023-06-15 RX ORDER — ACETAMINOPHEN 500 MG
1000 TABLET ORAL ONCE AS NEEDED
Status: DISCONTINUED | OUTPATIENT
Start: 2023-06-15 | End: 2023-06-15

## 2023-06-15 RX ORDER — HYDROMORPHONE HYDROCHLORIDE 1 MG/ML
0.6 INJECTION, SOLUTION INTRAMUSCULAR; INTRAVENOUS; SUBCUTANEOUS EVERY 5 MIN PRN
Status: DISCONTINUED | OUTPATIENT
Start: 2023-06-15 | End: 2023-06-15

## 2023-06-15 RX ORDER — HYDROMORPHONE HYDROCHLORIDE 1 MG/ML
0.4 INJECTION, SOLUTION INTRAMUSCULAR; INTRAVENOUS; SUBCUTANEOUS EVERY 5 MIN PRN
Status: DISCONTINUED | OUTPATIENT
Start: 2023-06-15 | End: 2023-06-15

## 2023-06-15 RX ORDER — NALOXONE HYDROCHLORIDE 0.4 MG/ML
80 INJECTION, SOLUTION INTRAMUSCULAR; INTRAVENOUS; SUBCUTANEOUS AS NEEDED
Status: DISCONTINUED | OUTPATIENT
Start: 2023-06-15 | End: 2023-06-15

## 2023-06-15 RX ORDER — ONDANSETRON 2 MG/ML
INJECTION INTRAMUSCULAR; INTRAVENOUS AS NEEDED
Status: DISCONTINUED | OUTPATIENT
Start: 2023-06-15 | End: 2023-06-15 | Stop reason: SURG

## 2023-06-15 RX ORDER — METOCLOPRAMIDE HYDROCHLORIDE 5 MG/ML
10 INJECTION INTRAMUSCULAR; INTRAVENOUS EVERY 8 HOURS PRN
Status: DISCONTINUED | OUTPATIENT
Start: 2023-06-15 | End: 2023-06-15

## 2023-06-15 RX ORDER — HYDROCODONE BITARTRATE AND ACETAMINOPHEN 5; 325 MG/1; MG/1
2 TABLET ORAL ONCE AS NEEDED
Status: DISCONTINUED | OUTPATIENT
Start: 2023-06-15 | End: 2023-06-15

## 2023-06-15 RX ORDER — SODIUM CHLORIDE, SODIUM LACTATE, POTASSIUM CHLORIDE, CALCIUM CHLORIDE 600; 310; 30; 20 MG/100ML; MG/100ML; MG/100ML; MG/100ML
INJECTION, SOLUTION INTRAVENOUS CONTINUOUS
Status: DISCONTINUED | OUTPATIENT
Start: 2023-06-15 | End: 2023-06-15

## 2023-06-15 RX ORDER — ONDANSETRON 2 MG/ML
4 INJECTION INTRAMUSCULAR; INTRAVENOUS EVERY 6 HOURS PRN
Status: DISCONTINUED | OUTPATIENT
Start: 2023-06-15 | End: 2023-06-15

## 2023-06-15 RX ORDER — ACETAMINOPHEN 500 MG
1000 TABLET ORAL ONCE
Status: DISCONTINUED | OUTPATIENT
Start: 2023-06-15 | End: 2023-06-15 | Stop reason: HOSPADM

## 2023-06-15 RX ADMIN — KETOROLAC TROMETHAMINE 15 MG: 30 INJECTION, SOLUTION INTRAMUSCULAR; INTRAVENOUS at 09:31:00

## 2023-06-15 RX ADMIN — LIDOCAINE HYDROCHLORIDE 50 MG: 10 INJECTION, SOLUTION EPIDURAL; INFILTRATION; INTRACAUDAL; PERINEURAL at 08:44:00

## 2023-06-15 RX ADMIN — ONDANSETRON 4 MG: 2 INJECTION INTRAMUSCULAR; INTRAVENOUS at 09:31:00

## 2023-06-15 RX ADMIN — SODIUM CHLORIDE, SODIUM LACTATE, POTASSIUM CHLORIDE, CALCIUM CHLORIDE: 600; 310; 30; 20 INJECTION, SOLUTION INTRAVENOUS at 08:40:00

## 2023-06-15 NOTE — ANESTHESIA POSTPROCEDURE EVALUATION
[address] Patient Status:  Hospital Outpatient Surgery   Age/Gender 80year old female MRN [id number]   [facility] SURGERY Attending Brittany Zapata MD   Hosp Day # 0 PCP Michael Mckay MD       Anesthesia Post-op Note    OPERATIVE HYSTEROSCOPY, POLYPECTOMY WITH TRUCLEAR AND DILATION AND CURETTAGE, MYOMECTOMY    Procedure Summary     Date: 06/15/23 Room / Location: 1404 [facility] OR 03 / 1404 [facility] OR    Anesthesia Start: 0755 Anesthesia Stop: 2304    Procedure: OPERATIVE HYSTEROSCOPY, POLYPECTOMY WITH TRUCLEAR AND DILATION AND CURETTAGE, MYOMECTOMY Diagnosis: (POSTMENOPAUSAL BLEEDING; THICKENED ENDOMETRIUM, SUBMUCOSAL FIBROID)    Surgeons: Brittany Zapata MD Anesthesiologist: Pauline Alvarado MD    Anesthesia Type: MAC ASA Status: 3          Anesthesia Type: MAC    Vitals Value Taken Time   /61 06/15/23 0943   Temp 97 06/15/23 0943   Pulse 61 06/15/23 0943   Resp 20 06/15/23 0943   SpO2 97 06/15/23 0943       Patient Location: Same Day Surgery    Anesthesia Type: MAC    Airway Patency: patent    Postop Pain Control: adequate    Mental Status: mildly sedated but able to meaningfully participate in the post-anesthesia evaluation    Nausea/Vomiting: none    Cardiopulmonary/Hydration status: stable euvolemic    Complications: no apparent anesthesia related complications    Postop vital signs: stable    Dental Exam: Unchanged from Preop    Patient to be discharged home when criteria met.

## 2023-06-15 NOTE — OPERATIVE REPORT
[facility]  Operative Report      [address] Patient Status:  Hospital Outpatient Surgery    1934 MRN [id number]   [facility] SURGERY Attending Verona Slaughter MD   Hosp Day # 0 PCP Venancio Goldsmith MD       Procedure: operative hysteroscopy, dilation and curettage, polypectomy, myomectomy    Pre op diagnosis: postmenopausal bleeding, thickened endometrium, endometrial polyps, uterine leiomyoma    Post op diagnosis: postmenopausal bleeding, thickened endometrium, endometrial polyps, uterine leiomyoma, endocervical polyps    Surgeon: Suresh Juarez MD    Assistant: none    Anesthesia: MAC    Complications: none    EBL: 1 mL    Fluids: 300 mL  Uterine Distension Media: Normal Saline  Deficit: 40 mL    Urine output: 150 mL    Specimens: curettings    Findings:   1. Normal external genitalia. No lesions   2. Normal appearing cervix. Multiple endocervical polyps. 3. Atrophic endometrium. Multiple endometrial polyps. Left/posterior leiomyoma with <50% submucosal.      Procedure Details: The patient was taken to the operating room where anesthesia was initiated. The patient was prepped and draped in the normal, sterile fashion in dorsal lithotomy position. A straight catheter was used to empty the bladder. A sterile speculum was placed in the vagina. A single tooth tenaculum was placed on the anterior lip of the cervix. The cervix was then gently dilated to 6 mm using Hegar dilators. After the system was assembled and calibrated, the [facility] hysteroscope was inserted into the external os and gently advanced. The TRUCLEAR soft tissue mini blade was inserted through the operative port. Window lock was set to minimize fluid use and maintain uterine distention. The above findings were noted with multiple photos taken. The system was activated by stepping on the pedal, and the tissue was simultaneously cut and aspirated. The right ostium was visualized.  The left ostium was not visualized due to submucosal fibroid. The hysteroscope and blade were removed. Diffuse curettage of the cavity was performed using the hysteroscope. The leiomyoma was partially resected to allow for curettage of the cavity behind it. Because more than half of the leiomyoma was not within the uterine cavity, an attempt was not made to fully resect the submucosal portion. All specimen were sent to pathology. The single tooth tenaculum was removed from the anterior lip of the cervix. Good hemostasis was noted. All sponge, lap and instrument counts were correct x 2. The patient tolerated the procedure well and was taken to recovery room in stable condition.     Severino Spence MD  6/15/2023

## 2023-06-15 NOTE — DISCHARGE INSTRUCTIONS
Discharge Instructions: Nothing in the vagina for 2 weeks. Follow-up with Dr. Jen Vincent in office in 2 weeks. Call your physician if any fever (temp>100.4), extreme vaginal bleeding (soaking through a pad in an hour), or severe pelvic pain.

## 2023-06-19 ENCOUNTER — HOSPITAL ENCOUNTER (OUTPATIENT)
Dept: LAB | Facility: HOSPITAL | Age: 88
Discharge: HOME | End: 2023-06-19
Attending: INTERNAL MEDICINE
Payer: MEDICARE

## 2023-06-19 DIAGNOSIS — I48.19 PERSISTENT ATRIAL FIBRILLATION (HCC): ICD-10-CM

## 2023-06-19 DIAGNOSIS — I82.90 VENOUS THROMBOEMBOLISM: ICD-10-CM

## 2023-06-19 LAB — INR BLDC: 1.2 (ref 0.9–1.1)

## 2023-06-19 PROCEDURE — 85610 PROTHROMBIN TIME: CPT | Performed by: INTERNAL MEDICINE

## 2023-06-23 ENCOUNTER — HOSPITAL ENCOUNTER (OUTPATIENT)
Dept: LAB | Facility: HOSPITAL | Age: 88
Discharge: HOME | End: 2023-06-23
Attending: INTERNAL MEDICINE
Payer: MEDICARE

## 2023-06-23 DIAGNOSIS — I48.91 ATRIAL FIBRILLATION (HCC): Primary | ICD-10-CM

## 2023-06-23 DIAGNOSIS — I82.90 VENOUS THROMBOEMBOLISM: ICD-10-CM

## 2023-06-23 DIAGNOSIS — I48.19 PERSISTENT ATRIAL FIBRILLATION (HCC): ICD-10-CM

## 2023-06-23 LAB — INR BLDC: 1.9 (ref 0.9–1.1)

## 2023-06-23 PROCEDURE — 85610 PROTHROMBIN TIME: CPT | Performed by: INTERNAL MEDICINE

## 2023-06-27 ENCOUNTER — HOSPITAL ENCOUNTER (OUTPATIENT)
Dept: LAB | Facility: HOSPITAL | Age: 88
Discharge: HOME | End: 2023-06-27
Attending: INTERNAL MEDICINE
Payer: MEDICARE

## 2023-06-27 DIAGNOSIS — I48.91 ATRIAL FIBRILLATION (HCC): ICD-10-CM

## 2023-06-27 LAB — INR BLDC: 2.7 (ref 0.9–1.1)

## 2023-06-27 PROCEDURE — 85610 PROTHROMBIN TIME: CPT

## 2023-07-05 ENCOUNTER — HOSPITAL ENCOUNTER (OUTPATIENT)
Dept: LAB | Facility: HOSPITAL | Age: 88
Discharge: HOME | End: 2023-07-05
Attending: INTERNAL MEDICINE
Payer: MEDICARE

## 2023-07-05 DIAGNOSIS — I48.91 ATRIAL FIBRILLATION (HCC): ICD-10-CM

## 2023-07-05 LAB — INR BLDC: 2.5 (ref 0.9–1.1)

## 2023-07-05 PROCEDURE — 85610 PROTHROMBIN TIME: CPT

## 2023-07-24 ENCOUNTER — HOSPITAL ENCOUNTER (OUTPATIENT)
Dept: LAB | Facility: HOSPITAL | Age: 88
Discharge: HOME | End: 2023-07-24
Attending: INTERNAL MEDICINE
Payer: MEDICARE

## 2023-07-24 DIAGNOSIS — I48.91 ATRIAL FIBRILLATION (HCC): ICD-10-CM

## 2023-07-24 LAB — INR BLDC: 1.9 (ref 0.9–1.1)

## 2023-07-24 PROCEDURE — 85610 PROTHROMBIN TIME: CPT

## 2023-07-26 ENCOUNTER — OFFICE VISIT (OUTPATIENT)
Dept: PODIATRY CLINIC | Facility: CLINIC | Age: 88
End: 2023-07-26

## 2023-07-26 DIAGNOSIS — M79.674 PAIN IN TOES OF BOTH FEET: ICD-10-CM

## 2023-07-26 DIAGNOSIS — L60.0 INGROWING TOENAIL: ICD-10-CM

## 2023-07-26 DIAGNOSIS — M20.42 HAMMER TOES OF BOTH FEET: ICD-10-CM

## 2023-07-26 DIAGNOSIS — N18.30 STAGE 3 CHRONIC KIDNEY DISEASE, UNSPECIFIED WHETHER STAGE 3A OR 3B CKD (HCC): Primary | ICD-10-CM

## 2023-07-26 DIAGNOSIS — B35.1 ONYCHOMYCOSIS: ICD-10-CM

## 2023-07-26 DIAGNOSIS — M20.41 HAMMER TOES OF BOTH FEET: ICD-10-CM

## 2023-07-26 DIAGNOSIS — L84 HELOMA DURUM: ICD-10-CM

## 2023-07-26 DIAGNOSIS — M79.675 PAIN IN TOES OF BOTH FEET: ICD-10-CM

## 2023-07-26 DIAGNOSIS — L84 HELOMA MOLLE: ICD-10-CM

## 2023-07-26 PROCEDURE — 99213 OFFICE O/P EST LOW 20 MIN: CPT | Performed by: PODIATRIST

## 2023-07-27 NOTE — PROGRESS NOTES
Wendy Valencia is a 80year old female. Patient presents with:  Toenail Care: Nail care and foot check. HPI:   Patient returns to the clinic for routine foot care. She can no longer provide self-care. And painful corns. She is here basically for routine care. She is now complaining of a painful spot on the fourth toe she points to the area over the DIP joint of her fourth toe, right foot. And trimming of her toenails but she does complain of painful corns 1 at the tip of the third toe on the left and 1 at the tip of the fourth toe on the right. At today's visit reviewed nurse's history as taken above, allergies medications and medical history as documented below. All changes duly noted  Allergies: Celecoxib   Current Outpatient Medications   Medication Sig Dispense Refill    rosuvastatin 10 MG Oral Tab Take 1 tablet (10 mg total) by mouth daily. Omeprazole 40 MG Oral Capsule Delayed Release Take 1 capsule (40 mg total) by mouth before breakfast.      HYDROcodone-acetaminophen (NORCO) 5-325 MG Oral Tab Take 1-2 tablets by mouth every 6 (six) hours as needed for Pain. 30 tablet 0    Flecainide Acetate 100 MG Oral Tab Take 1 tablet (100 mg total) by mouth every 12 (twelve) hours. 60 tablet 5    Warfarin Sodium 5 MG Oral Tab Take 1 tablet (5 mg total) by mouth daily. 6 mg five day per week  2.5 mg 1 x a week      Losartan Potassium 25 MG Oral Tab Take 1 tablet (25 mg total) by mouth daily. Acetaminophen (TYLENOL ARTHRITIS EXT RELIEF OR) Take 2 tablets by mouth 2 (two) times daily. Vitamin B-12 500 MCG Oral Tab Take 0.5 tablets (250 mcg total) by mouth twice a week. ferrous sulfate 325 (65 FE) MG Oral Tab EC Take 1 tablet (325 mg total) by mouth daily with breakfast. 3 times a week      Potassium Chloride Barbra CR 20 MEQ Oral Tab CR Take 1 tablet (20 mEq total) by mouth daily. Calcium Carbonate-Vitamin D (CALCIUM 600 + D OR) Take 1 tablet by mouth daily.       Torsemide (DEMADEX) 20 MG Oral Tab Take 1 tablet (20 mg total) by mouth every other day. I tab on odd and 2 on even days      Metoprolol Succinate (TOPROL XL) 25 MG Oral Tablet SR 24 Hr Take 1 tablet (25 mg total) by mouth daily. Half tablet until 6/13 then plans to discontinue (Patient not taking: Reported on 7/26/2023)        Past Medical History:   Diagnosis Date    Anal spasm     Arrhythmia 12/2017    atrial fibrillation    BLOOD CLOTS     Carotid atherosclerosis     Chronic anemia     Dermatophytosis of nail     Dizziness     End stage renal disease (HCC)     Esophageal reflux     Hearing impairment     HIGH BLOOD PRESSURE     HIGH CHOLESTEROL     Muscle weakness     Osteoarthrosis, unspecified whether generalized or localized, unspecified site     Other screening mammogram 06/03/2010    Postmenopausal bleeding 04/2023    Special screening for osteoporosis 06/04/2003    Uterine fibroid     Venous insufficiency     Visual impairment     glasses      Past Surgical History:   Procedure Laterality Date    CARDIOVERSION  11/2017    COLONOSCOPY      HIP REPLACEMENT SURGERY      LIG DIV&STRIPPING SHORT SAPHENOUS VEIN  1979    LIGATION OF HEMORRHOID(S)      MASTOID OBLITERATION      OTHER SURGICAL HISTORY      Mastoidectomy    OTHER SURGICAL HISTORY      Venous Ligation with Stripping    OTHER SURGICAL HISTORY      Venous Sclerosing By Injection    TONSILLECTOMY      TOTAL HIP REPLACEMENT Bilateral     12/2013 and 2018    [facility]        Family History   Problem Relation Age of Onset    Cancer Mother         ovarian    Other (congestive heart failure) Father     Cancer Maternal Grandmother         colon    Cancer Maternal Grandfather         colon    Breast Cancer Maternal Aunt 72    Breast Cancer Paternal Aunt 72    Cancer Maternal Uncle         prostate    Cancer Maternal Uncle         eye    Cancer Maternal Uncle         lung      Social History    Socioeconomic History      Marital status:      Tobacco Use Smoking status: Never      Smokeless tobacco: Never    Vaping Use      Vaping Use: Never used    Substance and Sexual Activity      Alcohol use: No      Drug use: No    Other Topics      Concerns:        Caffeine Concern: Yes          OCC        Exercise: Yes          walks occ, PT exercises at  home          REVIEW OF SYSTEMS:   Today reviewed systens as documented below  GENERAL HEALTH: feels well otherwise  SKIN: Refer to exam below  RESPIRATORY: denies shortness of breath with exertion  CARDIOVASCULAR: denies chest pain on exertion  GI: denies abdominal pain and denies heartburn  NEURO: denies headaches    EXAM:   There were no vitals taken for this visit. GENERAL: well developed, well nourished, in no apparent distress  EXTREMITIES:   1. Integument: The skin on her feet was evaluated its warm and dry there are multiple telangiectasias on both feet due to venous stasis and varicosities. They are painful nucleated keratomas at the tip of the third toe left and tip of the fourth toe right secondary to adjacent hammertoe deformities. There are no sores ulcerations or other problems noted at this visit. The fourth toe she has developed a new corn over the DIP joint area secondary to a rigid hammertoe on the right foot. 2. Vascular: Patient has weakly palpable pulses dorsalis pedis and posterior tibial bilateral   3. Neurologic: The patient has intact sensorium   4. Musculoskeletal: The patient has hammertoes digits 2-5 bilateral feet was distal tip keratomas 3 left and 4 right. They do not reduce on forefoot loading. Today I could not elicit any neuroma-like symptoms in the third interspace of her right foot therefore I did not recommend a cortisone injection.     ASSESSMENT AND PLAN:   Diagnoses and all orders for this visit:    Stage 3 chronic kidney disease, unspecified whether stage 3a or 3b CKD (HCC)    Heloma durum    Hammer toes of both feet    Pain in toes of both feet    Ingrowing toenail    Onychomycosis    Heloma molle        Plan: Today's visit trimmed toenails 1-5 manually can be started on healthy tissue as possible using a 15 blade I trimmed and debrided the hyperkeratoses at the distal tip of the toes that were affected the third on the left and the fourth on the right alleviating the patient's symptoms. Again discussed with her proper fitting shoe gear and advised her to follow-up every 2 to 3 months for care but sooner if required. After trimming down all hyperkeratoses gave her silicone pads to wear on the fourth toe we will see how they work for her. The patient indicates understanding of these issues and agrees to the plan.     Khadra Lizama DPM

## 2023-08-07 ENCOUNTER — HOSPITAL ENCOUNTER (OUTPATIENT)
Dept: LAB | Facility: HOSPITAL | Age: 88
Discharge: HOME | End: 2023-08-07
Attending: INTERNAL MEDICINE
Payer: MEDICARE

## 2023-08-07 DIAGNOSIS — I48.91 ATRIAL FIBRILLATION (HCC): ICD-10-CM

## 2023-08-07 LAB — INR BLDC: 1.9 (ref 0.9–1.1)

## 2023-08-07 PROCEDURE — 85610 PROTHROMBIN TIME: CPT

## 2023-08-11 ENCOUNTER — LAB ENCOUNTER (OUTPATIENT)
Dept: LAB | Age: 88
End: 2023-08-11
Attending: FAMILY MEDICINE
Payer: MEDICARE

## 2023-08-11 DIAGNOSIS — I10 HTN (HYPERTENSION): ICD-10-CM

## 2023-08-11 DIAGNOSIS — I48.91 ATRIAL FIBRILLATION (HCC): ICD-10-CM

## 2023-08-11 DIAGNOSIS — D64.9 ANEMIA, UNSPECIFIED: Primary | ICD-10-CM

## 2023-08-11 DIAGNOSIS — D69.6 THROMBOCYTOPENIA, UNSPECIFIED (HCC): ICD-10-CM

## 2023-08-11 DIAGNOSIS — Z79.899 ENCOUNTER FOR LONG-TERM (CURRENT) DRUG USE: ICD-10-CM

## 2023-08-11 LAB
ALBUMIN SERPL-MCNC: 4.3 G/DL (ref 3.4–5)
ALBUMIN/GLOB SERPL: 1.5 {RATIO} (ref 1–2)
ALP LIVER SERPL-CCNC: 86 U/L
ALT SERPL-CCNC: 29 U/L
ANION GAP SERPL CALC-SCNC: 1 MMOL/L (ref 0–18)
AST SERPL-CCNC: 21 U/L (ref 15–37)
BASOPHILS # BLD AUTO: 0.02 X10(3) UL (ref 0–0.2)
BASOPHILS NFR BLD AUTO: 0.6 %
BILIRUB SERPL-MCNC: 0.7 MG/DL (ref 0.1–2)
BUN BLD-MCNC: 22 MG/DL (ref 7–18)
CALCIUM BLD-MCNC: 9.4 MG/DL (ref 8.5–10.1)
CHLORIDE SERPL-SCNC: 108 MMOL/L (ref 98–112)
CO2 SERPL-SCNC: 31 MMOL/L (ref 21–32)
CREAT BLD-MCNC: 1.26 MG/DL
EGFRCR SERPLBLD CKD-EPI 2021: 41 ML/MIN/1.73M2 (ref 60–?)
EOSINOPHIL # BLD AUTO: 0.07 X10(3) UL (ref 0–0.7)
EOSINOPHIL NFR BLD AUTO: 2 %
ERYTHROCYTE [DISTWIDTH] IN BLOOD BY AUTOMATED COUNT: 13.3 %
FASTING STATUS PATIENT QL REPORTED: NO
FOLATE SERPL-MCNC: 17.5 NG/ML (ref 8.7–?)
GLOBULIN PLAS-MCNC: 2.8 G/DL (ref 2.8–4.4)
GLUCOSE BLD-MCNC: 94 MG/DL (ref 70–99)
HCT VFR BLD AUTO: 37.4 %
HGB BLD-MCNC: 11.8 G/DL
IMM GRANULOCYTES # BLD AUTO: 0.01 X10(3) UL (ref 0–1)
IMM GRANULOCYTES NFR BLD: 0.3 %
LYMPHOCYTES # BLD AUTO: 0.91 X10(3) UL (ref 1–4)
LYMPHOCYTES NFR BLD AUTO: 25.6 %
MCH RBC QN AUTO: 30.8 PG (ref 26–34)
MCHC RBC AUTO-ENTMCNC: 31.6 G/DL (ref 31–37)
MCV RBC AUTO: 97.7 FL
MONOCYTES # BLD AUTO: 0.28 X10(3) UL (ref 0.1–1)
MONOCYTES NFR BLD AUTO: 7.9 %
NEUTROPHILS # BLD AUTO: 2.27 X10 (3) UL (ref 1.5–7.7)
NEUTROPHILS # BLD AUTO: 2.27 X10(3) UL (ref 1.5–7.7)
NEUTROPHILS NFR BLD AUTO: 63.6 %
OSMOLALITY SERPL CALC.SUM OF ELEC: 293 MOSM/KG (ref 275–295)
PLATELET # BLD AUTO: 133 10(3)UL (ref 150–450)
POTASSIUM SERPL-SCNC: 4.4 MMOL/L (ref 3.5–5.1)
PROT SERPL-MCNC: 7.1 G/DL (ref 6.4–8.2)
RBC # BLD AUTO: 3.83 X10(6)UL
SODIUM SERPL-SCNC: 140 MMOL/L (ref 136–145)
WBC # BLD AUTO: 3.6 X10(3) UL (ref 4–11)

## 2023-08-11 PROCEDURE — 36415 COLL VENOUS BLD VENIPUNCTURE: CPT

## 2023-08-11 PROCEDURE — 82746 ASSAY OF FOLIC ACID SERUM: CPT

## 2023-08-11 PROCEDURE — 80053 COMPREHEN METABOLIC PANEL: CPT

## 2023-08-11 PROCEDURE — 85025 COMPLETE CBC W/AUTO DIFF WBC: CPT

## 2023-08-14 ENCOUNTER — HOSPITAL ENCOUNTER (OUTPATIENT)
Dept: LAB | Facility: HOSPITAL | Age: 88
Discharge: HOME | End: 2023-08-14
Attending: INTERNAL MEDICINE
Payer: MEDICARE

## 2023-08-14 DIAGNOSIS — I48.91 ATRIAL FIBRILLATION (HCC): ICD-10-CM

## 2023-08-14 LAB — INR BLDC: 2.6 (ref 0.9–1.1)

## 2023-08-14 PROCEDURE — 85610 PROTHROMBIN TIME: CPT

## 2023-08-29 ENCOUNTER — HOSPITAL ENCOUNTER (OUTPATIENT)
Dept: LAB | Facility: HOSPITAL | Age: 88
Discharge: HOME | End: 2023-08-29
Attending: INTERNAL MEDICINE
Payer: MEDICARE

## 2023-08-29 DIAGNOSIS — I48.91 ATRIAL FIBRILLATION (HCC): ICD-10-CM

## 2023-08-29 LAB — INR BLDC: 2.7 (ref 0.9–1.1)

## 2023-08-29 PROCEDURE — 85610 PROTHROMBIN TIME: CPT

## 2023-09-27 ENCOUNTER — HOSPITAL ENCOUNTER (OUTPATIENT)
Dept: LAB | Facility: HOSPITAL | Age: 88
Discharge: HOME | End: 2023-09-27
Attending: INTERNAL MEDICINE
Payer: MEDICARE

## 2023-09-27 DIAGNOSIS — I48.91 ATRIAL FIBRILLATION (HCC): ICD-10-CM

## 2023-09-27 LAB — INR BLDC: 2.5 (ref 0.9–1.1)

## 2023-09-27 PROCEDURE — 85610 PROTHROMBIN TIME: CPT

## 2023-10-04 ENCOUNTER — OFFICE VISIT (OUTPATIENT)
Dept: FAMILY MEDICINE CLINIC | Facility: CLINIC | Age: 88
End: 2023-10-04
Payer: MEDICARE

## 2023-10-04 VITALS
OXYGEN SATURATION: 96 % | HEART RATE: 66 BPM | WEIGHT: 208 LBS | BODY MASS INDEX: 29.12 KG/M2 | SYSTOLIC BLOOD PRESSURE: 163 MMHG | TEMPERATURE: 98 F | DIASTOLIC BLOOD PRESSURE: 73 MMHG | RESPIRATION RATE: 16 BRPM | HEIGHT: 71 IN

## 2023-10-04 DIAGNOSIS — H92.01 ACUTE OTALGIA, RIGHT: Primary | ICD-10-CM

## 2023-10-04 DIAGNOSIS — H61.21 IMPACTED CERUMEN OF RIGHT EAR: ICD-10-CM

## 2023-10-04 PROCEDURE — 99213 OFFICE O/P EST LOW 20 MIN: CPT | Performed by: FAMILY MEDICINE

## 2023-10-04 NOTE — PATIENT INSTRUCTIONS
Monitor symptoms for now. Use tylenol for pain as needed. If no better in 2-3 day days follow-up with PCP for further evaluation.

## 2023-10-09 ENCOUNTER — OFFICE VISIT (OUTPATIENT)
Dept: PODIATRY CLINIC | Facility: CLINIC | Age: 88
End: 2023-10-09

## 2023-10-09 DIAGNOSIS — L60.0 INGROWING TOENAIL: ICD-10-CM

## 2023-10-09 DIAGNOSIS — B35.1 ONYCHOMYCOSIS: ICD-10-CM

## 2023-10-09 DIAGNOSIS — L84 HELOMA MOLLE: ICD-10-CM

## 2023-10-09 DIAGNOSIS — L84 HELOMA DURUM: ICD-10-CM

## 2023-10-09 DIAGNOSIS — M20.42 HAMMER TOES OF BOTH FEET: ICD-10-CM

## 2023-10-09 DIAGNOSIS — M20.41 HAMMER TOES OF BOTH FEET: ICD-10-CM

## 2023-10-09 DIAGNOSIS — M79.674 PAIN IN TOES OF BOTH FEET: ICD-10-CM

## 2023-10-09 DIAGNOSIS — M79.675 PAIN IN TOES OF BOTH FEET: ICD-10-CM

## 2023-10-09 DIAGNOSIS — N18.30 STAGE 3 CHRONIC KIDNEY DISEASE, UNSPECIFIED WHETHER STAGE 3A OR 3B CKD (HCC): Primary | ICD-10-CM

## 2023-10-09 PROCEDURE — 99213 OFFICE O/P EST LOW 20 MIN: CPT | Performed by: PODIATRIST

## 2023-10-09 NOTE — PROGRESS NOTES
Phillip Tapia is a 80year old female. Patient presents with:  Toenail Care: 80year old female here for nail trimming, LOV was 7/26/23. Patient denies any pain in the office today. Patient is on blood thinners, nails are long and thick causing pain when weightbearing. HPI:   Patient returns to the clinic for routine foot care. She can no longer provide self-care. And painful corns. She is now complaining of a painful spot on the fourth toe she points to the area over the DIP joint of her fourth toe, right foot. And trimming of her toenails but she does complain of painful corns 1 at the tip of the third toe on the left and 1 at the tip of the fourth toe on the right. At today's visit reviewed nurse's history as taken above, allergies medications and medical history as documented below. All changes duly noted  Allergies: Celecoxib   Current Outpatient Medications   Medication Sig Dispense Refill    rosuvastatin 10 MG Oral Tab Take 1 tablet (10 mg total) by mouth daily. Omeprazole 40 MG Oral Capsule Delayed Release Take 1 capsule (40 mg total) by mouth before breakfast.      HYDROcodone-acetaminophen (NORCO) 5-325 MG Oral Tab Take 1-2 tablets by mouth every 6 (six) hours as needed for Pain. 30 tablet 0    Flecainide Acetate 100 MG Oral Tab Take 1 tablet (100 mg total) by mouth every 12 (twelve) hours. 60 tablet 5    Warfarin Sodium 5 MG Oral Tab Take 1 tablet (5 mg total) by mouth daily. 6 mg five day per week  2.5 mg 1 x a week      Losartan Potassium 25 MG Oral Tab Take 1 tablet (25 mg total) by mouth daily. Acetaminophen (TYLENOL ARTHRITIS EXT RELIEF OR) Take 2 tablets by mouth 2 (two) times daily. Vitamin B-12 500 MCG Oral Tab Take 0.5 tablets (250 mcg total) by mouth twice a week.       ferrous sulfate 325 (65 FE) MG Oral Tab EC Take 1 tablet (325 mg total) by mouth daily with breakfast. 3 times a week      Potassium Chloride Barbra CR 20 MEQ Oral Tab CR Take 1 tablet (20 mEq total) by mouth daily. Calcium Carbonate-Vitamin D (CALCIUM 600 + D OR) Take 1 tablet by mouth daily. Torsemide (DEMADEX) 20 MG Oral Tab Take 1 tablet (20 mg total) by mouth every other day. I tab on odd and 2 on even days      Metoprolol Succinate (TOPROL XL) 25 MG Oral Tablet SR 24 Hr Take 1 tablet (25 mg total) by mouth daily.  Half tablet until 6/13 then plans to discontinue        Past Medical History:   Diagnosis Date    Anal spasm     Arrhythmia 12/2017    atrial fibrillation    BLOOD CLOTS     Carotid atherosclerosis     Chronic anemia     Dermatophytosis of nail     Dizziness     End stage renal disease (HCC)     Esophageal reflux     Hearing impairment     HIGH BLOOD PRESSURE     HIGH CHOLESTEROL     Muscle weakness     Osteoarthrosis, unspecified whether generalized or localized, unspecified site     Other screening mammogram 06/03/2010    Postmenopausal bleeding 04/2023    Special screening for osteoporosis 06/04/2003    Uterine fibroid     Venous insufficiency     Visual impairment     glasses      Past Surgical History:   Procedure Laterality Date    CARDIOVERSION  11/2017    COLONOSCOPY      HIP REPLACEMENT SURGERY      LIG DIV&STRIPPING SHORT SAPHENOUS VEIN  1979    LIGATION OF HEMORRHOID(S)      MASTOID OBLITERATION      OTHER SURGICAL HISTORY      Mastoidectomy    OTHER SURGICAL HISTORY      Venous Ligation with Stripping    OTHER SURGICAL HISTORY      Venous Sclerosing By Injection    TONSILLECTOMY      TOTAL HIP REPLACEMENT Bilateral     12/2013 and 2018    [facility]IN CLINIC - [facility]        Family History   Problem Relation Age of Onset    Cancer Mother         ovarian    Other (congestive heart failure) Father     Cancer Maternal Grandmother         colon    Cancer Maternal Grandfather         colon    Breast Cancer Maternal Aunt 72    Breast Cancer Paternal Aunt 72    Cancer Maternal Uncle         prostate    Cancer Maternal Uncle         eye    Cancer Maternal Uncle         lung      Social History    Socioeconomic History      Marital status:     Tobacco Use      Smoking status: Never      Smokeless tobacco: Never    Vaping Use      Vaping Use: Never used    Substance and Sexual Activity      Alcohol use: No      Drug use: No    Other Topics      Concerns:        Caffeine Concern: Yes          OCC        Exercise: Yes          walks occ, PT exercises at  home          REVIEW OF SYSTEMS:   Today reviewed systens as documented below  GENERAL HEALTH: feels well otherwise  SKIN: Refer to exam below  RESPIRATORY: denies shortness of breath with exertion  CARDIOVASCULAR: denies chest pain on exertion  GI: denies abdominal pain and denies heartburn  NEURO: denies headaches    EXAM:   There were no vitals taken for this visit. GENERAL: well developed, well nourished, in no apparent distress  EXTREMITIES:   1. Integument: The skin on her feet was evaluated its warm and dry there are multiple telangiectasias on both feet due to venous stasis and varicosities. They are painful nucleated keratomas at the tip of the third toe left and tip of the fourth toe right secondary to adjacent hammertoe deformities. There are no sores ulcerations or other problems noted at this visit. The fourth toe she has developed a new corn over the DIP joint area secondary to a rigid hammertoe on the right foot. 2. Vascular: Patient has weakly palpable pulses dorsalis pedis and posterior tibial bilateral   3. Neurologic: The patient has intact sensorium   4. Musculoskeletal: The patient has hammertoes digits 2-5 bilateral feet was distal tip keratomas 3 left and 4 right. They do not reduce on forefoot loading. Today I could not elicit any neuroma-like symptoms in the third interspace of her right foot therefore I did not recommend a cortisone injection.     ASSESSMENT AND PLAN:   Diagnoses and all orders for this visit:    Stage 3 chronic kidney disease, unspecified whether stage 3a or 3b CKD ([facility] [address]    Alana durum    Hammer toes of both feet    Pain in toes of both feet    Ingrowing toenail    Onychomycosis    Heloma molle        Plan: Today's visit trimmed toenails 1-5 manually can be started on healthy tissue as possible using a 15 blade I trimmed and debrided the hyperkeratoses at the distal tip of the toes that were affected the third on the left and the fourth on the right alleviating the patient's symptoms. Again discussed with her proper fitting shoe gear and advised her to follow-up every 2 to 3 months for care but sooner if required. After trimming down all hyperkeratoses gave her silicone pads to wear on the fourth toe we will see how they work for her. Advised follow-up again in 2 to 3 months for care but sooner if required. The patient indicates understanding of these issues and agrees to the plan.     Evangelist Doan DPM

## 2023-10-25 ENCOUNTER — HOSPITAL ENCOUNTER (OUTPATIENT)
Dept: LAB | Facility: HOSPITAL | Age: 88
Discharge: HOME | End: 2023-10-25
Attending: INTERNAL MEDICINE

## 2023-10-25 DIAGNOSIS — I48.91 ATRIAL FIBRILLATION (HCC): ICD-10-CM

## 2023-10-25 LAB — INR BLDC: 2.9 (ref 0.9–1.1)

## 2023-10-25 PROCEDURE — 85610 PROTHROMBIN TIME: CPT

## 2023-11-22 ENCOUNTER — HOSPITAL ENCOUNTER (OUTPATIENT)
Dept: LAB | Facility: HOSPITAL | Age: 88
Discharge: HOME | End: 2023-11-22
Attending: INTERNAL MEDICINE
Payer: MEDICARE

## 2023-11-22 DIAGNOSIS — I48.91 ATRIAL FIBRILLATION (HCC): ICD-10-CM

## 2023-11-22 LAB — INR BLDC: 2.4 (ref 0.9–1.1)

## 2023-11-22 PROCEDURE — 85610 PROTHROMBIN TIME: CPT

## 2023-12-11 ENCOUNTER — LAB ENCOUNTER (OUTPATIENT)
Dept: LAB | Age: 88
End: 2023-12-11
Attending: FAMILY MEDICINE
Payer: MEDICARE

## 2023-12-11 DIAGNOSIS — Z79.899 ENCOUNTER FOR LONG-TERM (CURRENT) USE OF MEDICATIONS: ICD-10-CM

## 2023-12-11 DIAGNOSIS — N18.31 CHRONIC KIDNEY DISEASE, STAGE 3A (HCC): ICD-10-CM

## 2023-12-11 DIAGNOSIS — Z79.01 LONG TERM (CURRENT) USE OF ANTICOAGULANTS: ICD-10-CM

## 2023-12-11 DIAGNOSIS — I12.9 BENIGN HYPERTENSIVE RENAL DISEASE: ICD-10-CM

## 2023-12-11 DIAGNOSIS — I10 HYPERTENSION, UNSPECIFIED TYPE: ICD-10-CM

## 2023-12-11 DIAGNOSIS — I48.91 ATRIAL FIBRILLATION WITH RVR (HCC): ICD-10-CM

## 2023-12-11 DIAGNOSIS — D63.8 ANEMIA OF CHRONIC DISEASE: ICD-10-CM

## 2023-12-11 DIAGNOSIS — D69.6 THROMBOCYTOPENIA, UNSPECIFIED (HCC): ICD-10-CM

## 2023-12-11 LAB
ALBUMIN SERPL-MCNC: 4.1 G/DL (ref 3.4–5)
ALBUMIN/GLOB SERPL: 1.5 {RATIO} (ref 1–2)
ALP LIVER SERPL-CCNC: 89 U/L
ALT SERPL-CCNC: 22 U/L
ANION GAP SERPL CALC-SCNC: 1 MMOL/L (ref 0–18)
AST SERPL-CCNC: 15 U/L (ref 15–37)
BASOPHILS # BLD AUTO: 0.02 X10(3) UL (ref 0–0.2)
BASOPHILS NFR BLD AUTO: 0.6 %
BILIRUB SERPL-MCNC: 0.9 MG/DL (ref 0.1–2)
BUN BLD-MCNC: 19 MG/DL (ref 9–23)
CALCIUM BLD-MCNC: 9.1 MG/DL (ref 8.5–10.1)
CHLORIDE SERPL-SCNC: 110 MMOL/L (ref 98–112)
CO2 SERPL-SCNC: 30 MMOL/L (ref 21–32)
CREAT BLD-MCNC: 1.05 MG/DL
EGFRCR SERPLBLD CKD-EPI 2021: 51 ML/MIN/1.73M2 (ref 60–?)
EOSINOPHIL # BLD AUTO: 0.12 X10(3) UL (ref 0–0.7)
EOSINOPHIL NFR BLD AUTO: 3.5 %
ERYTHROCYTE [DISTWIDTH] IN BLOOD BY AUTOMATED COUNT: 13.7 %
FASTING STATUS PATIENT QL REPORTED: YES
GLOBULIN PLAS-MCNC: 2.7 G/DL (ref 2.8–4.4)
GLUCOSE BLD-MCNC: 90 MG/DL (ref 70–99)
HCT VFR BLD AUTO: 37.7 %
HGB BLD-MCNC: 11.8 G/DL
IMM GRANULOCYTES # BLD AUTO: 0.01 X10(3) UL (ref 0–1)
IMM GRANULOCYTES NFR BLD: 0.3 %
LYMPHOCYTES # BLD AUTO: 1 X10(3) UL (ref 1–4)
LYMPHOCYTES NFR BLD AUTO: 29 %
MCH RBC QN AUTO: 30.9 PG (ref 26–34)
MCHC RBC AUTO-ENTMCNC: 31.3 G/DL (ref 31–37)
MCV RBC AUTO: 98.7 FL
MONOCYTES # BLD AUTO: 0.28 X10(3) UL (ref 0.1–1)
MONOCYTES NFR BLD AUTO: 8.1 %
NEUTROPHILS # BLD AUTO: 2.02 X10 (3) UL (ref 1.5–7.7)
NEUTROPHILS # BLD AUTO: 2.02 X10(3) UL (ref 1.5–7.7)
NEUTROPHILS NFR BLD AUTO: 58.5 %
OSMOLALITY SERPL CALC.SUM OF ELEC: 294 MOSM/KG (ref 275–295)
PLATELET # BLD AUTO: 130 10(3)UL (ref 150–450)
POTASSIUM SERPL-SCNC: 4.1 MMOL/L (ref 3.5–5.1)
PROT SERPL-MCNC: 6.8 G/DL (ref 6.4–8.2)
RBC # BLD AUTO: 3.82 X10(6)UL
SODIUM SERPL-SCNC: 141 MMOL/L (ref 136–145)
WBC # BLD AUTO: 3.5 X10(3) UL (ref 4–11)

## 2023-12-11 PROCEDURE — 82668 ASSAY OF ERYTHROPOIETIN: CPT

## 2023-12-11 PROCEDURE — 36415 COLL VENOUS BLD VENIPUNCTURE: CPT

## 2023-12-11 PROCEDURE — 80053 COMPREHEN METABOLIC PANEL: CPT

## 2023-12-11 PROCEDURE — 85025 COMPLETE CBC W/AUTO DIFF WBC: CPT

## 2023-12-12 LAB — ERYTHROPOIETIN: 33.1 MIU/ML

## 2023-12-20 ENCOUNTER — HOSPITAL ENCOUNTER (OUTPATIENT)
Dept: LAB | Facility: HOSPITAL | Age: 88
Discharge: HOME | End: 2023-12-20
Attending: INTERNAL MEDICINE
Payer: MEDICARE

## 2023-12-20 DIAGNOSIS — I48.91 ATRIAL FIBRILLATION (HCC): ICD-10-CM

## 2023-12-20 LAB — INR BLDC: 1.8 (ref 0.9–1.1)

## 2023-12-20 PROCEDURE — 85610 PROTHROMBIN TIME: CPT

## 2023-12-28 ENCOUNTER — HOSPITAL ENCOUNTER (OUTPATIENT)
Dept: LAB | Facility: HOSPITAL | Age: 88
Discharge: HOME | End: 2023-12-28
Attending: INTERNAL MEDICINE
Payer: MEDICARE

## 2023-12-28 ENCOUNTER — OFFICE VISIT (OUTPATIENT)
Dept: PODIATRY CLINIC | Facility: CLINIC | Age: 88
End: 2023-12-28

## 2023-12-28 DIAGNOSIS — M20.42 HAMMER TOES OF BOTH FEET: ICD-10-CM

## 2023-12-28 DIAGNOSIS — I48.91 ATRIAL FIBRILLATION (HCC): ICD-10-CM

## 2023-12-28 DIAGNOSIS — M79.674 PAIN IN TOES OF BOTH FEET: ICD-10-CM

## 2023-12-28 DIAGNOSIS — L60.0 INGROWING TOENAIL: ICD-10-CM

## 2023-12-28 DIAGNOSIS — M20.41 HAMMER TOES OF BOTH FEET: ICD-10-CM

## 2023-12-28 DIAGNOSIS — B35.1 ONYCHOMYCOSIS: ICD-10-CM

## 2023-12-28 DIAGNOSIS — M79.675 PAIN IN TOES OF BOTH FEET: ICD-10-CM

## 2023-12-28 DIAGNOSIS — N18.30 STAGE 3 CHRONIC KIDNEY DISEASE, UNSPECIFIED WHETHER STAGE 3A OR 3B CKD (HCC): Primary | ICD-10-CM

## 2023-12-28 DIAGNOSIS — L84 HELOMA DURUM: ICD-10-CM

## 2023-12-28 LAB — INR BLDC: 2.6 (ref 0.9–1.1)

## 2023-12-28 PROCEDURE — 85610 PROTHROMBIN TIME: CPT

## 2023-12-28 PROCEDURE — 99213 OFFICE O/P EST LOW 20 MIN: CPT | Performed by: PODIATRIST

## 2024-01-02 NOTE — PROGRESS NOTES
Jacinda Butts is a 80year old female. Chief Complaint   Patient presents with    Toenail Care     Nail care and foot check         HPI:   Patient returns to the clinic for routine foot care. She can no longer provide self-care. She is getting ready to go to [place] soon. She wants to have everything trimmed down and debrided before she goes. At today's visit reviewed nurse's history as taken above, allergies medications and medical history as documented below. All changes duly noted  Allergies: Celecoxib   Current Outpatient Medications   Medication Sig Dispense Refill    rosuvastatin 10 MG Oral Tab Take 1 tablet (10 mg total) by mouth daily. Omeprazole 40 MG Oral Capsule Delayed Release Take 1 capsule (40 mg total) by mouth before breakfast.      HYDROcodone-acetaminophen (NORCO) 5-325 MG Oral Tab Take 1-2 tablets by mouth every 6 (six) hours as needed for Pain. 30 tablet 0    Flecainide Acetate 100 MG Oral Tab Take 1 tablet (100 mg total) by mouth every 12 (twelve) hours. 60 tablet 5    Warfarin Sodium 5 MG Oral Tab Take 1 tablet (5 mg total) by mouth daily. 6 mg five day per week  2.5 mg 1 x a week      Losartan Potassium 25 MG Oral Tab Take 1 tablet (25 mg total) by mouth daily. Acetaminophen (TYLENOL ARTHRITIS EXT RELIEF OR) Take 2 tablets by mouth 2 (two) times daily. Vitamin B-12 500 MCG Oral Tab Take 0.5 tablets (250 mcg total) by mouth twice a week. ferrous sulfate 325 (65 FE) MG Oral Tab EC Take 1 tablet (325 mg total) by mouth daily with breakfast. 3 times a week      Potassium Chloride Barbra CR 20 MEQ Oral Tab CR Take 1 tablet (20 mEq total) by mouth daily. Calcium Carbonate-Vitamin D (CALCIUM 600 + D OR) Take 1 tablet by mouth daily. Torsemide (DEMADEX) 20 MG Oral Tab Take 1 tablet (20 mg total) by mouth every other day. I tab on odd and 2 on even days      Metoprolol Succinate (TOPROL XL) 25 MG Oral Tablet SR 24 Hr Take 1 tablet (25 mg total) by mouth daily. Half tablet until 6/13 then plans to discontinue        Past Medical History:   Diagnosis Date    Anal spasm     Arrhythmia 12/2017    atrial fibrillation    BLOOD CLOTS     Carotid atherosclerosis     Chronic anemia     Dermatophytosis of nail     Dizziness     End stage renal disease (HCC)     Esophageal reflux     Hearing impairment     HIGH BLOOD PRESSURE     HIGH CHOLESTEROL     Muscle weakness     Osteoarthrosis, unspecified whether generalized or localized, unspecified site     Other screening mammogram 06/03/2010    Postmenopausal bleeding 04/2023    Special screening for osteoporosis 06/04/2003    Uterine fibroid     Venous insufficiency     Visual impairment     glasses      Past Surgical History:   Procedure Laterality Date    CARDIOVERSION  11/2017    COLONOSCOPY      HIP REPLACEMENT SURGERY      LIG DIV&STRIPPING SHORT SAPHENOUS VEIN  1979    LIGATION OF HEMORRHOID(S)      MASTOID OBLITERATION      OTHER SURGICAL HISTORY      Mastoidectomy    OTHER SURGICAL HISTORY      Venous Ligation with Stripping    OTHER SURGICAL HISTORY      Venous Sclerosing By Injection    TONSILLECTOMY      TOTAL HIP REPLACEMENT Bilateral     12/2013 and 2018    VEIN CLINIC - [facility]        Family History   Problem Relation Age of Onset    Cancer Mother         ovarian    Other (congestive heart failure) Father     Cancer Maternal Grandmother         colon    Cancer Maternal Grandfather         colon    Breast Cancer Maternal Aunt 72    Breast Cancer Paternal Aunt 72    Cancer Maternal Uncle         prostate    Cancer Maternal Uncle         eye    Cancer Maternal Uncle         lung      Social History     Socioeconomic History    Marital status:     Tobacco Use    Smoking status: Never    Smokeless tobacco: Never   Vaping Use    Vaping Use: Never used   Substance and Sexual Activity    Alcohol use: No    Drug use: No   Other Topics Concern    Caffeine Concern Yes     Comment: OCC    Exercise Yes     Comment: walks occ, PT exercises at  home           REVIEW OF SYSTEMS:   Today reviewed systens as documented below  GENERAL HEALTH: feels well otherwise  SKIN: Refer to exam below  RESPIRATORY: denies shortness of breath with exertion  CARDIOVASCULAR: denies chest pain on exertion  GI: denies abdominal pain and denies heartburn  NEURO: denies headaches    EXAM:   There were no vitals taken for this visit. GENERAL: well developed, well nourished, in no apparent distress  EXTREMITIES:   1. Integument: The skin on her feet was evaluated its warm and dry there are multiple telangiectasias on both feet due to venous stasis and varicosities. They are painful nucleated keratomas at the tip of the third toe left and tip of the fourth toe right secondary to adjacent hammertoe deformities. There are no sores ulcerations or other problems noted at this visit. The fourth toe she has developed a new corn over the DIP joint area secondary to a rigid hammertoe on the right foot. 2. Vascular: Patient has weakly palpable pulses dorsalis pedis and posterior tibial bilateral   3. Neurologic: The patient has intact sensorium   4. Musculoskeletal: The patient has hammertoes digits 2-5 bilateral feet was distal tip keratomas 3 left and 4 right. They do not reduce on forefoot loading. Today I could not elicit any neuroma-like symptoms in the third interspace of her right foot therefore I did not recommend a cortisone injection. ASSESSMENT AND PLAN:   Diagnoses and all orders for this visit:    Stage 3 chronic kidney disease, unspecified whether stage 3a or 3b CKD (HCC)    Hammer toes of both feet    Heloma durum    Pain in toes of both feet    Ingrowing toenail    Onychomycosis        Plan:  Today's visit trimmed toenails 1-5 manually can be started on healthy tissue as possible using a 15 blade I trimmed and debrided the hyperkeratoses at the distal tip of the toes that were affected the third on the left and the fourth on the right alleviating the patient's symptoms. Again discussed with her proper fitting shoe gear and advised her to follow-up every 2 to 3 months for care but sooner if required. After trimming down all hyperkeratoses gave her silicone pads to wear on the fourth toe we will see how they work for her. Advised follow-up again in 2 to 3 months for care but sooner if required. The patient indicates understanding of these issues and agrees to the plan.     Ade Dela Cruz DPM

## 2024-01-05 ENCOUNTER — HOSPITAL ENCOUNTER (OUTPATIENT)
Dept: BONE DENSITY | Age: 89
Discharge: HOME | End: 2024-01-05
Attending: FAMILY MEDICINE
Payer: MEDICARE

## 2024-01-05 ENCOUNTER — HOSPITAL ENCOUNTER (OUTPATIENT)
Dept: MAMMOGRAPHY | Age: 89
Discharge: HOME | End: 2024-01-05
Attending: FAMILY MEDICINE
Payer: MEDICARE

## 2024-01-05 DIAGNOSIS — M81.0 OSTEOPOROSIS: ICD-10-CM

## 2024-01-05 DIAGNOSIS — M85.80 OSTEOPENIA: ICD-10-CM

## 2024-01-05 DIAGNOSIS — Z12.31 ENCOUNTER FOR SCREENING MAMMOGRAM FOR MALIGNANT NEOPLASM OF BREAST: ICD-10-CM

## 2024-01-05 PROCEDURE — 77067 SCR MAMMO BI INCL CAD: CPT | Performed by: FAMILY MEDICINE

## 2024-01-05 PROCEDURE — 77080 DXA BONE DENSITY AXIAL: CPT | Performed by: FAMILY MEDICINE

## 2024-01-05 PROCEDURE — 77063 BREAST TOMOSYNTHESIS BI: CPT | Performed by: FAMILY MEDICINE

## 2024-04-04 ENCOUNTER — HOSPITAL ENCOUNTER (OUTPATIENT)
Dept: MAMMOGRAPHY | Facility: HOSPITAL | Age: 89
Discharge: HOME | End: 2024-04-04
Attending: FAMILY MEDICINE
Payer: MEDICARE

## 2024-04-04 ENCOUNTER — HOSPITAL ENCOUNTER (OUTPATIENT)
Dept: LAB | Facility: HOSPITAL | Age: 89
Discharge: HOME | End: 2024-04-04
Attending: FAMILY MEDICINE
Payer: MEDICARE

## 2024-04-04 DIAGNOSIS — R92.2 INCONCLUSIVE MAMMOGRAM: ICD-10-CM

## 2024-04-04 DIAGNOSIS — I48.91 ATRIAL FIBRILLATION (HCC): ICD-10-CM

## 2024-04-04 LAB — INR BLDC: 2 (ref 0.9–1.1)

## 2024-04-04 PROCEDURE — 77061 BREAST TOMOSYNTHESIS UNI: CPT | Performed by: FAMILY MEDICINE

## 2024-04-04 PROCEDURE — 85610 PROTHROMBIN TIME: CPT

## 2024-04-04 PROCEDURE — 77065 DX MAMMO INCL CAD UNI: CPT | Performed by: FAMILY MEDICINE

## 2024-04-04 PROCEDURE — 76642 ULTRASOUND BREAST LIMITED: CPT | Performed by: FAMILY MEDICINE

## 2024-04-09 NOTE — PROGRESS NOTES
[facility]  [facility]  [address]  [facility] [address]  Dept: [phone]  Loc: [phone]    Visit Date: 4/9/2024    Jenifer Gonzales a 78 y.o. female who presents today for:   Chief Complaint   Patient presents with    Cough     Patient states symptoms started Sunday night.     Dizziness    Otalgia     HPI:     Ill exposure - starts she has an \"allergic reaction to the weather\"    Started Sunday feeling poorly - cough, ear pain (left), sore throat, PND    Started taking Mucinex and using Netti-pot     Non-smoker    HPI  Health Maintenance   Topic Date Due    Shingles vaccine (1 of 2) Never done    Respiratory Syncytial Virus (RSV) Pregnant or age 60 yrs+ (1 - 1-dose 60+ series) Never done    COVID-19 Vaccine (6 - 2023-24 season) 09/01/2023    Depression Screen  11/16/2024    Annual Wellness Visit (Medicare)  11/16/2024    DTaP/Tdap/Td vaccine (2 - Td or Tdap) 10/12/2030    DEXA (modify frequency per FRAX score)  Completed    Flu vaccine  Completed    Pneumococcal 65+ years Vaccine  Completed    Hepatitis C screen  Completed    Hepatitis A vaccine  Aged Out    Hepatitis B vaccine  Aged Out    Hib vaccine  Aged Out    Polio vaccine  Aged Out    Meningococcal (ACWY) vaccine  Aged Out    A1C test (Diabetic or Prediabetic)  Discontinued    Lipids  Discontinued    Breast cancer screen  Discontinued    Colonoscopy  Discontinued       Current Outpatient Medications   Medication Sig Dispense Refill    doxycycline hyclate (VIBRA-TABS) 100 MG tablet Take 1 tablet by mouth 2 times daily for 5 days 10 tablet 0    zoster recombinant adjuvanted vaccine (SHINGRIX) 50 MCG/0.5ML SUSR injection Inject 0.5 mLs into the muscle See Admin Instructions 1 dose now and repeat in 2-6 months 0.5 mL 0    albuterol sulfate HFA (VENTOLIN HFA) 108 (90 Base) MCG/ACT inhaler Inhale 2 puffs into the lungs 4 times daily as needed for Wheezing 18 g 0    fluticasone (FLONASE) 50 MCG/ACT nasal spray  [address] Patient Status:  Inpatient    1934 MRN [id number]   [facility] 3SW-A Attending Sharlene Millard MD   [facility] Day # 2 PCP Angelica Holt MD     Subjective:  LeftTotal Hip Arthroplasty  Systemic or

## 2024-04-11 ENCOUNTER — HOSPITAL ENCOUNTER (OUTPATIENT)
Dept: LAB | Facility: HOSPITAL | Age: 89
Discharge: HOME | End: 2024-04-11
Attending: INTERNAL MEDICINE
Payer: MEDICARE

## 2024-04-11 DIAGNOSIS — I48.91 ATRIAL FIBRILLATION (HCC): ICD-10-CM

## 2024-04-11 LAB — INR BLDC: 2.4 (ref 0.9–1.1)

## 2024-04-11 PROCEDURE — 85610 PROTHROMBIN TIME: CPT

## 2024-04-17 ENCOUNTER — OFFICE VISIT (OUTPATIENT)
Dept: PODIATRY CLINIC | Facility: CLINIC | Age: 89
End: 2024-04-17

## 2024-04-17 DIAGNOSIS — N18.30 STAGE 3 CHRONIC KIDNEY DISEASE, UNSPECIFIED WHETHER STAGE 3A OR 3B CKD (HCC): Primary | Chronic | ICD-10-CM

## 2024-04-17 DIAGNOSIS — B35.1 ONYCHOMYCOSIS: ICD-10-CM

## 2024-04-17 DIAGNOSIS — M79.674 PAIN IN TOES OF BOTH FEET: ICD-10-CM

## 2024-04-17 DIAGNOSIS — L84 HELOMA DURUM: ICD-10-CM

## 2024-04-17 DIAGNOSIS — M20.41 HAMMER TOES OF BOTH FEET: ICD-10-CM

## 2024-04-17 DIAGNOSIS — M20.42 HAMMER TOES OF BOTH FEET: ICD-10-CM

## 2024-04-17 DIAGNOSIS — L84 HELOMA MOLLE: ICD-10-CM

## 2024-04-17 DIAGNOSIS — L60.0 INGROWING TOENAIL: ICD-10-CM

## 2024-04-17 DIAGNOSIS — M79.675 PAIN IN TOES OF BOTH FEET: ICD-10-CM

## 2024-04-17 PROCEDURE — 99213 OFFICE O/P EST LOW 20 MIN: CPT | Performed by: PODIATRIST

## 2024-04-21 NOTE — PROGRESS NOTES
Guerita Birmingham is a 89 year old female.   Chief Complaint   Patient presents with    Toenail Care     Nail care and foot check         HPI:   Patient returns to the clinic for routine foot care.  She can no longer provide self-care.  Patient indicates she cannot provide self-care. At today's visit reviewed nurse's history as taken above, allergies medications and medical history as documented below.  All changes duly noted  Allergies: Celecoxib   Current Outpatient Medications   Medication Sig Dispense Refill    rosuvastatin 10 MG Oral Tab Take 1 tablet (10 mg total) by mouth daily.      Omeprazole 40 MG Oral Capsule Delayed Release Take 1 capsule (40 mg total) by mouth before breakfast.      HYDROcodone-acetaminophen (NORCO) 5-325 MG Oral Tab Take 1-2 tablets by mouth every 6 (six) hours as needed for Pain. 30 tablet 0    Flecainide Acetate 100 MG Oral Tab Take 1 tablet (100 mg total) by mouth every 12 (twelve) hours. 60 tablet 5    Warfarin Sodium 5 MG Oral Tab Take 1 tablet (5 mg total) by mouth daily. 6 mg five day per week  2.5 mg 1 x a week      Losartan Potassium 25 MG Oral Tab Take 1 tablet (25 mg total) by mouth daily.      Acetaminophen (TYLENOL ARTHRITIS EXT RELIEF OR) Take 2 tablets by mouth 2 (two) times daily.      Vitamin B-12 500 MCG Oral Tab Take 0.5 tablets (250 mcg total) by mouth twice a week.      ferrous sulfate 325 (65 FE) MG Oral Tab EC Take 1 tablet (325 mg total) by mouth daily with breakfast. 3 times a week      Potassium Chloride Barbra CR 20 MEQ Oral Tab CR Take 1 tablet (20 mEq total) by mouth daily.      Calcium Carbonate-Vitamin D (CALCIUM 600 + D OR) Take 1 tablet by mouth daily.      Torsemide (DEMADEX) 20 MG Oral Tab Take 1 tablet (20 mg total) by mouth every other day. I tab on odd and 2 on even days      Metoprolol Succinate (TOPROL XL) 25 MG Oral Tablet SR 24 Hr Take 1 tablet (25 mg total) by mouth daily. Half tablet until 6/13 then plans to discontinue (Patient not taking:  Reported on 4/17/2024)        Past Medical History:    Anal spasm    Arrhythmia    atrial fibrillation    BLOOD CLOTS    Carotid atherosclerosis    Chronic anemia    Dermatophytosis of nail    Dizziness    End stage renal disease (HCC)    Esophageal reflux    Hearing impairment    HIGH BLOOD PRESSURE    HIGH CHOLESTEROL    Muscle weakness    Osteoarthrosis, unspecified whether generalized or localized, unspecified site    Other screening mammogram    Postmenopausal bleeding    Special screening for osteoporosis    Uterine fibroid    Venous insufficiency    Visual impairment    glasses      Past Surgical History:   Procedure Laterality Date    Cardioversion  11/2017    Colonoscopy      Hip replacement surgery      Lig div&stripping short saphenous vein  1979    Ligation of hemorrhoid(s)      Mastoid obliteration      Other surgical history      Mastoidectomy    Other surgical history      Venous Ligation with Stripping    Other surgical history      Venous Sclerosing By Injection    Tonsillectomy      Total hip replacement Bilateral     12/2013 and 2018    Vein clinic - dmg        Family History   Problem Relation Age of Onset    Cancer Mother         ovarian    Other (congestive heart failure) Father     Cancer Maternal Grandmother         colon    Cancer Maternal Grandfather         colon    Breast Cancer Maternal Aunt 65    Breast Cancer Paternal Aunt 65    Cancer Maternal Uncle         prostate    Cancer Maternal Uncle         eye    Cancer Maternal Uncle         lung      Social History     Socioeconomic History    Marital status:    Tobacco Use    Smoking status: Never    Smokeless tobacco: Never   Vaping Use    Vaping status: Never Used   Substance and Sexual Activity    Alcohol use: No    Drug use: No   Other Topics Concern    Caffeine Concern Yes     Comment: OCC    Exercise Yes     Comment: walks occ, PT exercises at  home           REVIEW OF SYSTEMS:   Today reviewed systens as documented  below  GENERAL HEALTH: feels well otherwise  SKIN: Refer to exam below  RESPIRATORY: denies shortness of breath with exertion  CARDIOVASCULAR: denies chest pain on exertion  GI: denies abdominal pain and denies heartburn  NEURO: denies headaches    EXAM:   There were no vitals taken for this visit.  GENERAL: well developed, well nourished, in no apparent distress  EXTREMITIES:   1. Integument: The skin on her feet was evaluated its warm and dry there are multiple telangiectasias on both feet due to venous stasis and varicosities.  They are painful nucleated keratomas at the tip of the third toe left and tip of the fourth toe right secondary to adjacent hammertoe deformities.  There are no sores ulcerations or other problems noted at this visit.  The fourth toe she has developed a new corn over the DIP joint area secondary to a rigid hammertoe on the right foot.   2. Vascular: Patient has weakly palpable pulses dorsalis pedis and posterior tibial bilateral   3. Neurologic: The patient has intact sensorium   4. Musculoskeletal: The patient has hammertoes digits 2-5 bilateral feet was distal tip keratomas 3 left and 4 right.  They do not reduce on forefoot loading.  Today I could not elicit any neuroma-like symptoms in the third interspace of her right foot therefore I did not recommend a cortisone injection.    ASSESSMENT AND PLAN:   Diagnoses and all orders for this visit:    Stage 3 chronic kidney disease, unspecified whether stage 3a or 3b CKD (HCC)    Hammer toes of both feet    Heloma durum    Pain in toes of both feet    Ingrowing toenail    Onychomycosis    Heloma molle        Plan: Today's visit trimmed toenails 1-5 manually can be started on healthy tissue as possible using a 15 blade I trimmed and debrided the hyperkeratoses at the distal tip of the toes that were affected the third on the left and the fourth on the right alleviating the patient's symptoms.  Again discussed with her proper fitting shoe gear  and advised her to follow-up every 2 to 3 months for care but sooner if required.  After trimming down all hyperkeratoses gave her silicone pads to wear on the fourth toe we will see how they work for her.  Advised follow-up again in 2 to 3 months for care but sooner if required.  Dispensed pads for proper offloading.  The patient indicates understanding of these issues and agrees to the plan.    Girma Dc DPM

## 2024-05-01 ENCOUNTER — HOSPITAL ENCOUNTER (OUTPATIENT)
Dept: LAB | Facility: HOSPITAL | Age: 89
Discharge: HOME | End: 2024-05-01
Attending: INTERNAL MEDICINE
Payer: MEDICARE

## 2024-05-01 DIAGNOSIS — I48.91 ATRIAL FIBRILLATION (HCC): ICD-10-CM

## 2024-05-01 LAB — INR BLDC: 2.7 (ref 0.9–1.1)

## 2024-05-01 PROCEDURE — 85610 PROTHROMBIN TIME: CPT

## 2024-05-06 ENCOUNTER — LAB ENCOUNTER (OUTPATIENT)
Dept: LAB | Age: 89
End: 2024-05-06
Attending: FAMILY MEDICINE
Payer: MEDICARE

## 2024-05-06 DIAGNOSIS — I10 HYPERTENSION, UNSPECIFIED TYPE: ICD-10-CM

## 2024-05-06 DIAGNOSIS — I12.9 BENIGN HYPERTENSIVE RENAL DISEASE: ICD-10-CM

## 2024-05-06 DIAGNOSIS — D64.9 ANEMIA, UNSPECIFIED TYPE: ICD-10-CM

## 2024-05-06 DIAGNOSIS — I48.91 ATRIAL FIBRILLATION WITH RVR (HCC): ICD-10-CM

## 2024-05-06 DIAGNOSIS — Z79.899 ENCOUNTER FOR LONG-TERM (CURRENT) DRUG USE: ICD-10-CM

## 2024-05-06 DIAGNOSIS — D69.6 THROMBOCYTOPENIA, UNSPECIFIED (HCC): ICD-10-CM

## 2024-05-06 LAB
ALBUMIN SERPL-MCNC: 4.2 G/DL (ref 3.4–5)
ALBUMIN/GLOB SERPL: 1.4 {RATIO} (ref 1–2)
ALP LIVER SERPL-CCNC: 86 U/L
ALT SERPL-CCNC: 27 U/L
ANION GAP SERPL CALC-SCNC: 5 MMOL/L (ref 0–18)
AST SERPL-CCNC: 24 U/L (ref 15–37)
BASOPHILS # BLD AUTO: 0.02 X10(3) UL (ref 0–0.2)
BASOPHILS NFR BLD AUTO: 0.5 %
BILIRUB SERPL-MCNC: 0.8 MG/DL (ref 0.1–2)
BUN BLD-MCNC: 19 MG/DL (ref 9–23)
CALCIUM BLD-MCNC: 9.5 MG/DL (ref 8.5–10.1)
CHLORIDE SERPL-SCNC: 108 MMOL/L (ref 98–112)
CO2 SERPL-SCNC: 29 MMOL/L (ref 21–32)
CREAT BLD-MCNC: 1.1 MG/DL
EGFRCR SERPLBLD CKD-EPI 2021: 48 ML/MIN/1.73M2 (ref 60–?)
EOSINOPHIL # BLD AUTO: 0.14 X10(3) UL (ref 0–0.7)
EOSINOPHIL NFR BLD AUTO: 3.8 %
ERYTHROCYTE [DISTWIDTH] IN BLOOD BY AUTOMATED COUNT: 14.2 %
FASTING STATUS PATIENT QL REPORTED: YES
FOLATE SERPL-MCNC: 18.2 NG/ML (ref 8.7–?)
GLOBULIN PLAS-MCNC: 2.9 G/DL (ref 2.8–4.4)
GLUCOSE BLD-MCNC: 88 MG/DL (ref 70–99)
HCT VFR BLD AUTO: 40.2 %
HGB BLD-MCNC: 12.4 G/DL
IMM GRANULOCYTES # BLD AUTO: 0 X10(3) UL (ref 0–1)
IMM GRANULOCYTES NFR BLD: 0 %
LYMPHOCYTES # BLD AUTO: 0.99 X10(3) UL (ref 1–4)
LYMPHOCYTES NFR BLD AUTO: 26.5 %
MCH RBC QN AUTO: 30.2 PG (ref 26–34)
MCHC RBC AUTO-ENTMCNC: 30.8 G/DL (ref 31–37)
MCV RBC AUTO: 98 FL
MONOCYTES # BLD AUTO: 0.37 X10(3) UL (ref 0.1–1)
MONOCYTES NFR BLD AUTO: 9.9 %
NEUTROPHILS # BLD AUTO: 2.21 X10 (3) UL (ref 1.5–7.7)
NEUTROPHILS # BLD AUTO: 2.21 X10(3) UL (ref 1.5–7.7)
NEUTROPHILS NFR BLD AUTO: 59.3 %
OSMOLALITY SERPL CALC.SUM OF ELEC: 296 MOSM/KG (ref 275–295)
PLATELET # BLD AUTO: 144 10(3)UL (ref 150–450)
POTASSIUM SERPL-SCNC: 4.1 MMOL/L (ref 3.5–5.1)
PROT SERPL-MCNC: 7.1 G/DL (ref 6.4–8.2)
RBC # BLD AUTO: 4.1 X10(6)UL
SODIUM SERPL-SCNC: 142 MMOL/L (ref 136–145)
WBC # BLD AUTO: 3.7 X10(3) UL (ref 4–11)

## 2024-05-06 PROCEDURE — 82746 ASSAY OF FOLIC ACID SERUM: CPT

## 2024-05-06 PROCEDURE — 36415 COLL VENOUS BLD VENIPUNCTURE: CPT

## 2024-05-06 PROCEDURE — 85025 COMPLETE CBC W/AUTO DIFF WBC: CPT

## 2024-05-06 PROCEDURE — 80053 COMPREHEN METABOLIC PANEL: CPT

## 2024-05-30 ENCOUNTER — HOSPITAL ENCOUNTER (OUTPATIENT)
Dept: LAB | Facility: HOSPITAL | Age: 89
Discharge: HOME | End: 2024-05-30
Attending: INTERNAL MEDICINE
Payer: MEDICARE

## 2024-05-30 DIAGNOSIS — I48.91 ATRIAL FIBRILLATION (HCC): ICD-10-CM

## 2024-05-30 LAB — INR BLDC: 2.4 (ref 0.9–1.1)

## 2024-05-30 PROCEDURE — 85610 PROTHROMBIN TIME: CPT

## 2024-06-13 ENCOUNTER — HOSPITAL ENCOUNTER (OUTPATIENT)
Dept: LAB | Facility: HOSPITAL | Age: 89
Discharge: HOME | End: 2024-06-13
Attending: INTERNAL MEDICINE

## 2024-06-13 DIAGNOSIS — I48.91 ATRIAL FIBRILLATION (HCC): ICD-10-CM

## 2024-06-13 LAB — INR BLDC: 2 (ref 0.9–1.1)

## 2024-06-13 PROCEDURE — 85610 PROTHROMBIN TIME: CPT

## 2024-06-18 ENCOUNTER — OFFICE VISIT (OUTPATIENT)
Dept: PODIATRY CLINIC | Facility: CLINIC | Age: 89
End: 2024-06-18

## 2024-06-18 DIAGNOSIS — L60.0 INGROWING TOENAIL: ICD-10-CM

## 2024-06-18 DIAGNOSIS — M79.674 PAIN IN TOES OF BOTH FEET: ICD-10-CM

## 2024-06-18 DIAGNOSIS — M20.42 HAMMER TOES OF BOTH FEET: ICD-10-CM

## 2024-06-18 DIAGNOSIS — L84 HELOMA DURUM: ICD-10-CM

## 2024-06-18 DIAGNOSIS — M79.675 PAIN IN TOES OF BOTH FEET: ICD-10-CM

## 2024-06-18 DIAGNOSIS — N18.30 STAGE 3 CHRONIC KIDNEY DISEASE, UNSPECIFIED WHETHER STAGE 3A OR 3B CKD (HCC): Primary | ICD-10-CM

## 2024-06-18 DIAGNOSIS — M20.41 HAMMER TOES OF BOTH FEET: ICD-10-CM

## 2024-06-18 PROCEDURE — 99213 OFFICE O/P EST LOW 20 MIN: CPT | Performed by: PODIATRIST

## 2024-06-18 NOTE — PROGRESS NOTES
Guerita Birmingham is a 89 year old female.   Chief Complaint   Patient presents with    Toenail Care     Nail care and foot check         HPI:   Patient returns to the clinic for routine foot care.  She can no longer provide self-care.  She is complaining of very painful fourth and fifth toes.  At today's visit reviewed nurse's history as taken above, allergies medications and medical history as documented below.  All changes duly noted  Allergies: Celecoxib   Current Outpatient Medications   Medication Sig Dispense Refill    rosuvastatin 10 MG Oral Tab Take 1 tablet (10 mg total) by mouth daily.      Omeprazole 40 MG Oral Capsule Delayed Release Take 1 capsule (40 mg total) by mouth before breakfast.      HYDROcodone-acetaminophen (NORCO) 5-325 MG Oral Tab Take 1-2 tablets by mouth every 6 (six) hours as needed for Pain. 30 tablet 0    Flecainide Acetate 100 MG Oral Tab Take 1 tablet (100 mg total) by mouth every 12 (twelve) hours. 60 tablet 5    Warfarin Sodium 5 MG Oral Tab Take 1 tablet (5 mg total) by mouth daily. 6 mg five day per week  2.5 mg 1 x a week      Losartan Potassium 25 MG Oral Tab Take 1 tablet (25 mg total) by mouth daily.      Acetaminophen (TYLENOL ARTHRITIS EXT RELIEF OR) Take 2 tablets by mouth 2 (two) times daily.      Vitamin B-12 500 MCG Oral Tab Take 0.5 tablets (250 mcg total) by mouth twice a week.      ferrous sulfate 325 (65 FE) MG Oral Tab EC Take 1 tablet (325 mg total) by mouth daily with breakfast. 3 times a week      Potassium Chloride Barbra CR 20 MEQ Oral Tab CR Take 1 tablet (20 mEq total) by mouth daily.      Calcium Carbonate-Vitamin D (CALCIUM 600 + D OR) Take 1 tablet by mouth daily.      Torsemide (DEMADEX) 20 MG Oral Tab Take 1 tablet (20 mg total) by mouth every other day. I tab on odd and 2 on even days      Metoprolol Succinate (TOPROL XL) 25 MG Oral Tablet SR 24 Hr Take 1 tablet (25 mg total) by mouth daily. Half tablet until 6/13 then plans to discontinue (Patient  not taking: Reported on 4/17/2024)        Past Medical History:    Anal spasm    Arrhythmia    atrial fibrillation    BLOOD CLOTS    Carotid atherosclerosis    Chronic anemia    Dermatophytosis of nail    Dizziness    End stage renal disease (HCC)    Esophageal reflux    Hearing impairment    HIGH BLOOD PRESSURE    HIGH CHOLESTEROL    Muscle weakness    Osteoarthrosis, unspecified whether generalized or localized, unspecified site    Other screening mammogram    Postmenopausal bleeding    Special screening for osteoporosis    Uterine fibroid    Venous insufficiency    Visual impairment    glasses      Past Surgical History:   Procedure Laterality Date    Cardioversion  11/2017    Colonoscopy      Hip replacement surgery      Lig div&stripping short saphenous vein  1979    Ligation of hemorrhoid(s)      Mastoid obliteration      Other surgical history      Mastoidectomy    Other surgical history      Venous Ligation with Stripping    Other surgical history      Venous Sclerosing By Injection    Tonsillectomy      Total hip replacement Bilateral     12/2013 and 2018    Vein clinic - dmg        Family History   Problem Relation Age of Onset    Cancer Mother         ovarian    Other (congestive heart failure) Father     Cancer Maternal Grandmother         colon    Cancer Maternal Grandfather         colon    Breast Cancer Maternal Aunt 65    Breast Cancer Paternal Aunt 65    Cancer Maternal Uncle         prostate    Cancer Maternal Uncle         eye    Cancer Maternal Uncle         lung      Social History     Socioeconomic History    Marital status:    Tobacco Use    Smoking status: Never    Smokeless tobacco: Never   Vaping Use    Vaping status: Never Used   Substance and Sexual Activity    Alcohol use: No    Drug use: No   Other Topics Concern    Caffeine Concern Yes     Comment: OCC    Exercise Yes     Comment: walks occ, PT exercises at  home           REVIEW OF SYSTEMS:   Today reviewed systens as  documented below  GENERAL HEALTH: feels well otherwise  SKIN: Refer to exam below  RESPIRATORY: denies shortness of breath with exertion  CARDIOVASCULAR: denies chest pain on exertion  GI: denies abdominal pain and denies heartburn  NEURO: denies headaches    EXAM:   There were no vitals taken for this visit.  GENERAL: well developed, well nourished, in no apparent distress  EXTREMITIES:   1. Integument: The skin on her feet was evaluated its warm and dry there are multiple telangiectasias on both feet due to venous stasis and varicosities.  They are painful nucleated keratomas at the tip of the third toe left and tip of the fourth toe right secondary to adjacent hammertoe deformities.  There are no sores ulcerations or other problems noted at this visit.  The fourth toe she has developed a new corn over the DIP joint area secondary to a rigid hammertoe on the right foot.  After trimming of the corn it looks like it is preulcerative but there is no erythema no edema no sign of infection noted.   2. Vascular: Patient has weakly palpable pulses dorsalis pedis and posterior tibial bilateral   3. Neurologic: The patient has intact sensorium   4. Musculoskeletal: The patient has hammertoes digits 2-5 bilateral feet was distal tip keratomas 3 left and 4 right.  They do not reduce on forefoot loading.  Today I could not elicit any neuroma-like symptoms in the third interspace of her right foot therefore I did not recommend a cortisone injection.    ASSESSMENT AND PLAN:   Diagnoses and all orders for this visit:    Stage 3 chronic kidney disease, unspecified whether stage 3a or 3b CKD (HCC)    Hammer toes of both feet    Heloma durum    Pain in toes of both feet    Ingrowing toenail        Plan: Today's visit trimmed toenails 1-5 manually can be started on healthy tissue as possible using a 15 blade I trimmed and debrided the hyperkeratoses at the distal tip of the toes that were affected the third on the left and the  fourth on the right alleviating the patient's symptoms.  Again discussed with her proper fitting shoe gear and advised her to follow-up every 2 to 3 months for care but sooner if required.  After trimming down all hyperkeratoses gave her silicone pads to wear on the fourth toe we will see how they work for her.  Advised follow-up again in 2 to 3 months for care but sooner if required.  Dispensed pads for proper offloading.  The patient indicates understanding of these issues and agrees to the plan.    Girma Dc DPM

## 2024-06-20 ENCOUNTER — HOSPITAL ENCOUNTER (OUTPATIENT)
Dept: LAB | Facility: HOSPITAL | Age: 89
Discharge: HOME | End: 2024-06-20
Attending: INTERNAL MEDICINE

## 2024-06-20 LAB — INR BLDC: 2.4 (ref 0.9–1.1)

## 2024-06-20 PROCEDURE — 85610 PROTHROMBIN TIME: CPT | Performed by: INTERNAL MEDICINE

## 2024-07-19 ENCOUNTER — HOSPITAL ENCOUNTER (OUTPATIENT)
Dept: LAB | Facility: HOSPITAL | Age: 89
Discharge: HOME | End: 2024-07-19
Attending: INTERNAL MEDICINE
Payer: MEDICARE

## 2024-07-19 DIAGNOSIS — I48.91 ATRIAL FIBRILLATION (HCC): ICD-10-CM

## 2024-07-19 LAB — INR BLDC: 2.9 (ref 0.9–1.1)

## 2024-07-19 PROCEDURE — 85610 PROTHROMBIN TIME: CPT

## 2024-08-15 ENCOUNTER — HOSPITAL ENCOUNTER (OUTPATIENT)
Dept: LAB | Facility: HOSPITAL | Age: 89
Discharge: HOME | End: 2024-08-15
Attending: INTERNAL MEDICINE
Payer: MEDICARE

## 2024-08-15 DIAGNOSIS — I48.91 ATRIAL FIBRILLATION (HCC): ICD-10-CM

## 2024-08-15 LAB — INR BLDC: 2.6 (ref 0.9–1.1)

## 2024-08-15 PROCEDURE — 85610 PROTHROMBIN TIME: CPT

## 2024-08-20 ENCOUNTER — OFFICE VISIT (OUTPATIENT)
Dept: PODIATRY CLINIC | Facility: CLINIC | Age: 89
End: 2024-08-20

## 2024-08-20 DIAGNOSIS — B35.1 ONYCHOMYCOSIS: ICD-10-CM

## 2024-08-20 DIAGNOSIS — N18.30 STAGE 3 CHRONIC KIDNEY DISEASE, UNSPECIFIED WHETHER STAGE 3A OR 3B CKD (HCC): Primary | ICD-10-CM

## 2024-08-20 DIAGNOSIS — M20.41 HAMMER TOES OF BOTH FEET: ICD-10-CM

## 2024-08-20 DIAGNOSIS — L84 HELOMA DURUM: ICD-10-CM

## 2024-08-20 DIAGNOSIS — L60.0 INGROWING TOENAIL: ICD-10-CM

## 2024-08-20 DIAGNOSIS — M79.675 PAIN IN TOES OF BOTH FEET: ICD-10-CM

## 2024-08-20 DIAGNOSIS — L84 HELOMA MOLLE: ICD-10-CM

## 2024-08-20 DIAGNOSIS — M79.674 PAIN IN TOES OF BOTH FEET: ICD-10-CM

## 2024-08-20 DIAGNOSIS — M20.42 HAMMER TOES OF BOTH FEET: ICD-10-CM

## 2024-08-20 PROCEDURE — 99213 OFFICE O/P EST LOW 20 MIN: CPT | Performed by: PODIATRIST

## 2024-08-23 ENCOUNTER — TELEPHONE (OUTPATIENT)
Dept: PODIATRY CLINIC | Facility: CLINIC | Age: 89
End: 2024-08-23

## 2024-08-23 DIAGNOSIS — M20.41 HAMMER TOES OF BOTH FEET: Primary | ICD-10-CM

## 2024-08-23 DIAGNOSIS — L84 HELOMA DURUM: ICD-10-CM

## 2024-08-23 DIAGNOSIS — M79.675 PAIN IN TOES OF BOTH FEET: ICD-10-CM

## 2024-08-23 DIAGNOSIS — M20.42 HAMMER TOES OF BOTH FEET: Primary | ICD-10-CM

## 2024-08-23 DIAGNOSIS — M79.674 PAIN IN TOES OF BOTH FEET: ICD-10-CM

## 2024-08-23 NOTE — TELEPHONE ENCOUNTER
Procedure: Arthroplasty fourth toe right foot  CPT code: 46054  Length of Surgery: 45 minutes  Any Instruments: Mini power, mini fluoroscopy  Call patient: within 24 hours  Anesthesia: MAC  Location: [facility]  Assistance: none  Pacemaker: No  Anticoagulants: No  Nickel Allergy: No  Latex Allergy: No  Diagnosis/ICD Code:     ICD-10-CM    1. Hammer toes of both feet  M20.41     M20.42       2. Heloma durum  L84       3. Pain in toes of both feet  M79.674     M79.675

## 2024-08-23 NOTE — PROGRESS NOTES
Guerita Birmingham is a 89 year old female.   Chief Complaint   Patient presents with    Toenail Care     Nail care and foot check         HPI:   Patient returns to the clinic for routine foot care.  She can no longer provide self-care.  She is complaining of very painful fourth and fifth toes.  At today's visit reviewed nurse's history as taken above, allergies medications and medical history as documented below.  All changes duly noted  Allergies: Celecoxib   Current Outpatient Medications   Medication Sig Dispense Refill    rosuvastatin 10 MG Oral Tab Take 1 tablet (10 mg total) by mouth daily.      Omeprazole 40 MG Oral Capsule Delayed Release Take 1 capsule (40 mg total) by mouth before breakfast.      HYDROcodone-acetaminophen (NORCO) 5-325 MG Oral Tab Take 1-2 tablets by mouth every 6 (six) hours as needed for Pain. 30 tablet 0    Flecainide Acetate 100 MG Oral Tab Take 1 tablet (100 mg total) by mouth every 12 (twelve) hours. 60 tablet 5    Warfarin Sodium 5 MG Oral Tab Take 1 tablet (5 mg total) by mouth daily. 6 mg five day per week  2.5 mg 1 x a week      Losartan Potassium 25 MG Oral Tab Take 1 tablet (25 mg total) by mouth daily.      Acetaminophen (TYLENOL ARTHRITIS EXT RELIEF OR) Take 2 tablets by mouth 2 (two) times daily.      Vitamin B-12 500 MCG Oral Tab Take 0.5 tablets (250 mcg total) by mouth twice a week.      ferrous sulfate 325 (65 FE) MG Oral Tab EC Take 1 tablet (325 mg total) by mouth daily with breakfast. 3 times a week      Potassium Chloride Barbra CR 20 MEQ Oral Tab CR Take 1 tablet (20 mEq total) by mouth daily.      Calcium Carbonate-Vitamin D (CALCIUM 600 + D OR) Take 1 tablet by mouth daily.      Torsemide (DEMADEX) 20 MG Oral Tab Take 1 tablet (20 mg total) by mouth every other day. I tab on odd and 2 on even days      Metoprolol Succinate (TOPROL XL) 25 MG Oral Tablet SR 24 Hr Take 1 tablet (25 mg total) by mouth daily. Half tablet until 6/13 then plans to discontinue         Past Medical History:    Anal spasm    Arrhythmia    atrial fibrillation    BLOOD CLOTS    Carotid atherosclerosis    Chronic anemia    Dermatophytosis of nail    Dizziness    End stage renal disease (HCC)    Esophageal reflux    Hearing impairment    HIGH BLOOD PRESSURE    HIGH CHOLESTEROL    Muscle weakness    Osteoarthrosis, unspecified whether generalized or localized, unspecified site    Other screening mammogram    Postmenopausal bleeding    Special screening for osteoporosis    Uterine fibroid    Venous insufficiency    Visual impairment    glasses      Past Surgical History:   Procedure Laterality Date    Cardioversion  11/2017    Colonoscopy      Hip replacement surgery      Lig div&stripping short saphenous vein  1979    Ligation of hemorrhoid(s)      Mastoid obliteration      Other surgical history      Mastoidectomy    Other surgical history      Venous Ligation with Stripping    Other surgical history      Venous Sclerosing By Injection    Tonsillectomy      Total hip replacement Bilateral     12/2013 and 2018    Vein clinic - [facility]        Family History   Problem Relation Age of Onset    Cancer Mother         ovarian    Other (congestive heart failure) Father     Cancer Maternal Grandmother         colon    Cancer Maternal Grandfather         colon    Breast Cancer Maternal Aunt 65    Breast Cancer Paternal Aunt 65    Cancer Maternal Uncle         prostate    Cancer Maternal Uncle         eye    Cancer Maternal Uncle         lung      Social History     Socioeconomic History    Marital status:    Tobacco Use    Smoking status: Never    Smokeless tobacco: Never   Vaping Use    Vaping status: Never Used   Substance and Sexual Activity    Alcohol use: No    Drug use: No   Other Topics Concern    Caffeine Concern Yes     Comment: OCC    Exercise Yes     Comment: walks occ, PT exercises at  home           REVIEW OF SYSTEMS:   Today reviewed systens as documented below  GENERAL HEALTH: feels well  otherwise  SKIN: Refer to exam below  RESPIRATORY: denies shortness of breath with exertion  CARDIOVASCULAR: denies chest pain on exertion  GI: denies abdominal pain and denies heartburn  NEURO: denies headaches    EXAM:   There were no vitals taken for this visit.  GENERAL: well developed, well nourished, in no apparent distress  EXTREMITIES:   1. Integument: The skin on her feet was evaluated its warm and dry there are multiple telangiectasias on both feet due to venous stasis and varicosities.  They are painful nucleated keratomas at the tip of the third toe left and tip of the fourth toe right secondary to adjacent hammertoe deformities.  There are no sores ulcerations or other problems noted at this visit.  The fourth toe she has developed a new corn over the DIP joint area secondary to a rigid hammertoe on the right foot.  After trimming of the corn it looks like it is preulcerative but there is no erythema no edema no sign of infection noted.   2. Vascular: Patient has weakly palpable pulses dorsalis pedis and posterior tibial bilateral   3. Neurologic: The patient has intact sensorium   4. Musculoskeletal: The patient has hammertoes digits 2-5 bilateral feet was distal tip keratomas 3 left and 4 right.  They do not reduce on forefoot loading.  Today I could not elicit any neuroma-like symptoms in the third interspace of her right foot therefore I did not recommend a cortisone injection.    ASSESSMENT AND PLAN:   Diagnoses and all orders for this visit:    Stage 3 chronic kidney disease, unspecified whether stage 3a or 3b CKD (HCC)    Hammer toes of both feet    Heloma durum    Pain in toes of both feet    Ingrowing toenail    Onychomycosis    Heloma molle        Plan: Today's visit trimmed toenails 1-5 manually can be started on healthy tissue as possible using a 15 blade I trimmed and debrided the hyperkeratoses at the distal tip of the toes that were affected the third on the left and the fourth on the  right alleviating the patient's symptoms.  Again discussed with her proper fitting shoe gear and advised her to follow-up every 2 to 3 months for care but sooner if required.  After trimming down all hyperkeratoses gave her silicone pads to wear on the fourth toe we will see how they work for her.  Advised follow-up again in 2 to 3 months for care but sooner if required.  Dispensed pads for proper offloading.  The patient indicates understanding of these issues and agrees to the plan.  The patient is complaining of severe pain in her fourth toe which she says throws her walking off.  She is interested in correction.At today's visit both conservative and surgical management was discussed for the diagnoses listed above.  After a lengthy conversation the patient opted for surgery after questions were answered.  The nature and extent of the surgery which would be, arthroplasty of the fourth toe, right foot, was explained in great detail.  The pre-, leslie-and postoperative management was discussed, along with changes in bathing habits as well.  The necessity for restricted activity possible nonweightbearing was also reviewed.  The possible complications and risks associated with the surgery including but not limited to recurrence of the deformity, nonresolution of the problem, infection as well as hospitalization and intravenous antibiotics if that occurs, the necessity for further surgery and/or hospitalization should complications occur or if an undesired result was obtained, and permanent numbness around the surgical site, guarantees or assurances were not offered.  Questions were invited and answered to the best of my ability.  At this time the patient wished to proceed with the surgical procedure and we will try to get that scheduled to the patient's convenience.   Girma Dc DPM

## 2024-08-26 DIAGNOSIS — M79.674 PAIN IN TOES OF BOTH FEET: ICD-10-CM

## 2024-08-26 DIAGNOSIS — M20.41 HAMMER TOES OF BOTH FEET: Primary | ICD-10-CM

## 2024-08-26 DIAGNOSIS — L84 HELOMA DURUM: ICD-10-CM

## 2024-08-26 DIAGNOSIS — M79.675 PAIN IN TOES OF BOTH FEET: ICD-10-CM

## 2024-08-26 DIAGNOSIS — M20.42 HAMMER TOES OF BOTH FEET: Primary | ICD-10-CM

## 2024-08-26 NOTE — TELEPHONE ENCOUNTER
S/W patient and scheduled surgery for 9/11 @ [facility] (per patient's request). Patient was informed about PAT requirements and was informed I would be in contact with PCP to pass along PAT requirements needed prior to surgery date. Patient has expressed verbal understanding at this time and had no questions or concerns at this time. Managed care orders have been placed, surgery is on the calendar, and post op appointments have been made.

## 2024-09-04 ENCOUNTER — TELEPHONE (OUTPATIENT)
Dept: PODIATRY CLINIC | Facility: CLINIC | Age: 89
End: 2024-09-04

## 2024-09-04 NOTE — TELEPHONE ENCOUNTER
Per patient calling to reschedule her surgery, asking if Dr. Dc does surgeries on Friday. Please advise

## 2024-09-05 DIAGNOSIS — M79.674 PAIN IN TOES OF BOTH FEET: ICD-10-CM

## 2024-09-05 DIAGNOSIS — M79.675 PAIN IN TOES OF BOTH FEET: ICD-10-CM

## 2024-09-05 DIAGNOSIS — L84 HELOMA DURUM: ICD-10-CM

## 2024-09-05 DIAGNOSIS — M20.41 HAMMER TOES OF BOTH FEET: Primary | ICD-10-CM

## 2024-09-05 DIAGNOSIS — M20.42 HAMMER TOES OF BOTH FEET: Primary | ICD-10-CM

## 2024-09-09 DIAGNOSIS — M79.675 PAIN IN TOES OF BOTH FEET: ICD-10-CM

## 2024-09-09 DIAGNOSIS — L84 HELOMA DURUM: ICD-10-CM

## 2024-09-09 DIAGNOSIS — M20.41 HAMMER TOES OF BOTH FEET: Primary | ICD-10-CM

## 2024-09-09 DIAGNOSIS — M79.674 PAIN IN TOES OF BOTH FEET: ICD-10-CM

## 2024-09-09 DIAGNOSIS — M20.42 HAMMER TOES OF BOTH FEET: Primary | ICD-10-CM

## 2024-09-12 ENCOUNTER — HOSPITAL ENCOUNTER (OUTPATIENT)
Dept: LAB | Facility: HOSPITAL | Age: 89
Discharge: HOME | End: 2024-09-12
Attending: INTERNAL MEDICINE
Payer: MEDICARE

## 2024-09-12 DIAGNOSIS — I48.91 ATRIAL FIBRILLATION (HCC): ICD-10-CM

## 2024-09-12 LAB — INR BLDC: 2.2 (ref 0.9–1.1)

## 2024-09-12 PROCEDURE — 85610 PROTHROMBIN TIME: CPT

## 2024-10-10 ENCOUNTER — HOSPITAL ENCOUNTER (OUTPATIENT)
Dept: LAB | Facility: HOSPITAL | Age: 89
Discharge: HOME | End: 2024-10-10
Attending: INTERNAL MEDICINE
Payer: MEDICARE

## 2024-10-10 DIAGNOSIS — I48.91 ATRIAL FIBRILLATION (HCC): ICD-10-CM

## 2024-10-10 LAB — INR BLDC: 2.3 (ref 0.9–1.1)

## 2024-10-10 PROCEDURE — 85610 PROTHROMBIN TIME: CPT

## 2024-10-29 ENCOUNTER — OFFICE VISIT (OUTPATIENT)
Dept: PODIATRY CLINIC | Facility: CLINIC | Age: 89
End: 2024-10-29

## 2024-10-29 DIAGNOSIS — M20.42 HAMMER TOES OF BOTH FEET: Primary | ICD-10-CM

## 2024-10-29 DIAGNOSIS — N18.30 STAGE 3 CHRONIC KIDNEY DISEASE, UNSPECIFIED WHETHER STAGE 3A OR 3B CKD (HCC): ICD-10-CM

## 2024-10-29 DIAGNOSIS — M79.674 PAIN IN TOES OF BOTH FEET: ICD-10-CM

## 2024-10-29 DIAGNOSIS — L84 HELOMA DURUM: ICD-10-CM

## 2024-10-29 DIAGNOSIS — L84 HELOMA MOLLE: ICD-10-CM

## 2024-10-29 DIAGNOSIS — M79.675 PAIN IN TOES OF BOTH FEET: ICD-10-CM

## 2024-10-29 DIAGNOSIS — M20.41 HAMMER TOES OF BOTH FEET: Primary | ICD-10-CM

## 2024-10-29 DIAGNOSIS — B35.1 ONYCHOMYCOSIS: ICD-10-CM

## 2024-10-29 DIAGNOSIS — L60.0 INGROWING TOENAIL: ICD-10-CM

## 2024-10-29 PROCEDURE — 99213 OFFICE O/P EST LOW 20 MIN: CPT | Performed by: PODIATRIST

## 2024-10-29 NOTE — PROGRESS NOTES
Guerita Birmingham is a 89 year old female.   Chief Complaint   Patient presents with    Toenail Care     Pt here for nail trim and foot check .           HPI:   The patient returns to clinic for routine care again complaining of a painful corn predominantly on the fourth toe of her right foot she had canceled her surgery because her granddaughter developed breast cancer and was receiving chemotherapy she was assisting her while her daughter was helping the grandchildren.  At today's visit reviewed nurse's history as taken above, allergies medications and medical history as documented below.  All changes duly noted  Allergies: Celecoxib   Current Outpatient Medications   Medication Sig Dispense Refill    Flecainide Acetate 100 MG Oral Tab Take 1 tablet (100 mg total) by mouth every 12 (twelve) hours. 60 tablet 5    Warfarin Sodium 5 MG Oral Tab Take 1 tablet (5 mg total) by mouth daily. 5 mg six day per week  7.5 mg 1 x a week  on thursday      Losartan Potassium 25 MG Oral Tab Take 1 tablet (25 mg total) by mouth daily.      Acetaminophen (TYLENOL ARTHRITIS EXT RELIEF OR) Take 2 tablets by mouth as needed.      Vitamin B-12 500 MCG Oral Tab Take 0.5 tablets (250 mcg total) by mouth. 3X week      ferrous sulfate 325 (65 FE) MG Oral Tab EC Take 1 tablet (325 mg total) by mouth. 3 times a week      Potassium Chloride Barbra CR 20 MEQ Oral Tab CR Take 1 tablet (20 mEq total) by mouth daily.      Calcium Carbonate-Vitamin D (CALCIUM 600 + D OR) Take 1 tablet by mouth. 3X week      Torsemide (DEMADEX) 20 MG Oral Tab Take 1 tablet (20 mg total) by mouth Every other day PRN. I tab on odd and 2 on even days        Past Medical History:    Anal spasm    Arrhythmia    atrial fibrillation    BLOOD CLOTS    Carotid atherosclerosis    Chronic anemia    Dermatophytosis of nail    Dizziness    End stage renal disease (HCC)    Esophageal reflux    Hearing impairment    HIGH BLOOD PRESSURE    HIGH CHOLESTEROL    Muscle weakness     Osteoarthrosis, unspecified whether generalized or localized, unspecified site    Other screening mammogram    Postmenopausal bleeding    Special screening for osteoporosis    Uterine fibroid    Venous insufficiency    Visual impairment    glasses      Past Surgical History:   Procedure Laterality Date    Cardioversion  11/2017    Colonoscopy      Hip replacement surgery      Lig div&stripping short saphenous vein  1979    Ligation of hemorrhoid(s)      Mastoid obliteration      Other surgical history      Mastoidectomy    Other surgical history      Venous Ligation with Stripping    Other surgical history      Venous Sclerosing By Injection    Tonsillectomy      Total hip replacement Bilateral     12/2013 and 2018    Vein clinic - [facility]        Family History   Problem Relation Age of Onset    Cancer Mother         ovarian    Other (congestive heart failure) Father     Cancer Maternal Grandmother         colon    Cancer Maternal Grandfather         colon    Breast Cancer Maternal Aunt 65    Breast Cancer Paternal Aunt 65    Cancer Maternal Uncle         prostate    Cancer Maternal Uncle         eye    Cancer Maternal Uncle         lung      Social History     Socioeconomic History    Marital status:    Tobacco Use    Smoking status: Never    Smokeless tobacco: Never   Vaping Use    Vaping status: Never Used   Substance and Sexual Activity    Alcohol use: No    Drug use: No   Other Topics Concern    Caffeine Concern Yes     Comment: OCC    Exercise Yes     Comment: walks occ, PT exercises at  home           REVIEW OF SYSTEMS:   Today reviewed systens as documented below  GENERAL HEALTH: feels well otherwise  SKIN: Refer to exam below  RESPIRATORY: denies shortness of breath with exertion  CARDIOVASCULAR: denies chest pain on exertion  GI: denies abdominal pain and denies heartburn  NEURO: denies headaches    EXAM:   There were no vitals taken for this visit.  GENERAL: well developed, well nourished, in no  apparent distress  EXTREMITIES:   1. Integument: The skin on her feet was evaluated its warm and dry there are multiple telangiectasias on both feet due to venous stasis and varicosities.  They are painful nucleated keratomas at the tip of the third toe left and tip of the fourth toe right secondary to adjacent hammertoe deformities.  There are no sores ulcerations or other problems noted at this visit.  The fourth toe she has developed a new corn over the DIP joint area secondary to a rigid hammertoe on the right foot.  She has a similar lesion on the third toe of the left foot but it is not symptomatic or painful.   2. Vascular: Patient has weakly palpable pulses dorsalis pedis and posterior tibial bilateral   3. Neurologic: The patient has intact sensorium   4. Musculoskeletal: The patient has hammertoes digits 2-5 bilateral feet was distal tip keratomas 3 left and 4 right.  They do not reduce on forefoot loading.  Today I could not elicit any neuroma-like symptoms in the third interspace of her right foot therefore I did not recommend a cortisone injection.    ASSESSMENT AND PLAN:   Diagnoses and all orders for this visit:    Hammer toes of both feet    Heloma durum    Pain in toes of both feet    Ingrowing toenail    Stage 3 chronic kidney disease, unspecified whether stage 3a or 3b CKD (HCC)    Onychomycosis    Heloma molle        Plan: Today's visit trimmed toenails 1-5 manually can be started on healthy tissue as possible using a 15 blade I trimmed and debrided the hyperkeratoses at the distal tip of the toes that were affected the third on the left and the fourth on the right alleviating the patient's symptoms.  Again discussed with her proper fitting shoe gear and advised her to follow-up every 2 to 3 months for care but sooner if required.  After trimming down all hyperkeratoses gave her silicone pads to wear on the fourth toe we will see how they work for her.  Advised follow-up again in 2 to 3 months  for care but sooner if required.  Dispensed pads for proper offloading.  The patient indicates understanding of these issues and agrees to the plan.    Girma Dc DPM

## 2024-11-07 ENCOUNTER — HOSPITAL ENCOUNTER (OUTPATIENT)
Dept: LAB | Facility: HOSPITAL | Age: 89
Discharge: HOME | End: 2024-11-07
Attending: INTERNAL MEDICINE
Payer: MEDICARE

## 2024-11-07 DIAGNOSIS — I48.91 ATRIAL FIBRILLATION (HCC): ICD-10-CM

## 2024-11-07 LAB — INR BLDC: 2.2 (ref 0.9–1.1)

## 2024-11-07 PROCEDURE — 85610 PROTHROMBIN TIME: CPT

## 2024-12-05 ENCOUNTER — HOSPITAL ENCOUNTER (OUTPATIENT)
Dept: LAB | Facility: HOSPITAL | Age: 89
Discharge: HOME | End: 2024-12-05
Attending: INTERNAL MEDICINE
Payer: MEDICARE

## 2024-12-05 DIAGNOSIS — I48.91 ATRIAL FIBRILLATION (HCC): ICD-10-CM

## 2024-12-05 LAB — INR BLDC: 2.7 (ref 0.9–1.1)

## 2024-12-05 PROCEDURE — 85610 PROTHROMBIN TIME: CPT

## 2025-01-02 ENCOUNTER — HOSPITAL ENCOUNTER (OUTPATIENT)
Dept: LAB | Facility: HOSPITAL | Age: OVER 89
Discharge: HOME | End: 2025-01-02
Attending: INTERNAL MEDICINE
Payer: MEDICARE

## 2025-01-02 ENCOUNTER — OFFICE VISIT (OUTPATIENT)
Dept: PODIATRY CLINIC | Facility: CLINIC | Age: OVER 89
End: 2025-01-02

## 2025-01-02 DIAGNOSIS — L84 HELOMA DURUM: ICD-10-CM

## 2025-01-02 DIAGNOSIS — M20.42 HAMMER TOES OF BOTH FEET: ICD-10-CM

## 2025-01-02 DIAGNOSIS — M79.675 PAIN IN TOES OF BOTH FEET: ICD-10-CM

## 2025-01-02 DIAGNOSIS — I48.91 ATRIAL FIBRILLATION (HCC): ICD-10-CM

## 2025-01-02 DIAGNOSIS — M20.41 HAMMER TOES OF BOTH FEET: ICD-10-CM

## 2025-01-02 DIAGNOSIS — B35.1 ONYCHOMYCOSIS: ICD-10-CM

## 2025-01-02 DIAGNOSIS — N18.30 STAGE 3 CHRONIC KIDNEY DISEASE, UNSPECIFIED WHETHER STAGE 3A OR 3B CKD (HCC): Primary | ICD-10-CM

## 2025-01-02 DIAGNOSIS — M79.674 PAIN IN TOES OF BOTH FEET: ICD-10-CM

## 2025-01-02 DIAGNOSIS — L60.0 INGROWING TOENAIL: ICD-10-CM

## 2025-01-02 LAB — INR BLDC: 2.5 (ref 0.9–1.1)

## 2025-01-02 PROCEDURE — 99213 OFFICE O/P EST LOW 20 MIN: CPT | Performed by: PODIATRIST

## 2025-01-02 PROCEDURE — 85610 PROTHROMBIN TIME: CPT

## 2025-01-03 NOTE — PROGRESS NOTES
Guerita Birmingham is a 89 year old female.   Chief Complaint   Patient presents with    Toenail Care     Nail care and foot check         HPI:   Returns to the clinic again complaining of painful corns mostly on the third toes of both feet.  These bother her in enclosed shoe gear prevent unrestricted ambulation her nails are thick and long and she cannot provide self-care.  At today's visit reviewed nurse's history as taken above, allergies medications and medical history as documented below.  All changes duly noted  Allergies: Celecoxib   Current Outpatient Medications   Medication Sig Dispense Refill    Flecainide Acetate 100 MG Oral Tab Take 1 tablet (100 mg total) by mouth every 12 (twelve) hours. 60 tablet 5    Warfarin Sodium 5 MG Oral Tab Take 1 tablet (5 mg total) by mouth daily. 5 mg six day per week  7.5 mg 1 x a week  on thursday      Losartan Potassium 25 MG Oral Tab Take 1 tablet (25 mg total) by mouth daily.      Acetaminophen (TYLENOL ARTHRITIS EXT RELIEF OR) Take 2 tablets by mouth as needed.      Vitamin B-12 500 MCG Oral Tab Take 0.5 tablets (250 mcg total) by mouth. 3X week      ferrous sulfate 325 (65 FE) MG Oral Tab EC Take 1 tablet (325 mg total) by mouth. 3 times a week      Potassium Chloride Barbra CR 20 MEQ Oral Tab CR Take 1 tablet (20 mEq total) by mouth daily.      Calcium Carbonate-Vitamin D (CALCIUM 600 + D OR) Take 1 tablet by mouth. 3X week      Torsemide (DEMADEX) 20 MG Oral Tab Take 1 tablet (20 mg total) by mouth Every other day PRN. I tab on odd and 2 on even days        Past Medical History:    Anal spasm    Arrhythmia    atrial fibrillation    BLOOD CLOTS    Carotid atherosclerosis    Chronic anemia    Dermatophytosis of nail    Dizziness    End stage renal disease (HCC)    Esophageal reflux    Hearing impairment    HIGH BLOOD PRESSURE    HIGH CHOLESTEROL    Muscle weakness    Osteoarthrosis, unspecified whether generalized or localized, unspecified site    Other screening  mammogram    Postmenopausal bleeding    Special screening for osteoporosis    Uterine fibroid    Venous insufficiency    Visual impairment    glasses      Past Surgical History:   Procedure Laterality Date    Cardioversion  11/2017    Colonoscopy      Hip replacement surgery      Lig div&stripping short saphenous vein  1979    Ligation of hemorrhoid(s)      Mastoid obliteration      Other surgical history      Mastoidectomy    Other surgical history      Venous Ligation with Stripping    Other surgical history      Venous Sclerosing By Injection    Tonsillectomy      Total hip replacement Bilateral     12/2013 and 2018    Vein clinic - [facility]        Family History   Problem Relation Age of Onset    Cancer Mother         ovarian    Other (congestive heart failure) Father     Cancer Maternal Grandmother         colon    Cancer Maternal Grandfather         colon    Breast Cancer Maternal Aunt 65    Breast Cancer Paternal Aunt 65    Cancer Maternal Uncle         prostate    Cancer Maternal Uncle         eye    Cancer Maternal Uncle         lung      Social History     Socioeconomic History    Marital status:    Tobacco Use    Smoking status: Never    Smokeless tobacco: Never   Vaping Use    Vaping status: Never Used   Substance and Sexual Activity    Alcohol use: No    Drug use: No   Other Topics Concern    Caffeine Concern Yes     Comment: OCC    Exercise Yes     Comment: walks occ, PT exercises at  home           REVIEW OF SYSTEMS:   Today reviewed systens as documented below  GENERAL HEALTH: feels well otherwise  SKIN: Refer to exam below  RESPIRATORY: denies shortness of breath with exertion  CARDIOVASCULAR: denies chest pain on exertion  GI: denies abdominal pain and denies heartburn  NEURO: denies headaches    EXAM:   There were no vitals taken for this visit.  GENERAL: well developed, well nourished, in no apparent distress  EXTREMITIES:   1. Integument: The skin on her feet was evaluated its warm and dry  there are multiple telangiectasias on both feet due to venous stasis and varicosities.  They are painful nucleated keratomas at the tip of the third toe left and tip of the fourth toe right secondary to adjacent hammertoe deformities.  There are no sores ulcerations or other problems noted at this visit.  The fourth toe she has developed a new corn over the DIP joint area secondary to a rigid hammertoe on the right foot.  She has a similar lesion on the third toe of the left foot but it is not symptomatic or painful.   2. Vascular: Patient has weakly palpable pulses dorsalis pedis and posterior tibial bilateral   3. Neurologic: The patient has intact sensorium   4. Musculoskeletal: The patient has hammertoes digits 2-5 bilateral feet was distal tip keratomas 3 left and 4 right.  They do not reduce on forefoot loading.    ASSESSMENT AND PLAN:   Diagnoses and all orders for this visit:    Stage 3 chronic kidney disease, unspecified whether stage 3a or 3b CKD (HCC)    Hammer toes of both feet    Heloma durum    Pain in toes of both feet    Ingrowing toenail    Onychomycosis        Plan: Today's visit trimmed toenails 1-5 manually can be started on healthy tissue as possible using a 15 blade I trimmed and debrided the hyperkeratoses at the distal tip of the toes that were affected the third on the left and the fourth on the right alleviating the patient's symptoms.  Again discussed with her proper fitting shoe gear and advised her to follow-up every 2 to 3 months for care but sooner if required.  After trimming down all hyperkeratoses gave her silicone pads to wear on the fourth toe we will see how they work for her.  Advised follow-up again in 2 to 3 months for care but sooner if required..  The patient does not want him to use the crest pads she can continue to use her cotton padding as tolerated.  She said it works better for her.  The patient indicates understanding of these issues and agrees to the plan.    Girma  ANGELICA Dc DPM

## 2025-01-06 ENCOUNTER — HOSPITAL ENCOUNTER (OUTPATIENT)
Dept: MAMMOGRAPHY | Age: OVER 89
Discharge: HOME | End: 2025-01-06
Attending: FAMILY MEDICINE
Payer: MEDICARE

## 2025-01-06 DIAGNOSIS — Z12.31 ENCOUNTER FOR SCREENING MAMMOGRAM FOR MALIGNANT NEOPLASM OF BREAST: ICD-10-CM

## 2025-01-06 PROCEDURE — 77067 SCR MAMMO BI INCL CAD: CPT | Performed by: FAMILY MEDICINE

## 2025-01-06 PROCEDURE — 77063 BREAST TOMOSYNTHESIS BI: CPT | Performed by: FAMILY MEDICINE

## 2025-04-10 ENCOUNTER — HOSPITAL ENCOUNTER (OUTPATIENT)
Dept: LAB | Facility: HOSPITAL | Age: OVER 89
Discharge: HOME | End: 2025-04-10
Attending: INTERNAL MEDICINE
Payer: MEDICARE

## 2025-04-10 DIAGNOSIS — I48.91 ATRIAL FIBRILLATION (HCC): ICD-10-CM

## 2025-04-10 LAB — INR BLDC: 2.7 (ref 0.9–1.1)

## 2025-04-10 PROCEDURE — 85610 PROTHROMBIN TIME: CPT

## 2025-04-17 ENCOUNTER — OFFICE VISIT (OUTPATIENT)
Dept: PODIATRY CLINIC | Facility: CLINIC | Age: OVER 89
End: 2025-04-17

## 2025-04-17 DIAGNOSIS — M20.41 HAMMER TOES OF BOTH FEET: ICD-10-CM

## 2025-04-17 DIAGNOSIS — M20.42 HAMMER TOES OF BOTH FEET: ICD-10-CM

## 2025-04-17 DIAGNOSIS — N18.30 STAGE 3 CHRONIC KIDNEY DISEASE, UNSPECIFIED WHETHER STAGE 3A OR 3B CKD (HCC): Primary | ICD-10-CM

## 2025-04-17 DIAGNOSIS — L84 HELOMA DURUM: ICD-10-CM

## 2025-04-17 DIAGNOSIS — M79.674 PAIN IN TOES OF BOTH FEET: ICD-10-CM

## 2025-04-17 DIAGNOSIS — B35.1 ONYCHOMYCOSIS: ICD-10-CM

## 2025-04-17 DIAGNOSIS — M79.675 PAIN IN TOES OF BOTH FEET: ICD-10-CM

## 2025-04-17 DIAGNOSIS — L84 HELOMA MOLLE: ICD-10-CM

## 2025-04-17 DIAGNOSIS — L60.0 INGROWING TOENAIL: ICD-10-CM

## 2025-04-17 PROCEDURE — 99213 OFFICE O/P EST LOW 20 MIN: CPT | Performed by: PODIATRIST

## 2025-04-17 NOTE — PROGRESS NOTES
Guerita Birmingham is a 89 year old female.   Chief Complaint   Patient presents with    Toenail Care     Nail care and foot check- pcp lov 12/08/23          HPI:   Patient just got back from being in [place] for 3 months.  She presents complaining of painful corns on the tips of a couple of her toes the fourth on the right and the third on the left.  Also her toenails are thickened she cannot provide self-care.  At today's visit reviewed nurse's history as taken above, allergies medications and medical history as documented below.  All changes duly noted  Allergies: Celecoxib   Current Outpatient Medications   Medication Sig Dispense Refill    Flecainide Acetate 100 MG Oral Tab Take 1 tablet (100 mg total) by mouth every 12 (twelve) hours. 60 tablet 5    Warfarin Sodium 5 MG Oral Tab Take 1 tablet (5 mg total) by mouth daily. 5 mg six day per week  7.5 mg 1 x a week  on thursday      Losartan Potassium 25 MG Oral Tab Take 1 tablet (25 mg total) by mouth daily.      Acetaminophen (TYLENOL ARTHRITIS EXT RELIEF OR) Take 2 tablets by mouth as needed.      Vitamin B-12 500 MCG Oral Tab Take 0.5 tablets (250 mcg total) by mouth. 3X week      ferrous sulfate 325 (65 FE) MG Oral Tab EC Take 1 tablet (325 mg total) by mouth. 3 times a week      Potassium Chloride Barbra CR 20 MEQ Oral Tab CR Take 1 tablet (20 mEq total) by mouth daily.      Calcium Carbonate-Vitamin D (CALCIUM 600 + D OR) Take 1 tablet by mouth. 3X week      Torsemide (DEMADEX) 20 MG Oral Tab Take 1 tablet (20 mg total) by mouth Every other day PRN. I tab on odd and 2 on even days        Past Medical History:    Anal spasm    Arrhythmia    atrial fibrillation    BLOOD CLOTS    Carotid atherosclerosis    Chronic anemia    Dermatophytosis of nail    Dizziness    End stage renal disease (HCC)    Esophageal reflux    Hearing impairment    HIGH BLOOD PRESSURE    HIGH CHOLESTEROL    Muscle weakness    Osteoarthrosis, unspecified whether generalized or localized,  unspecified site    Other screening mammogram    Postmenopausal bleeding    Special screening for osteoporosis    Uterine fibroid    Venous insufficiency    Visual impairment    glasses      Past Surgical History:   Procedure Laterality Date    Cardioversion  11/2017    Colonoscopy      Hip replacement surgery      Lig div&stripping short saphenous vein  1979    Ligation of hemorrhoid(s)      Mastoid obliteration      Other surgical history      Mastoidectomy    Other surgical history      Venous Ligation with Stripping    Other surgical history      Venous Sclerosing By Injection    Tonsillectomy      Total hip replacement Bilateral     12/2013 and 2018    Vein clinic - d[facility]        Family History   Problem Relation Age of Onset    Cancer Mother         ovarian    Other (congestive heart failure) Father     Cancer Maternal Grandmother         colon    Cancer Maternal Grandfather         colon    Breast Cancer Maternal Aunt 65    Breast Cancer Paternal Aunt 65    Cancer Maternal Uncle         prostate    Cancer Maternal Uncle         eye    Cancer Maternal Uncle         lung      Social History     Socioeconomic History    Marital status:    Tobacco Use    Smoking status: Never    Smokeless tobacco: Never   Vaping Use    Vaping status: Never Used   Substance and Sexual Activity    Alcohol use: No    Drug use: No   Other Topics Concern    Caffeine Concern Yes     Comment: OCC    Exercise Yes     Comment: walks occ, PT exercises at  home           REVIEW OF SYSTEMS:   Today reviewed systens as documented below  GENERAL HEALTH: feels well otherwise  SKIN: Refer to exam below  RESPIRATORY: denies shortness of breath with exertion  CARDIOVASCULAR: denies chest pain on exertion  GI: denies abdominal pain and denies heartburn  NEURO: denies headaches    EXAM:   There were no vitals taken for this visit.  GENERAL: well developed, well nourished, in no apparent distress  EXTREMITIES:   1. Integument: The skin on her  feet was evaluated its warm and dry there are multiple telangiectasias on both feet due to venous stasis and varicosities.  They are painful nucleated keratomas at the tip of the third toe left and tip of the fourth toe right secondary to adjacent hammertoe deformities.  There are no sores ulcerations or other problems noted at this visit.  The fourth toe she has developed a new corn over the DIP joint area secondary to a rigid hammertoe on the right foot.  She has a similar lesion on the third toe of the left foot but it is not symptomatic or painful.   2. Vascular: Patient has weakly palpable pulses dorsalis pedis and posterior tibial bilateral   3. Neurologic: The patient has intact sensorium   4. Musculoskeletal: The patient has hammertoes digits 2-5 bilateral feet was distal tip keratomas 3 left and 4 right.  They do not reduce on forefoot loading.    ASSESSMENT AND PLAN:   Diagnoses and all orders for this visit:    Stage 3 chronic kidney disease, unspecified whether stage 3a or 3b CKD (HCC)    Hammer toes of both feet    Heloma durum    Pain in toes of both feet    Ingrowing toenail    Onychomycosis    Heloma molle        Plan: Today's visit trimmed toenails 1-5 manually can be started on healthy tissue as possible using a 15 blade I trimmed and debrided the hyperkeratoses at the distal tip of the toes that were affected the third on the left and the fourth on the right alleviating the patient's symptoms.  Again discussed with her proper fitting shoe gear and advised her to follow-up every 2 to 3 months for care but sooner if required.  After trimming down all hyperkeratoses gave her silicone pads to wear on the fourth toe we will see how they work for her.  Advised follow-up again in 2 to 3 months for care but sooner if required..  The patient does not want him to use the crest pads she can continue to use her cotton padding as tolerated.  She said it works better for her.  The patient indicates  understanding of these issues and agrees to the plan.    Girma Dc, DAYANNAM

## 2025-05-08 ENCOUNTER — HOSPITAL ENCOUNTER (OUTPATIENT)
Dept: LAB | Facility: HOSPITAL | Age: OVER 89
Discharge: HOME | End: 2025-05-08
Attending: INTERNAL MEDICINE
Payer: MEDICARE

## 2025-05-08 DIAGNOSIS — I48.91 ATRIAL FIBRILLATION (HCC): ICD-10-CM

## 2025-05-08 LAB — INR BLDC: 3.1 (ref 0.9–1.1)

## 2025-05-08 PROCEDURE — 85610 PROTHROMBIN TIME: CPT

## 2025-05-22 ENCOUNTER — HOSPITAL ENCOUNTER (OUTPATIENT)
Dept: LAB | Facility: HOSPITAL | Age: OVER 89
Discharge: HOME | End: 2025-05-22
Attending: INTERNAL MEDICINE
Payer: MEDICARE

## 2025-05-22 DIAGNOSIS — I48.91 ATRIAL FIBRILLATION (HCC): ICD-10-CM

## 2025-05-22 LAB — INR BLDC: 1.9 (ref 0.9–1.1)

## 2025-05-22 PROCEDURE — 85610 PROTHROMBIN TIME: CPT

## 2025-05-29 ENCOUNTER — HOSPITAL ENCOUNTER (OUTPATIENT)
Dept: LAB | Facility: HOSPITAL | Age: OVER 89
Discharge: HOME | End: 2025-05-29
Attending: INTERNAL MEDICINE
Payer: MEDICARE

## 2025-05-29 DIAGNOSIS — I48.91 ATRIAL FIBRILLATION (HCC): ICD-10-CM

## 2025-05-29 LAB — INR BLDC: 2.2 (ref 0.9–1.1)

## 2025-05-29 PROCEDURE — 85610 PROTHROMBIN TIME: CPT

## 2025-06-12 ENCOUNTER — HOSPITAL ENCOUNTER (OUTPATIENT)
Dept: LAB | Facility: HOSPITAL | Age: OVER 89
Discharge: HOME | End: 2025-06-12
Attending: INTERNAL MEDICINE
Payer: MEDICARE

## 2025-06-12 DIAGNOSIS — I48.91 ATRIAL FIBRILLATION WITH RVR (HCC): ICD-10-CM

## 2025-06-12 LAB — INR BLDC: 2.6 (ref 0.9–1.1)

## 2025-06-12 PROCEDURE — 85610 PROTHROMBIN TIME: CPT | Performed by: INTERNAL MEDICINE

## 2025-07-03 ENCOUNTER — OFFICE VISIT (OUTPATIENT)
Dept: FAMILY MEDICINE CLINIC | Facility: CLINIC | Age: OVER 89
End: 2025-07-03
Payer: MEDICARE

## 2025-07-03 VITALS
OXYGEN SATURATION: 97 % | DIASTOLIC BLOOD PRESSURE: 78 MMHG | HEART RATE: 68 BPM | HEIGHT: 71 IN | TEMPERATURE: 97 F | RESPIRATION RATE: 16 BRPM | BODY MASS INDEX: 27.58 KG/M2 | WEIGHT: 197 LBS | SYSTOLIC BLOOD PRESSURE: 124 MMHG

## 2025-07-03 DIAGNOSIS — H69.92 DYSFUNCTION OF LEFT EUSTACHIAN TUBE: Primary | ICD-10-CM

## 2025-07-03 PROCEDURE — 99213 OFFICE O/P EST LOW 20 MIN: CPT | Performed by: NURSE PRACTITIONER

## 2025-07-03 RX ORDER — FLUTICASONE PROPIONATE 50 MCG
1 SPRAY, SUSPENSION (ML) NASAL DAILY
Qty: 1 EACH | Refills: 0 | Status: SHIPPED | OUTPATIENT
Start: 2025-07-03 | End: 2025-08-02

## 2025-07-03 RX ORDER — TRAMADOL HYDROCHLORIDE 50 MG/1
TABLET ORAL
COMMUNITY
Start: 2025-04-11

## 2025-07-03 RX ORDER — METHYLPREDNISOLONE 4 MG/1
TABLET ORAL
COMMUNITY
Start: 2025-04-11

## 2025-07-03 RX ORDER — ROSUVASTATIN CALCIUM 10 MG/1
TABLET, COATED ORAL
COMMUNITY

## 2025-07-03 NOTE — PROGRESS NOTES
CHIEF COMPLAINT:     Chief Complaint   Patient presents with    Ear Pain     L ear pain x 1 week       HPI:   Guerita Birmingham is a 89 year old female who presents to clinic today with complaints of intermittent left ear pain. Has had for 1  weeks. Pain is described as sharp pain on occasion  and is located at inner left ear.  Patient denies history of ear infections.  Nothing makes ear pain worse, but none is palliative.  Home treatment includes takes tylenol arthritis daily.  Pt denies decreased hearing. Pt denies hearing loss. Does wear hearing eids. Pt denies drainage. Patient denies use of Q-tips to clean the ears. Pt denies nasal congestion. Pt denies recent allergy symptoms. Pt denies fever.    Current Medications[1]   Past Medical History[2]   Social History:  Short Social Hx on File[3]     REVIEW OF SYSTEMS:   GENERAL: Feeling well otherwise.  No chills.  No unexpected weight change.  SKIN: no unusual skin lesions or rashes  HEENT: See HPI  LUNGS: No cough, shortness of breath, or wheezing.  CARDIOVASCULAR: No chest pain, palpitations  GI: No N/V/C/D.  NEURO: denies headaches or dizziness    EXAM:   /78   Pulse 68   Temp 97.3 °F (36.3 °C)   Resp 16   Ht 5' 11\" (1.803 m)   Wt 197 lb (89.4 kg)   SpO2 97%   BMI 27.48 kg/m²   GENERAL: well developed, well nourished,in no apparent distress  SKIN: no rashes,no suspicious lesions  HEAD: atraumatic, normocephalic  EYES: conjunctiva clear, EOM intact  EARS: Tragus non tender on palpation bilaterally. External auditory canals healthy. Right TM: pearly, no  bulging, no retraction,no  fluid, bony landmarks visualized.  Left TM: pearly, no  bulging, no retraction,mild serous fluid, bony landmarks visualized.  NOSE: nostrils patent, no exudates, nasal mucosa pink and noninflamed  THROAT: oral mucosa pink, moist. Posterior pharynx is not erythematous or injected. No exudates.  NECK: supple, non-tender  LUNGS: clear to auscultation bilaterally, no  wheezes or rhonchi. No crackles/rales, good air movement throughout. Breathing is non labored.  CARDIO: RRR without murmur  LYMPH: NO cervical/auricular lymphadenopathy.      ASSESSMENT AND PLAN:   Guerita Birmingham is a 89 year old female who presents with:    ASSESSMENT:  Encounter Diagnosis   Name Primary?    Dysfunction of left eustachian tube Yes       PLAN: Meds as listed below. comfort measures as described in Patient Instructions.      Meds & Refills for this Visit:  Requested Prescriptions     Signed Prescriptions Disp Refills    fluticasone propionate 50 MCG/ACT Nasal Suspension 1 each 0     Si spray by Nasal route daily.         Risk and benefits of medication discussed. Stressed importance of completing full course of antibiotic.     Tylenol/Motrin prn pain.      Call or return if s/sx worsen, do not improve in 3 days, or if fever of 100.4 or greater persists for 72 hours.    There are no Patient Instructions on file for this visit.      Patient voiced understand and is in agreement with treatment plan.        [1]   Current Outpatient Medications   Medication Sig Dispense Refill    methylPREDNISolone 4 MG Oral Tablet Therapy Pack use as directed per packet instructions for 6 days      fluticasone propionate 50 MCG/ACT Nasal Suspension 1 spray by Nasal route daily. 1 each 0    cholecalciferol 125 MCG (5000 UT) Oral Tab Vitamin D3 125 mcg (5,000 unit) tablet, [RxNorm: 233732]      rosuvastatin 10 MG Oral Tab TAKE ONE TABLET BY MOUTH ONE TIME DAILY Oral for Not Available      traMADol 50 MG Oral Tab TAKE ONE TABLET EVERY 8 HOURS AS NEEDED FOR BACK PAIN.      Flecainide Acetate 100 MG Oral Tab Take 1 tablet (100 mg total) by mouth every 12 (twelve) hours. 60 tablet 5    Warfarin Sodium 5 MG Oral Tab Take 1 tablet (5 mg total) by mouth daily. 5 mg six day per week  7.5 mg 1 x a week  on thursday      Losartan Potassium 25 MG Oral Tab Take 1 tablet (25 mg total) by mouth daily.      Acetaminophen  (TYLENOL ARTHRITIS EXT RELIEF OR) Take 2 tablets by mouth as needed.      Vitamin B-12 500 MCG Oral Tab Take 0.5 tablets (250 mcg total) by mouth. 3X week      ferrous sulfate 325 (65 FE) MG Oral Tab EC Take 1 tablet (325 mg total) by mouth. 3 times a week      Potassium Chloride Barbra CR 20 MEQ Oral Tab CR Take 1 tablet (20 mEq total) by mouth daily.      Calcium Carbonate-Vitamin D (CALCIUM 600 + D OR) Take 1 tablet by mouth. 3X week      Torsemide (DEMADEX) 20 MG Oral Tab Take 1 tablet (20 mg total) by mouth Every other day PRN. I tab on odd and 2 on even days     [2]   Past Medical History:   Anal spasm    Arrhythmia    atrial fibrillation    BLOOD CLOTS    Carotid atherosclerosis    Chronic anemia    Dermatophytosis of nail    Dizziness    End stage renal disease (HCC)    Esophageal reflux    Hearing impairment    HIGH BLOOD PRESSURE    HIGH CHOLESTEROL    Muscle weakness    Osteoarthrosis, unspecified whether generalized or localized, unspecified site    Other screening mammogram    Postmenopausal bleeding    Special screening for osteoporosis    Uterine fibroid    Venous insufficiency    Visual impairment    glasses   [3]   Social History  Socioeconomic History    Marital status:    Tobacco Use    Smoking status: Never    Smokeless tobacco: Never   Vaping Use    Vaping status: Never Used   Substance and Sexual Activity    Alcohol use: No    Drug use: No   Other Topics Concern    Caffeine Concern Yes     Comment: OCC    Exercise Yes     Comment: walks occ, PT exercises at  home     Social Drivers of Health      Received from [facility]    [facility]

## 2025-07-10 ENCOUNTER — HOSPITAL ENCOUNTER (OUTPATIENT)
Dept: LAB | Facility: HOSPITAL | Age: OVER 89
Discharge: HOME | End: 2025-07-10
Attending: INTERNAL MEDICINE
Payer: MEDICARE

## 2025-07-10 DIAGNOSIS — I48.91 ATRIAL FIBRILLATION WITH RVR (HCC): ICD-10-CM

## 2025-07-10 LAB — INR BLDC: 2.8 (ref 0.9–1.1)

## 2025-07-10 PROCEDURE — 85610 PROTHROMBIN TIME: CPT | Performed by: INTERNAL MEDICINE

## 2025-07-14 ENCOUNTER — OFFICE VISIT (OUTPATIENT)
Dept: PODIATRY CLINIC | Facility: CLINIC | Age: OVER 89
End: 2025-07-14

## 2025-07-14 DIAGNOSIS — L60.0 INGROWING TOENAIL: ICD-10-CM

## 2025-07-14 DIAGNOSIS — M20.42 HAMMER TOES OF BOTH FEET: ICD-10-CM

## 2025-07-14 DIAGNOSIS — M79.675 PAIN IN TOES OF BOTH FEET: ICD-10-CM

## 2025-07-14 DIAGNOSIS — M20.41 HAMMER TOES OF BOTH FEET: ICD-10-CM

## 2025-07-14 DIAGNOSIS — M79.674 PAIN IN TOES OF BOTH FEET: ICD-10-CM

## 2025-07-14 DIAGNOSIS — B35.1 ONYCHOMYCOSIS: ICD-10-CM

## 2025-07-14 DIAGNOSIS — N18.30 STAGE 3 CHRONIC KIDNEY DISEASE, UNSPECIFIED WHETHER STAGE 3A OR 3B CKD (HCC): Primary | ICD-10-CM

## 2025-07-14 DIAGNOSIS — L84 HELOMA DURUM: ICD-10-CM

## 2025-07-14 PROCEDURE — 99213 OFFICE O/P EST LOW 20 MIN: CPT | Performed by: PODIATRIST

## 2025-07-14 NOTE — PROGRESS NOTES
Guerita Birmingham is a 89 year old female.   Chief Complaint   Patient presents with    Toenail Care     Nail care and foot check- pcp lov 12/08/23         HPI:   Patient returns to clinic recently at her 89st birthday.  She comes in complaining of painful hammertoes and corns on them she has a known history of chronic kidney disease..  Also her toenails are thickened she cannot provide self-care.  At today's visit reviewed nurse's history as taken above, allergies medications and medical history as documented below.  All changes duly noted  Allergies: Celecoxib   Current Outpatient Medications   Medication Sig Dispense Refill    cholecalciferol 125 MCG (5000 UT) Oral Tab Vitamin D3 125 mcg (5,000 unit) tablet, [RxNorm: 550255]      methylPREDNISolone 4 MG Oral Tablet Therapy Pack use as directed per packet instructions for 6 days      rosuvastatin 10 MG Oral Tab TAKE ONE TABLET BY MOUTH ONE TIME DAILY Oral for Not Available      traMADol 50 MG Oral Tab TAKE ONE TABLET EVERY 8 HOURS AS NEEDED FOR BACK PAIN.      fluticasone propionate 50 MCG/ACT Nasal Suspension 1 spray by Nasal route daily. 1 each 0    Flecainide Acetate 100 MG Oral Tab Take 1 tablet (100 mg total) by mouth every 12 (twelve) hours. 60 tablet 5    Warfarin Sodium 5 MG Oral Tab Take 1 tablet (5 mg total) by mouth daily. 5 mg six day per week  7.5 mg 1 x a week  on thursday      Losartan Potassium 25 MG Oral Tab Take 1 tablet (25 mg total) by mouth daily.      Acetaminophen (TYLENOL ARTHRITIS EXT RELIEF OR) Take 2 tablets by mouth as needed.      Vitamin B-12 500 MCG Oral Tab Take 0.5 tablets (250 mcg total) by mouth. 3X week      ferrous sulfate 325 (65 FE) MG Oral Tab EC Take 1 tablet (325 mg total) by mouth. 3 times a week      Potassium Chloride Barbra CR 20 MEQ Oral Tab CR Take 1 tablet (20 mEq total) by mouth daily.      Calcium Carbonate-Vitamin D (CALCIUM 600 + D OR) Take 1 tablet by mouth. 3X week      Torsemide (DEMADEX) 20 MG Oral Tab  Take 1 tablet (20 mg total) by mouth Every other day PRN. I tab on odd and 2 on even days        Past Medical History:    Anal spasm    Arrhythmia    atrial fibrillation    BLOOD CLOTS    Carotid atherosclerosis    Chronic anemia    Dermatophytosis of nail    Dizziness    End stage renal disease (HCC)    Esophageal reflux    Hearing impairment    HIGH BLOOD PRESSURE    HIGH CHOLESTEROL    Muscle weakness    Osteoarthrosis, unspecified whether generalized or localized, unspecified site    Other screening mammogram    Postmenopausal bleeding    Special screening for osteoporosis    Uterine fibroid    Venous insufficiency    Visual impairment    glasses      Past Surgical History:   Procedure Laterality Date    Cardioversion  11/2017    Colonoscopy      Hip replacement surgery      Lig div&stripping short saphenous vein  1979    Ligation of hemorrhoid(s)      Mastoid obliteration      Other surgical history      Mastoidectomy    Other surgical history      Venous Ligation with Stripping    Other surgical history      Venous Sclerosing By Injection    Tonsillectomy      Total hip replacement Bilateral     12/2013 and 2018    Vein clinic - dm        Family History   Problem Relation Age of Onset    Cancer Mother         ovarian    Other (congestive heart failure) Father     Cancer Maternal Grandmother         colon    Cancer Maternal Grandfather         colon    Breast Cancer Maternal Aunt 65    Breast Cancer Paternal Aunt 65    Cancer Maternal Uncle         prostate    Cancer Maternal Uncle         eye    Cancer Maternal Uncle         lung      Social History     Socioeconomic History    Marital status:    Tobacco Use    Smoking status: Never    Smokeless tobacco: Never   Vaping Use    Vaping status: Never Used   Substance and Sexual Activity    Alcohol use: No    Drug use: No   Other Topics Concern    Caffeine Concern Yes     Comment: OCC    Exercise Yes     Comment: walks occ, PT exercises at  home            REVIEW OF SYSTEMS:   Today reviewed systens as documented below  GENERAL HEALTH: feels well otherwise  SKIN: Refer to exam below  RESPIRATORY: denies shortness of breath with exertion  CARDIOVASCULAR: denies chest pain on exertion  GI: denies abdominal pain and denies heartburn  NEURO: denies headaches    EXAM:   There were no vitals taken for this visit.  GENERAL: well developed, well nourished, in no apparent distress  EXTREMITIES:   1. Integument: The skin on her feet was evaluated its warm and dry there are multiple telangiectasias on both feet due to venous stasis and varicosities.  They are painful nucleated keratomas at the tip of the third toe left and tip of the fourth toe right secondary to adjacent hammertoe deformities.  There are no sores ulcerations or other problems noted at this visit.  The fourth toe she has developed a new corn over the DIP joint area secondary to a rigid hammertoe on the right foot.  She has a similar lesion on the third toe of the left foot but it is not symptomatic or painful.   2. Vascular: Patient has weakly palpable pulses dorsalis pedis and posterior tibial bilateral   3. Neurologic: The patient has intact sensorium   4. Musculoskeletal: The patient has hammertoes digits 2-5 bilateral feet was distal tip keratomas 3 left and 4 right.  They do not reduce on forefoot loading.    ASSESSMENT AND PLAN:   Diagnoses and all orders for this visit:    Stage 3 chronic kidney disease, unspecified whether stage 3a or 3b CKD (HCC)    Hammer toes of both feet    Heloma durum    Pain in toes of both feet    Ingrowing toenail    Onychomycosis        Plan: Today's visit trimmed toenails 1-5 manually can be started on healthy tissue as possible using a 15 blade I trimmed and debrided the hyperkeratoses at the distal tip of the toes that were affected the third on the left and the fourth on the right alleviating the patient's symptoms.  Again discussed with her proper fitting shoe gear and  advised her to follow-up every 2 to 3 months for care but sooner if required.  After trimming down all hyperkeratoses gave her silicone pads to wear on the fourth toe we will see how they work for her.  Advised follow-up again in 2 to 3 months for care but sooner if required..  The patient does not want him to use the crest pads she can continue to use her cotton padding as tolerated.  She said it works better for her.  The patient indicates understanding of these issues and agrees to the plan.    Girma Dc DPM

## 2025-07-15 ENCOUNTER — LAB ENCOUNTER (OUTPATIENT)
Dept: LAB | Age: OVER 89
End: 2025-07-15
Attending: FAMILY MEDICINE
Payer: MEDICARE

## 2025-07-15 DIAGNOSIS — Z79.01 LONG TERM (CURRENT) USE OF ANTICOAGULANTS: ICD-10-CM

## 2025-07-15 DIAGNOSIS — I48.91 ATRIAL FIBRILLATION (HCC): Primary | ICD-10-CM

## 2025-07-15 DIAGNOSIS — Z79.899 ENCOUNTER FOR LONG-TERM (CURRENT) DRUG USE: ICD-10-CM

## 2025-07-15 DIAGNOSIS — N18.31 CHRONIC KIDNEY DISEASE (CKD) STAGE G3A/A1, MODERATELY DECREASED GLOMERULAR FILTRATION RATE (GFR) BETWEEN 45-59 ML/MIN/1.73 SQUARE METER AND ALBUMINURIA CREATININE RATIO LESS THAN 30 MG/G (HCC): ICD-10-CM

## 2025-07-15 DIAGNOSIS — D69.6 THROMBOCYTOPENIA, UNSPECIFIED: ICD-10-CM

## 2025-07-15 DIAGNOSIS — I10 ESSENTIAL HYPERTENSION, MALIGNANT: ICD-10-CM

## 2025-07-15 DIAGNOSIS — E53.8 BIOTIN-(PROPIONYL-COA-CARBOXYLASE) LIGASE DEFICIENCY: ICD-10-CM

## 2025-07-15 DIAGNOSIS — Z51.81 ENCOUNTER FOR THERAPEUTIC DRUG MONITORING: ICD-10-CM

## 2025-07-15 DIAGNOSIS — E55.9 AVITAMINOSIS D: ICD-10-CM

## 2025-07-15 LAB
ALBUMIN SERPL-MCNC: 4.8 G/DL (ref 3.2–4.8)
ALBUMIN/GLOB SERPL: 2 {RATIO} (ref 1–2)
ALP LIVER SERPL-CCNC: 84 U/L (ref 55–142)
ALT SERPL-CCNC: 23 U/L (ref 10–49)
ANION GAP SERPL CALC-SCNC: 9 MMOL/L (ref 0–18)
AST SERPL-CCNC: 20 U/L (ref ?–34)
BASOPHILS # BLD AUTO: 0.01 X10(3) UL (ref 0–0.2)
BASOPHILS NFR BLD AUTO: 0.2 %
BILIRUB SERPL-MCNC: 1 MG/DL (ref 0.2–0.9)
BUN BLD-MCNC: 21 MG/DL (ref 9–23)
CALCIUM BLD-MCNC: 9.8 MG/DL (ref 8.7–10.6)
CHLORIDE SERPL-SCNC: 105 MMOL/L (ref 98–112)
CHOLEST SERPL-MCNC: 162 MG/DL (ref ?–200)
CO2 SERPL-SCNC: 30 MMOL/L (ref 21–32)
CREAT BLD-MCNC: 1.24 MG/DL (ref 0.55–1.02)
EGFRCR SERPLBLD CKD-EPI 2021: 41 ML/MIN/1.73M2 (ref 60–?)
EOSINOPHIL # BLD AUTO: 0.08 X10(3) UL (ref 0–0.7)
EOSINOPHIL NFR BLD AUTO: 2 %
ERYTHROCYTE [DISTWIDTH] IN BLOOD BY AUTOMATED COUNT: 13.6 %
EST. AVERAGE GLUCOSE BLD GHB EST-MCNC: 100 MG/DL (ref 68–126)
FASTING PATIENT LIPID ANSWER: YES
FASTING STATUS PATIENT QL REPORTED: YES
FOLATE SERPL-MCNC: 11.7 NG/ML (ref 5.4–?)
GLOBULIN PLAS-MCNC: 2.4 G/DL (ref 2–3.5)
GLUCOSE BLD-MCNC: 94 MG/DL (ref 70–99)
HBA1C MFR BLD: 5.1 % (ref ?–5.7)
HCT VFR BLD AUTO: 40 % (ref 35–48)
HDLC SERPL-MCNC: 66 MG/DL (ref 40–59)
HGB BLD-MCNC: 12.5 G/DL (ref 12–16)
IMM GRANULOCYTES # BLD AUTO: 0.02 X10(3) UL (ref 0–1)
IMM GRANULOCYTES NFR BLD: 0.5 %
LDLC SERPL CALC-MCNC: 80 MG/DL (ref ?–100)
LYMPHOCYTES # BLD AUTO: 1.13 X10(3) UL (ref 1–4)
LYMPHOCYTES NFR BLD AUTO: 28 %
MCH RBC QN AUTO: 31.3 PG (ref 26–34)
MCHC RBC AUTO-ENTMCNC: 31.3 G/DL (ref 31–37)
MCV RBC AUTO: 100 FL (ref 80–100)
MONOCYTES # BLD AUTO: 0.33 X10(3) UL (ref 0.1–1)
MONOCYTES NFR BLD AUTO: 8.2 %
NEUTROPHILS # BLD AUTO: 2.47 X10 (3) UL (ref 1.5–7.7)
NEUTROPHILS # BLD AUTO: 2.47 X10(3) UL (ref 1.5–7.7)
NEUTROPHILS NFR BLD AUTO: 61.1 %
NONHDLC SERPL-MCNC: 96 MG/DL (ref ?–130)
OSMOLALITY SERPL CALC.SUM OF ELEC: 301 MOSM/KG (ref 275–295)
PLATELET # BLD AUTO: 154 10(3)UL (ref 150–450)
POTASSIUM SERPL-SCNC: 4.3 MMOL/L (ref 3.5–5.1)
PROT SERPL-MCNC: 7.2 G/DL (ref 5.7–8.2)
RBC # BLD AUTO: 4 X10(6)UL (ref 3.8–5.3)
SODIUM SERPL-SCNC: 144 MMOL/L (ref 136–145)
TRIGL SERPL-MCNC: 87 MG/DL (ref 30–149)
TSI SER-ACNC: 1.55 UIU/ML (ref 0.55–4.78)
VIT D+METAB SERPL-MCNC: 34.2 NG/ML (ref 30–100)
VLDLC SERPL CALC-MCNC: 14 MG/DL (ref 0–30)
WBC # BLD AUTO: 4 X10(3) UL (ref 4–11)

## 2025-07-15 PROCEDURE — 82746 ASSAY OF FOLIC ACID SERUM: CPT

## 2025-07-15 PROCEDURE — 80053 COMPREHEN METABOLIC PANEL: CPT

## 2025-07-15 PROCEDURE — 82306 VITAMIN D 25 HYDROXY: CPT

## 2025-07-15 PROCEDURE — 83036 HEMOGLOBIN GLYCOSYLATED A1C: CPT

## 2025-07-15 PROCEDURE — 36415 COLL VENOUS BLD VENIPUNCTURE: CPT

## 2025-07-15 PROCEDURE — 85025 COMPLETE CBC W/AUTO DIFF WBC: CPT

## 2025-07-15 PROCEDURE — 84443 ASSAY THYROID STIM HORMONE: CPT

## 2025-07-15 PROCEDURE — 80061 LIPID PANEL: CPT

## 2025-08-06 ENCOUNTER — HOSPITAL ENCOUNTER (OUTPATIENT)
Dept: LAB | Facility: HOSPITAL | Age: OVER 89
Discharge: HOME | End: 2025-08-06
Attending: INTERNAL MEDICINE

## 2025-08-06 DIAGNOSIS — I48.91 ATRIAL FIBRILLATION WITH RVR (HCC): ICD-10-CM

## 2025-08-06 LAB — INR BLDC: 3.9 (ref 0.9–1.1)

## 2025-08-06 PROCEDURE — 85610 PROTHROMBIN TIME: CPT | Performed by: INTERNAL MEDICINE

## 2025-08-14 ENCOUNTER — HOSPITAL ENCOUNTER (OUTPATIENT)
Dept: LAB | Facility: HOSPITAL | Age: OVER 89
Discharge: HOME | End: 2025-08-14
Attending: INTERNAL MEDICINE

## 2025-08-14 DIAGNOSIS — I48.91 ATRIAL FIBRILLATION WITH RVR (HCC): ICD-10-CM

## 2025-08-14 LAB — INR BLDC: 2.7 (ref 0.9–1.1)

## 2025-08-14 PROCEDURE — 85610 PROTHROMBIN TIME: CPT | Performed by: INTERNAL MEDICINE

## 2025-08-23 ENCOUNTER — APPOINTMENT (OUTPATIENT)
Dept: GENERAL RADIOLOGY | Age: OVER 89
End: 2025-08-23
Attending: NURSE PRACTITIONER

## 2025-08-23 ENCOUNTER — APPOINTMENT (OUTPATIENT)
Dept: CT IMAGING | Age: OVER 89
End: 2025-08-23
Attending: NURSE PRACTITIONER

## 2025-08-23 ENCOUNTER — HOSPITAL ENCOUNTER (EMERGENCY)
Age: OVER 89
Discharge: HOME | End: 2025-08-23
Attending: STUDENT IN AN ORGANIZED HEALTH CARE EDUCATION/TRAINING PROGRAM

## 2025-08-23 VITALS
TEMPERATURE: 98 F | SYSTOLIC BLOOD PRESSURE: 169 MMHG | HEART RATE: 63 BPM | DIASTOLIC BLOOD PRESSURE: 87 MMHG | OXYGEN SATURATION: 99 % | HEIGHT: 71 IN | RESPIRATION RATE: 18 BRPM | BODY MASS INDEX: 27.58 KG/M2 | WEIGHT: 197 LBS

## 2025-08-23 DIAGNOSIS — S62.115A CLOSED NONDISPLACED FRACTURE OF TRIQUETRUM OF LEFT WRIST, INITIAL ENCOUNTER: ICD-10-CM

## 2025-08-23 DIAGNOSIS — S09.90XA INJURY OF HEAD, INITIAL ENCOUNTER: Primary | ICD-10-CM

## 2025-08-23 DIAGNOSIS — S01.112A EYEBROW LACERATION, LEFT, INITIAL ENCOUNTER: ICD-10-CM

## 2025-08-23 LAB
INR BLD: 2.5 (ref 0.8–1.2)
PROTHROMBIN TIME: 26.6 SECONDS (ref 11.6–14.8)

## 2025-08-23 PROCEDURE — 29125 APPL SHORT ARM SPLINT STATIC: CPT

## 2025-08-23 PROCEDURE — 90471 IMMUNIZATION ADMIN: CPT

## 2025-08-23 PROCEDURE — 12011 RPR F/E/E/N/L/M 2.5 CM/<: CPT

## 2025-08-23 PROCEDURE — 36415 COLL VENOUS BLD VENIPUNCTURE: CPT

## 2025-08-23 PROCEDURE — 70450 CT HEAD/BRAIN W/O DYE: CPT | Performed by: NURSE PRACTITIONER

## 2025-08-23 PROCEDURE — 99284 EMERGENCY DEPT VISIT MOD MDM: CPT

## 2025-08-23 PROCEDURE — 85610 PROTHROMBIN TIME: CPT | Performed by: NURSE PRACTITIONER

## 2025-08-23 PROCEDURE — 73110 X-RAY EXAM OF WRIST: CPT | Performed by: NURSE PRACTITIONER

## 2025-08-23 RX ORDER — TRAMADOL HYDROCHLORIDE 50 MG/1
50 TABLET ORAL EVERY 6 HOURS PRN
Qty: 20 TABLET | Refills: 0 | Status: SHIPPED | OUTPATIENT
Start: 2025-08-23 | End: 2025-08-28

## 2025-08-28 ENCOUNTER — HOSPITAL ENCOUNTER (OUTPATIENT)
Dept: LAB | Facility: HOSPITAL | Age: OVER 89
Discharge: HOME | End: 2025-08-28
Attending: INTERNAL MEDICINE

## 2025-08-28 ENCOUNTER — HOSPITAL ENCOUNTER (EMERGENCY)
Age: OVER 89
Discharge: HOME | End: 2025-08-28
Attending: EMERGENCY MEDICINE

## 2025-08-28 VITALS
RESPIRATION RATE: 18 BRPM | DIASTOLIC BLOOD PRESSURE: 73 MMHG | TEMPERATURE: 98 F | HEART RATE: 67 BPM | SYSTOLIC BLOOD PRESSURE: 151 MMHG | OXYGEN SATURATION: 97 %

## 2025-08-28 DIAGNOSIS — Z48.02 ENCOUNTER FOR REMOVAL OF SUTURES: Primary | ICD-10-CM

## 2025-08-28 DIAGNOSIS — I48.91 ATRIAL FIBRILLATION WITH RVR (HCC): ICD-10-CM

## 2025-08-28 LAB — INR BLDC: 2.9 (ref 0.9–1.1)

## 2025-08-28 PROCEDURE — 85610 PROTHROMBIN TIME: CPT | Performed by: INTERNAL MEDICINE

## (undated) DEVICE — STERILE POLYISOPRENE POWDER-FREE SURGICAL GLOVES: Brand: PROTEXIS

## (undated) DEVICE — INFLOWHYSTER S&N

## (undated) DEVICE — OUTFLOW HYSTER S&N

## (undated) DEVICE — SIGMOIDOSCOPE LIGHTED BIOSEAL

## (undated) DEVICE — PAIN TRAY: Brand: MEDLINE INDUSTRIES, INC.

## (undated) DEVICE — TOTAL HIP CDS: Brand: MEDLINE INDUSTRIES, INC.

## (undated) DEVICE — SOLUTION  .9 3000ML

## (undated) DEVICE — PREMIUM WET SKIN PREP TRAY: Brand: MEDLINE INDUSTRIES, INC.

## (undated) DEVICE — DRAPE,U/SHT,SPLIT,FILM,60X84,STERILE: Brand: MEDLINE

## (undated) DEVICE — CHLORAPREP 26ML APPLICATOR

## (undated) DEVICE — SUTURE VICRYL 1 OS-6

## (undated) DEVICE — 2T11 #2 PDO 36 X 36: Brand: 2T11 #2 PDO 36 X 36

## (undated) DEVICE — SOL NACL IRRIG 0.9% 1000ML BTL

## (undated) DEVICE — MEDI-VAC NON-CONDUCTIVE SUCTION TUBING: Brand: CARDINAL HEALTH

## (undated) DEVICE — SUT VICRYL 3-0 SH J416H

## (undated) DEVICE — SOLUTION  .9 1000ML BTL

## (undated) DEVICE — SPECIMEN CONTAINER,POSITIVE SEAL INDICATOR, OR PACKAGED: Brand: PRECISION

## (undated) DEVICE — JELLY LUBRICANT SURGILUBE 2OZ

## (undated) DEVICE — SEAL TRUCLEAR  HYSTERSCOP

## (undated) DEVICE — PETROLATUM GAUZE STRIP,OVERWRAP: Brand: VASELINE

## (undated) DEVICE — GLOVE SURG SENSICARE SZ 7

## (undated) DEVICE — RECTAL CDS-LF: Brand: MEDLINE INDUSTRIES, INC.

## (undated) DEVICE — SLEEVE KENDALL SCD EXPRESS MED

## (undated) DEVICE — HOOD, PEEL-AWAY: Brand: FLYTE

## (undated) DEVICE — SPECIMEN SOCK - STANDARD: Brand: MEDI-VAC

## (undated) DEVICE — DRAPE SURG 18X24

## (undated) DEVICE — DRESSING AQUACEL AG 3.5X12

## (undated) DEVICE — BLADE ELECTRODE: Brand: EDGE

## (undated) DEVICE — GYN CDS: Brand: MEDLINE INDUSTRIES, INC.

## (undated) DEVICE — SPNG DRESS 8X4IN NLTX STRL 12

## (undated) DEVICE — PROCTO SWABS: Brand: DEROYAL

## (undated) DEVICE — BIPOLAR SEALER 23-112-1 AQM 6.0: Brand: AQUAMANTYS™

## (undated) DEVICE — MARKER SKIN 2 TIP

## (undated) DEVICE — DERMA+FLEX TISSUE ADHESIVE

## (undated) DEVICE — ABDOMINAL PAD: Brand: DERMACEA

## (undated) DEVICE — STERILE HOOK LOCK LATEX FREE ELASTIC BANDAGE 6INX5YD: Brand: HOOK LOCK™

## (undated) DEVICE — GOWN SURG AERO CHROME XXL

## (undated) DEVICE — Device: Brand: STABLECUT®

## (undated) DEVICE — NEEDLE SPINAL 22X3-1/2 BLK

## (undated) DEVICE — DECANTER BAG 9": Brand: MEDLINE INDUSTRIES, INC.

## (undated) DEVICE — SUTURE VICRYL 2-0 CP-1

## (undated) DEVICE — GLOVE SURG SENSICARE SZ 7-1/2

## (undated) DEVICE — 2000CC GUARDIAN II: Brand: GUARDIAN

## (undated) DEVICE — APEX HOLE ELIMINATOR - PS
Type: IMPLANTABLE DEVICE | Site: HIP
Brand: APEX

## (undated) DEVICE — MLPD DISPOSABLE PAD (6' ROLL) 3 ROLLS: Brand: SCHAERER MEDICAL USA

## (undated) DEVICE — PILLOW ABDUCTION HIP MED

## (undated) DEVICE — KENDALL SCD EXPRESS SLEEVES, KNEE LENGTH, MEDIUM: Brand: KENDALL SCD

## (undated) DEVICE — USE ITEM #176901

## (undated) DEVICE — BANDAID COVERLET 1X3

## (undated) DEVICE — REMOVER DURAPREP 3M

## (undated) DEVICE — INSTRUMENT FEE

## (undated) NOTE — LETTER
[facility] Department  Phone: [phone]  Right Fax: [phone]  [facility] 20 By:  Sang Chandler RN Date: 5/30/18    Patient Name: Reema Carbajal  Surgery Date: 6/13/2018    CSN: [id number]  Medical Record: E

## (undated) NOTE — LETTER
Patient Name: Jerry Nino  YOB: 1934          MRN number:  [id number]  Date:  10/2/2017  Referring Physician:  Dr. Aria Garduno     Discharge Physical Therapy Summary    Pt has attended 8 visits in Physical Therapy.      Subjective: Jose tiwari Abduction: R 3+/5; L 4-/5   Flexion: R 5/5; L 5/5  Extension: R 5/5; L 5/5    DF: R 5/5; L 5/5  PF: R 3/5; L 3/5  INV: R 5/5; L 5/5  EV: R 5/5; L 5/5  Great toe ext: R 5/5; L 5/5     Special tests:   5STS: 20 sec  TU sec, uses hands for push-off    Ba X___________________________________________________ Date____________________    Certification From: 14/6/3574  To:12/31/2017

## (undated) NOTE — LETTER
URGENT: SURGERY IS 2022  OUTSIDE TESTING RESULT REQUEST     IMPORTANT: FOR YOUR IMMEDIATE ATTENTION  Please FAX all test results listed below to: [phone]     Testing already done on or about: 2022     * * * * If testing is NOT complete, arrange with patient A.S.A.P. * * * *      Patient Name: Katy Richard  Surgery Date: 2022  CSN: [id number]  Medical Record: [id number]   : 1934 - A: 80 y      Sex: female  Surgeon(s):  Kristie Reza MD  Procedure: EXCISION SKIN CYST RIGHT GLUTEAL SKIN, ANAL EXAMINATION UNDER ANESTHESIA, POSSIBLE ANAL FISTULOTOMY  Anesthesia Type: General     Surgeon: Kristie Reza MD     The following Testing and Time Line are REQUIRED PER ANESTHESIA     EKG READ AND SIGNED WITHIN   90 days      Thank Halima Estrella,   Sent by: Demetrice Gayle RN

## (undated) NOTE — LETTER
[facility] Department  Phone: [phone]  OUTSIDE TESTING RESULT REQUEST      TO:  Shonna Do Today's Date: 5/17/18    FAX #: [phone]     IMPORTANT: FOR YOUR IMMEDIATE ATTENTION  Please FAX all test results listed below to

## (undated) NOTE — LETTER
17    Patient: Eliza Paz  : 1934 Visit date: 2017    Dear  Dr. Lupe Norman MD,    Thank you for referring Eliza Paz to my practice. Please find my assessment and plan below.       Assessment   Prolapsed internal hemorrhoids

## (undated) NOTE — LETTER
17    Patient: Chang Melgar  : 1934 Visit date: 2017    Dear  Dr. Dasha Cedillo MD,    Thank you for referring Brain Talia to my practice. Please find my assessment and plan below.                Assessment   Prolapsed internal h

## (undated) NOTE — LETTER
10/09/18    RE: Jaxon Cunha    : 1934    Dear Dr. Hallman Memos    Your patient is being scheduled for a pain management procedure at [facility].    Procedure:  Bilateral lumbar facet injection with local and Right scapular trigger point injec

## (undated) NOTE — LETTER
[facility] [address], [address]  Consent for Procedure/Sedation    Date: 11/21/2017    Time: 5775      1. I authorize the performance upon Javier Pellet the following:  cardioversion     2.  I authorize Dr. Ascencion Davey (and whomever ________________________________    ___________________    Witness: _________________________      Date: ___________________    Printed: 2017   2:40 PM  Patient Name: Yordan Bernal        : 1934       Medical Record #: [id number]

## (undated) NOTE — LETTER
12/15/17    Patient: David Holt  : 1934 Visit date: 2017    Dear  Dr. Gonsalo Lr MD,    Thank you for referring David Holt to my practice. Please find my assessment and plan below.                Assessment   Prolapsed internal h proctitis or Crohn's disease. There is no gross blood in the anal canal or on the examining finger. All prior rubber band sites appear to be well-healed. I recommend the patient undergo colonoscopy next year. It could be at her convenience.   She will

## (undated) NOTE — LETTER
Patient Name: Renetta Sweet  YOB: 1934          MRN number:  [id number]  Date:  9/8/2017  Referring Physician:  Dr. Jonatan Arias EVALUATION:    Referring Physician: Dr. Robson Dela Cruz  Diagnosis: Q84.910 Hip pain; pain in L hip; dysfunction. Findings are significant for restricted ROM of L hip in capsular pattern and hypomobility of L hip joint; 3-/5 weakness of R hip abduction; point tenderness of R PSIS and iliac crest and hypomobility of lower lumbar levels.  Pt demonstrates gai · Pt will have improved L hip AROM Flex to 100 deg and IR to 15 deg to be able to don/doff shoes and perform car transfers without difficulty (8 visits)  · Pt will improve hip ABD and ER strength to 4/5 to increase ease with standing and walking (8 visits) Certification From: 7/8/7952  To:12/5/2017

## (undated) NOTE — LETTER
[facility] Department  Phone: [phone]  OUTSIDE TESTING RESULT REQUEST                                                                  URGENT    TO:   Lee Werner Today's Date: 5/17/18    FAX #: [phone]     IMPORTANT: FO

## (undated) NOTE — IP AVS SNAPSHOT
Patient Demographics     Address  [address] [place] [id number] Phone  [phone] [facility])  [phone] (Mobile) *Preferred*      Emergency Contact(s)     Name Relation Home Work Mobile    Makenzie Danielson Daughter [phone]      [place] No smoking  ? Avoid smoking. It is known to cause breathing problems and can decrease the rate of healing. Incision care/Dressing changes  ? Wash hands before and after dressing changes.     FOR MEDIPORE/COVERLET DRESSINGS:  Change dressing daily using M ? Keep pain manageable; pain should not disrupt your sleep or activities like getting out of bed or walking. ? You may need pain medication regularly (every 4-6 hours) the first 2 weeks and then begin to decrease how often you are taking it.   ? Take pain ? Do not allow others or pets to touch your incision. ? Avoid people that have colds or the flu. ? Your surgeon may recommend that you take antibiotics before you undergo any dental or other invasive surgical procedures after your joint replacement.  Sushil ? Inability to walk or put weight on your surgical leg      Signs of blood clot  ? Pain, excessive tenderness, redness, or swelling in leg or calf (other than incision site).             Go directly to the ER or CALL 911 if  you:  ? become short of breath Specialties:  Family Medicine, IP Consult to Primary Care  Contact information:  [address]  [phone]             Schedule an appointment as soon as possible for a visit with Judd Brunner, MD.    Specialties:  MARSH Commonly known as:  MIRALAX  Next dose due:  Anytime today if needed  Notes to patient:  Milk of Magnesia given this am      Take 17 g by mouth daily as needed.    Veronica Alvarez MD         Potassium Chloride ER 20 MEQ Tbcr  Commonly known as:  K-DUR M20 [id number] metoprolol Tartrate (LOPRESSOR) tab 25 mg 06/15/18 0640 Given                    Recent Vital Signs    Flowsheet Row Most Recent Value   Vitals  134/51 Filed at 06/15/2018 1539   Pulse  68 Filed at 06/15/2018 1539   Resp  18 Filed at 06/15/2018 Chloride 109 101 - 111 mmol/L — [facility]   CO2 26.0 22.0 - 32.0 mmol/L [facility]            MAGNESIUM [[id number]] (Normal)  Resulted: 06/15/18 9511, Result status: Final result   Ordering provider:  Mindy Ibrahim MD  06/14/18 7016 Resulting lab: Hosp Day # 0 PCP Shanika Pearce MD     Date of Admission:  (Not on file)    History of Present Illness:  Marisa Johnson is a(n) 80year old female.   The patient has failed conservative treatment for left hip osteoarthritis and has significant limita No prescriptions prior to admission. Physical Exam:   General: Alert, orientated x3. Cooperative. No apparent distress. Vital Signs:  Height 5' 11\" (1.803 m), weight 225 lb (102.1 kg). HEENT: Exam is unremarkable.     Neck: No tenderness to palpitat Filed:  6/15/2018  1:46 PM Date of Service:  6/15/2018  1:38 PM Status:  Signed    :  Minda Lloyd PTA (Physical Therapy Assistant)       PHYSICAL THERAPY HIP TREATMENT NOTE - INPATIENT      Room Number: 362/362-A     Session: 3  Number of Vi L Lower Extremity: Weight Bearing as Tolerated    PAIN ASSESSMENT   Ratin  Location: Left hip  Management Techniques:  Activity promotion    BALANCE  Static Sitting: Good  Dynamic Sitting: Fair +  Static Standing: Poor +  Dynamic Standing: Poor + Ankle Pumps     20 reps   Quad Sets 20 reps   Glut Sets 20 reps   Hip Abd/Add 20 reps   Heel slides 10 reps   Saq 10 reps   Sitting Knee Extension 20 reps   Standing heel/toe raises 0 reps   Hamstring Curls 0 reps   Forward, back steps 0 reps   Short Squat    Patient verbalizes and/or demonstrates all precautions and safety concerns independently   Goal #6           Goal Comments: Goals established on 6/14/2018 - all goals ongoing as of 6/15/2018      Attribution Key    Attribution information is not availab 1979: LIG DIV&STRIPPING SHORT SAPHENOUS VEIN  No date: MASTOID OBLITERATION  No date: OTHER SURGICAL HISTORY      Comment: Mastoidectomy  No date: OTHER SURGICAL HISTORY      Comment: Venous Ligation with Stripping  No date: OTHER SURGICAL HISTORY      Com Pattern: L Decreased stance time  Stoop/Curb Assistance: Not tested  Comment : Above score is based on FIM definations    Skilled Therapy Provided: AM: Pt seen BID on POD#1 for exercises, patient education on hip precautions and mobility training.  Pt was e and this patient is demonstrating a 69% degree of impairment in mobility. Research supports that patients with this level of impairment may benefit from DOMINGO.              DISCHARGE RECOMMENDATIONS  PT Discharge Recommendations: Sub-acute rehabilitation    P Presenting Problem: S/p Noncemented Left MARY on 06/13/18    Problem List  Principal Problem:    Status post left hip replacement  Active Problems:    Primary osteoarthritis of left hip    Encounter for long-term (current) use of medications    HTN (hyperte Dynamic Sitting: Fair +  Static Standing: Poor +  Dynamic Standing: Poor +  ACTIVITY TOLERANCE  Room air    AM-PAC '6-Clicks' INPATIENT SHORT FORM - BASIC MOBILITY  How much difficulty does the patient currently have. ..  -   Turning over in bed (including Sitting Knee Extension 10 reps 15 reps   Standing heel/toe raises 10 reps 15 reps   Hamstring Curls 10 reps 15 reps   Forward, back steps 10 reps 15 reps   Short Squats 10 reps 15 reps   '    Comments:  Pt participated in group session, tolerance was good.    Patient verbalizes and/or demonstrates all precautions and safety concerns independently   Goal #6           Goal Comments: Goals established on 6/14/2018[BR.1]       Attribution Andrews    BR. 1 - Sarahi Carrillo, PTA on 6/14/2018 12:42 PM  BR. 2 - Noe Research supports that patients with this level of impairment may benefit from DOMINGO. DISCHARGE RECOMMENDATIONS  PT Discharge Recommendations: Sub-acute rehabilitation    PLAN  PT Treatment Plan: Bed mobility; Endurance; Energy conservation;Patient Chronic renal disease, stage III (HCC)    MRSA (methicillin resistant Staphylococcus aureus) carrier[AR.2]      Past Medical History[AR.1]   Past Medical History:   Diagnosis Date   • Anal spasm    • Arrhythmia 12/2017    atrial fibrillation   • BLOOD CL -   Turning over in bed (including adjusting bedclothes, sheets and blankets)?: A Little   -   Sitting down on and standing up from a chair with arms (e.g., wheelchair, bedside commode, etc.): A Little   -   Moving from lying on back to sitting on the side Patient End of Session: Up in chair;Needs met;Call light within reach;RN aware of session/findings; All patient questions and concerns addressed;SCDs in place; Ice applied; Discussed recommendations with /[AR.2]    ASSESSMENT     Pt s Goal Comments: Goals established on 6/14/2018 - all goals ongoing as of[AR.1] 6/14/2018[AR.2]      Attribution Andrews    AR. 1 Michelle Juan, PTA on 6/14/2018  2:58 PM  AR. 2 - Bulmaro Centeno, PTA on 6/14/2018  2:59 PM               Physical Therapy Note Static Standing: Poor +  Dynamic Standing: Poor +    ADDITIONAL TESTS        ACTIVITY TOLERANCE  O2 Saturation: 98%  Room air    AM-PAC '6-Clicks' INPATIENT SHORT FORM - BASIC MOBILITY  How much difficulty does the patient currently have. ..  -   Turning ov Neuromuscular re-educate  Transfer training    Patient End of Session: Up in chair;Needs met;Call light within reach;RN aware of session/findings; All patient questions and concerns addressed;SCDs in place; Ice applied; Discussed recommendations with case man Number of Visits to Meet Established Goals: 4      CURRENT GOALS  Goal #1  Patient is able to demonstrate supine - sit EOB @ level: supervision     Goal #2  Patient is able to demonstrate transfers Sit to/from Stand at assistance level: supervison     Goal History related to current admission: Patient admitted for elective total left hip arthroplasty on 6/13. PMH of OA, HTN, a-fib, and CKD stage 3. Please refer to more detailed history below.       Problem List  Principal Problem:    Status post left hip repl Ratin (0 at rest, 6 when performing functional mobility)  Location: L hip  Management Techniques: Activity promotion; Body mechanics;Breathing techniques;Relaxation;Repositioning     ACTIVITY TOLERANCE  O2 Saturation: 94%  Room air  No shortness of ade Patient required increased time to complete LB dressing this session. Patient able to perform sit<>stand transfer using RW with min A from therapist. Patient agreeable to transfer to chair at end of session.  Patient performed upper body grooming routine in (progressing 6/15, performed in sitting up in chair)  Patient will perform lower body dressing:  with min assist, with adaptive equipment PRN and while maintaining hip precautions (progressing 6/15)  Patient will perform toileting: with supervision     Fun • Anal spasm    • Arrhythmia 12/2017    atrial fibrillation   • BLOOD CLOTS    • Dermatophytosis of nail    • Dizziness and giddiness    • HBP (high blood pressure)    • High blood cholesterol    • HIGH BLOOD PRESSURE    • HIGH CHOLESTEROL    • Muscle weak PERCEPTION  Overall Perception Status:   WFL - within functional limits    SENSATION  Light touch:  intact    Communication: wfl    Behavioral/Emotional/Social: wfl    RANGE OF MOTION AND STRENGTH ASSESSMENT    Upper extremity ROM is within functional limi reporting she feels \"like she got a workout\" after session. Patient End of Session: Up in chair;Needs met;Call light within reach;RN aware of session/findings; All patient questions and concerns addressed;SCDs in place; Ice applied; Discussed recommendatio detailed assessments, several treatment options    Overall Complexity MODERATE     OT Discharge Recommendations: Sub-acute rehabilitation  OT Device Recommendations: Reacher;Sock aid;Long-handled shoehorn;Long-handled sponge    PLAN  OT Treatment Plan: Dorie 7/16/2018 3:30 PM Adrianne Pedro, PT [facility] - [facility]    7/18/2018 1:30 PM Adrianne Pedro, PT [facility]ESS[facility] - [facility]    7/20/2018 1:30 PM Phillip Tony, PTA [address]

## (undated) NOTE — LETTER
AUTHORIZATION FOR SURGICAL OPERATION OR OTHER PROCEDURE    1. I hereby authorize Dr. Greyson Martin, and [address] [place] [address] staff assigned to my case to perform the following operation and/or procedure at the [address] [place] [address]:    _______________________________________________________________________________________________    Cortisone Injection Right foot  _______________________________________________________________________________________________    2. My physician has explained the nature and purpose of the operation or other procedure, possible alternative methods of treatment, the risks involved, and the possibility of complication to me. I acknowledge that no guarantee has been made as to the result that may be obtained. 3.  I recognize that, during the course of this operation, or other procedure, unforseen conditions may necessitate additional or different procedure than those listed above. I, therefore, further authorize and request that the above named physician, his/her physician assistants or designees perform such procedures as are, in his/her professional opinion, necessary and desirable. 4.  Any tissue or organs removed in the operation or other procedure may be disposed of by and at the discretion of the [address] [place] [address] and [facility]. 5.  I understand that in the event of a medical emergency, I will be transported by local paramedics to [facility] or other hospital emergency department. 6.  I certify that I have read and fully understand the above consent to operation and/or other procedure. 7.  I acknowledge that my physician has explained sedation/analgesia administration to me including the risks and benefits. I consent to the administration of sedation/analgesia as may be necessary or desirable in the judgement of my physician.     Witness signature: ___________________________________________________ Date:  ______/______/_____ Time:  ________ A. M.  P.M. Patient Name:  ______________________________________________________  (please print)      Patient signature:  ___________________________________________________             Relationship to Patient:           []  Parent    Responsible person                          []  Spouse  In case of minor or                    [] Other  _____________   Incompetent name:  __________________________________________________                               (please print)      _____________      Responsible person  In case of minor or  Incompetent signature:  _______________________________________________    Statement of Physician  My signature below affirms that prior to the time of the procedure, I have explained to the patient and/or his/her guardian, the risks and benefits involved in the proposed treatment and any reasonable alternative to the proposed treatment. I have also explained the risks and benefits involved in the refusal of the proposed treatment and have answered the patient's questions.                         Date:  ______/______/_______  Provider                      Signature:  __________________________________________________________       Time:  ___________ A.M    P.M.

## (undated) NOTE — LETTER
10/08/17    Patient: Aleajndro Galvez  : 1934 Visit date: 10/5/2017    Dear  Dr. Ghassan Karimi MD,    Thank you for referring Alejandro Galvez to my practice. Please find my assessment and plan below.                 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 The patient does take a dose of 81 mg aspirin daily.     Clinical examination including anoscopy reveals her to have 1/4 basal tone, 1/4 maximum squeeze pre

## (undated) NOTE — ED AVS SNAPSHOT
Chang Melagr   MRN: [id number]    Department:  [facility] Emergency Department   Date of Visit:  11/24/2017           Disclosure     Insurance plans vary and the physician(s) referred by the ER may not be covered by your plan.  Please contact tell this physician (or your personal doctor if your instructions are to return to your personal doctor) about any new or lasting problems. The primary care or specialist physician will see patients referred from the [facility] Emergency Department.  Benji Veronica

## (undated) NOTE — LETTER
OUTSIDE TESTING RESULT REQUEST     IMPORTANT: FOR YOUR IMMEDIATE ATTENTION  Please FAX all test results listed below to: [phone]     Testing already done on or about: 2022    * * * * If testing is NOT complete, arrange with patient A.S.A.P. * * * *      Patient Name: Cami Carr  Surgery Date: 2022  CSN: [id number]  Medical Record: [id number]   : 1934 - A: 80 y      Sex: female  Surgeon(s):  Craig Allen MD  Procedure: EXCISION SKIN CYST RIGHT GLUTEAL SKIN, ANAL EXAMINATION UNDER ANESTHESIA, POSSIBLE ANAL FISTULOTOMY  Anesthesia Type: General     Surgeon: Craig Allen MD     The following Testing and Time Line are REQUIRED PER ANESTHESIA     EKG READ AND SIGNED WITHIN   90 days      Thank Wesley Hong,   Sent by: Nikky Cabrera RN

## (undated) NOTE — MR AVS SNAPSHOT
After Visit Summary   4/7/2017    Dimas Grayson    MRN: [id number]           Diagnoses this Visit     Normocytic normochromic anemia         Iron deficiency anemia           Allergies     No Known Allergies      Your Vital Signs Were     BP Pul

## (undated) NOTE — IP AVS SNAPSHOT
[address]            (For Outpatient Use Only) Initial Admit Date: 6/13/2018   Inpt/Obs Admit Date: Inpt: 6/13/18 / Obs: N/A   Discharge Date:    Hospital Acct:  [de-identified]   MRN: [de-identified]   CSN: [id number]        ENCOUNTER  Patient Subscriber Name:  Fany Roy :    Subscriber ID:  Pt Rel to Subscriber:    Hospital Account Financial Class: Medicare    Cristy 15, 2018